# Patient Record
Sex: FEMALE | Race: BLACK OR AFRICAN AMERICAN | NOT HISPANIC OR LATINO | URBAN - METROPOLITAN AREA
[De-identification: names, ages, dates, MRNs, and addresses within clinical notes are randomized per-mention and may not be internally consistent; named-entity substitution may affect disease eponyms.]

---

## 2019-04-28 ENCOUNTER — INPATIENT (INPATIENT)
Facility: HOSPITAL | Age: 69
LOS: 8 days | Discharge: HOME HEALTH | End: 2019-05-07
Attending: INTERNAL MEDICINE | Admitting: INTERNAL MEDICINE
Payer: MEDICARE

## 2019-04-28 VITALS
OXYGEN SATURATION: 100 % | DIASTOLIC BLOOD PRESSURE: 80 MMHG | TEMPERATURE: 98 F | HEIGHT: 65 IN | SYSTOLIC BLOOD PRESSURE: 154 MMHG | WEIGHT: 123.9 LBS | RESPIRATION RATE: 17 BRPM | HEART RATE: 91 BPM

## 2019-04-28 DIAGNOSIS — I10 ESSENTIAL (PRIMARY) HYPERTENSION: ICD-10-CM

## 2019-04-28 DIAGNOSIS — C55 MALIGNANT NEOPLASM OF UTERUS, PART UNSPECIFIED: ICD-10-CM

## 2019-04-28 DIAGNOSIS — N18.3 CHRONIC KIDNEY DISEASE, STAGE 3 (MODERATE): ICD-10-CM

## 2019-04-28 DIAGNOSIS — I82.422 ACUTE EMBOLISM AND THROMBOSIS OF LEFT ILIAC VEIN: ICD-10-CM

## 2019-04-28 DIAGNOSIS — Z29.9 ENCOUNTER FOR PROPHYLACTIC MEASURES, UNSPECIFIED: ICD-10-CM

## 2019-04-28 LAB
ALBUMIN SERPL ELPH-MCNC: 3 G/DL — LOW (ref 3.3–5)
ALP SERPL-CCNC: 120 U/L — SIGNIFICANT CHANGE UP (ref 40–120)
ALT FLD-CCNC: 23 U/L — SIGNIFICANT CHANGE UP (ref 12–78)
ANION GAP SERPL CALC-SCNC: 9 MMOL/L — SIGNIFICANT CHANGE UP (ref 5–17)
APPEARANCE UR: CLEAR — SIGNIFICANT CHANGE UP
APTT BLD: 27 SEC — LOW (ref 28.5–37)
AST SERPL-CCNC: 40 U/L — HIGH (ref 15–37)
BACTERIA # UR AUTO: ABNORMAL
BASOPHILS # BLD AUTO: 0.02 K/UL — SIGNIFICANT CHANGE UP (ref 0–0.2)
BASOPHILS NFR BLD AUTO: 0.2 % — SIGNIFICANT CHANGE UP (ref 0–2)
BILIRUB SERPL-MCNC: 0.2 MG/DL — SIGNIFICANT CHANGE UP (ref 0.2–1.2)
BILIRUB UR-MCNC: NEGATIVE — SIGNIFICANT CHANGE UP
BUN SERPL-MCNC: 42 MG/DL — HIGH (ref 7–23)
CALCIUM SERPL-MCNC: 9.5 MG/DL — SIGNIFICANT CHANGE UP (ref 8.5–10.1)
CHLORIDE SERPL-SCNC: 99 MMOL/L — SIGNIFICANT CHANGE UP (ref 96–108)
CO2 SERPL-SCNC: 25 MMOL/L — SIGNIFICANT CHANGE UP (ref 22–31)
COLOR SPEC: YELLOW — SIGNIFICANT CHANGE UP
CREAT SERPL-MCNC: 2.04 MG/DL — HIGH (ref 0.5–1.3)
DIFF PNL FLD: NEGATIVE — SIGNIFICANT CHANGE UP
EOSINOPHIL # BLD AUTO: 0.01 K/UL — SIGNIFICANT CHANGE UP (ref 0–0.5)
EOSINOPHIL NFR BLD AUTO: 0.1 % — SIGNIFICANT CHANGE UP (ref 0–6)
EPI CELLS # UR: SIGNIFICANT CHANGE UP
GLUCOSE SERPL-MCNC: 150 MG/DL — HIGH (ref 70–99)
GLUCOSE UR QL: NEGATIVE MG/DL — SIGNIFICANT CHANGE UP
HCT VFR BLD CALC: 29.1 % — LOW (ref 34.5–45)
HGB BLD-MCNC: 9.1 G/DL — LOW (ref 11.5–15.5)
HYALINE CASTS # UR AUTO: ABNORMAL /LPF
IMM GRANULOCYTES NFR BLD AUTO: 1 % — SIGNIFICANT CHANGE UP (ref 0–1.5)
INR BLD: 1.14 RATIO — SIGNIFICANT CHANGE UP (ref 0.88–1.16)
KETONES UR-MCNC: NEGATIVE — SIGNIFICANT CHANGE UP
LEUKOCYTE ESTERASE UR-ACNC: NEGATIVE — SIGNIFICANT CHANGE UP
LIDOCAIN IGE QN: 354 U/L — SIGNIFICANT CHANGE UP (ref 73–393)
LYMPHOCYTES # BLD AUTO: 1.11 K/UL — SIGNIFICANT CHANGE UP (ref 1–3.3)
LYMPHOCYTES # BLD AUTO: 9 % — LOW (ref 13–44)
MCHC RBC-ENTMCNC: 23.6 PG — LOW (ref 27–34)
MCHC RBC-ENTMCNC: 31.3 GM/DL — LOW (ref 32–36)
MCV RBC AUTO: 75.6 FL — LOW (ref 80–100)
MONOCYTES # BLD AUTO: 0.94 K/UL — HIGH (ref 0–0.9)
MONOCYTES NFR BLD AUTO: 7.6 % — SIGNIFICANT CHANGE UP (ref 2–14)
NEUTROPHILS # BLD AUTO: 10.13 K/UL — HIGH (ref 1.8–7.4)
NEUTROPHILS NFR BLD AUTO: 82.1 % — HIGH (ref 43–77)
NITRITE UR-MCNC: NEGATIVE — SIGNIFICANT CHANGE UP
NRBC # BLD: 0 /100 WBCS — SIGNIFICANT CHANGE UP (ref 0–0)
PH UR: 5 — SIGNIFICANT CHANGE UP (ref 5–8)
PLATELET # BLD AUTO: 262 K/UL — SIGNIFICANT CHANGE UP (ref 150–400)
POTASSIUM SERPL-MCNC: 5.4 MMOL/L — HIGH (ref 3.5–5.3)
POTASSIUM SERPL-SCNC: 5.4 MMOL/L — HIGH (ref 3.5–5.3)
PROT SERPL-MCNC: 8.1 GM/DL — SIGNIFICANT CHANGE UP (ref 6–8.3)
PROT UR-MCNC: 30 MG/DL
PROTHROM AB SERPL-ACNC: 12.8 SEC — SIGNIFICANT CHANGE UP (ref 10–12.9)
RBC # BLD: 3.85 M/UL — SIGNIFICANT CHANGE UP (ref 3.8–5.2)
RBC # FLD: 13.5 % — SIGNIFICANT CHANGE UP (ref 10.3–14.5)
RBC CASTS # UR COMP ASSIST: SIGNIFICANT CHANGE UP /HPF (ref 0–4)
SODIUM SERPL-SCNC: 133 MMOL/L — LOW (ref 135–145)
SP GR SPEC: 1.01 — SIGNIFICANT CHANGE UP (ref 1.01–1.02)
UROBILINOGEN FLD QL: NEGATIVE MG/DL — SIGNIFICANT CHANGE UP
WBC # BLD: 12.33 K/UL — HIGH (ref 3.8–10.5)
WBC # FLD AUTO: 12.33 K/UL — HIGH (ref 3.8–10.5)
WBC UR QL: SIGNIFICANT CHANGE UP

## 2019-04-28 PROCEDURE — 71045 X-RAY EXAM CHEST 1 VIEW: CPT | Mod: 26

## 2019-04-28 PROCEDURE — 99223 1ST HOSP IP/OBS HIGH 75: CPT | Mod: AI

## 2019-04-28 PROCEDURE — 99285 EMERGENCY DEPT VISIT HI MDM: CPT

## 2019-04-28 PROCEDURE — 93010 ELECTROCARDIOGRAM REPORT: CPT

## 2019-04-28 PROCEDURE — 93970 EXTREMITY STUDY: CPT | Mod: 26

## 2019-04-28 PROCEDURE — 74176 CT ABD & PELVIS W/O CONTRAST: CPT | Mod: 26

## 2019-04-28 RX ORDER — LOSARTAN POTASSIUM 100 MG/1
100 TABLET, FILM COATED ORAL DAILY
Qty: 0 | Refills: 0 | Status: DISCONTINUED | OUTPATIENT
Start: 2019-04-28 | End: 2019-05-07

## 2019-04-28 RX ORDER — HEPARIN SODIUM 5000 [USP'U]/ML
4500 INJECTION INTRAVENOUS; SUBCUTANEOUS ONCE
Qty: 0 | Refills: 0 | Status: COMPLETED | OUTPATIENT
Start: 2019-04-28 | End: 2019-04-28

## 2019-04-28 RX ORDER — HEPARIN SODIUM 5000 [USP'U]/ML
2000 INJECTION INTRAVENOUS; SUBCUTANEOUS EVERY 6 HOURS
Qty: 0 | Refills: 0 | Status: DISCONTINUED | OUTPATIENT
Start: 2019-04-28 | End: 2019-05-05

## 2019-04-28 RX ORDER — SODIUM CHLORIDE 9 MG/ML
3 INJECTION INTRAMUSCULAR; INTRAVENOUS; SUBCUTANEOUS ONCE
Qty: 0 | Refills: 0 | Status: COMPLETED | OUTPATIENT
Start: 2019-04-28 | End: 2019-04-28

## 2019-04-28 RX ORDER — HEPARIN SODIUM 5000 [USP'U]/ML
4500 INJECTION INTRAVENOUS; SUBCUTANEOUS EVERY 6 HOURS
Qty: 0 | Refills: 0 | Status: DISCONTINUED | OUTPATIENT
Start: 2019-04-28 | End: 2019-05-05

## 2019-04-28 RX ORDER — SODIUM CHLORIDE 9 MG/ML
1000 INJECTION INTRAMUSCULAR; INTRAVENOUS; SUBCUTANEOUS ONCE
Qty: 0 | Refills: 0 | Status: COMPLETED | OUTPATIENT
Start: 2019-04-28 | End: 2019-04-28

## 2019-04-28 RX ORDER — HEPARIN SODIUM 5000 [USP'U]/ML
INJECTION INTRAVENOUS; SUBCUTANEOUS
Qty: 25000 | Refills: 0 | Status: DISCONTINUED | OUTPATIENT
Start: 2019-04-28 | End: 2019-05-05

## 2019-04-28 RX ADMIN — HEPARIN SODIUM 4500 UNIT(S): 5000 INJECTION INTRAVENOUS; SUBCUTANEOUS at 20:40

## 2019-04-28 RX ADMIN — HEPARIN SODIUM 1100 UNIT(S)/HR: 5000 INJECTION INTRAVENOUS; SUBCUTANEOUS at 20:43

## 2019-04-28 RX ADMIN — SODIUM CHLORIDE 1000 MILLILITER(S): 9 INJECTION INTRAMUSCULAR; INTRAVENOUS; SUBCUTANEOUS at 20:41

## 2019-04-28 RX ADMIN — SODIUM CHLORIDE 3 MILLILITER(S): 9 INJECTION INTRAMUSCULAR; INTRAVENOUS; SUBCUTANEOUS at 17:42

## 2019-04-28 NOTE — ED PROVIDER NOTE - PHYSICAL EXAMINATION
Gen: Alert, Well appearing. NAD    Head: NC, AT, PERRL, EOMI, normal lids/conjunctiva   ENT: Bilateral TM WNL, patent oropharynx without erythema/exudate, uvula midline  Neck: supple, no tenderness/meningismus  Pulm: Bilateral clear BS, normal resp effort, no wheeze/stridor/retractions  CV: RRR, no M/R/G, +dist pulses   Abd: soft, ++ multipl distended hard lower abd masses, ? fiboids, NT, +BS, no guarding/rebound tenderness  Mskel: +++ edema from groin down to left leg. no erythema, cyanosis, pulses intact.  Skin: no rash   Neuro: AAOx3, no sensory/motor deficits, CN 2-12 intact

## 2019-04-28 NOTE — H&P ADULT - NSHPLABSRESULTS_GEN_ALL_CORE
LABS:                        9.1    12.33 )-----------( 262      ( 2019 18:03 )             29.1     -    133<L>  |  99  |  42<H>  ----------------------------<  150<H>  5.4<H>   |  25  |  2.04<H>    Ca    9.5      2019 18:03    TPro  8.1  /  Alb  3.0<L>  /  TBili  0.2  /  DBili  x   /  AST  40<H>  /  ALT  23  /  AlkPhos  120  -    PT/INR - ( 2019 18:03 )   PT: 12.8 sec;   INR: 1.14 ratio         PTT - ( 2019 18:03 )  PTT:27.0 sec  Urinalysis Basic - ( 2019 20:04 )    Color: Yellow / Appearance: Clear / S.015 / pH: x  Gluc: x / Ketone: Negative  / Bili: Negative / Urobili: Negative mg/dL   Blood: x / Protein: 30 mg/dL / Nitrite: Negative   Leuk Esterase: Negative / RBC: 0-2 /HPF / WBC 3-5   Sq Epi: x / Non Sq Epi: Occasional / Bacteria: Few      CAPILLARY BLOOD GLUCOSE          RADIOLOGY & ADDITIONAL TESTS:    Imaging Personally Reviewed:  [ X] YES  [ ] NO

## 2019-04-28 NOTE — H&P ADULT - NSHPPHYSICALEXAM_GEN_ALL_CORE
Vital Signs Last 24 Hrs  T(C): 36.9 (28 Apr 2019 21:43), Max: 36.9 (28 Apr 2019 21:43)  T(F): 98.5 (28 Apr 2019 21:43), Max: 98.5 (28 Apr 2019 21:43)  HR: 90 (28 Apr 2019 21:43) (90 - 91)  BP: 129/84 (28 Apr 2019 21:43) (129/84 - 154/80)  BP(mean): --  RR: 18 (28 Apr 2019 21:43) (17 - 18)  SpO2: 100% (28 Apr 2019 21:43) (100% - 100%)    PHYSICAL EXAM:    GENERAL: NAD, well-groomed, well-developed  HEAD:  Atraumatic, Normocephalic  EYES: EOMI, PERRLA, conjunctiva and sclera clear  ENMT: No tonsillar erythema, exudates, or enlargement; Moist mucous membranes, No lesions  NECK: Supple, No JVD, Normal thyroid  NERVOUS SYSTEM:  Alert & Oriented X3, Good concentration; Motor Strength 5/5 B/L upper and lower extremities; DTRs 2+ intact and symmetric  CHEST/LUNG: Clear to percussion bilaterally; No rales, rhonchi, wheezing, or rubs  HEART: Regular rate and rhythm; 4/5 murmur, No rubs, or gallops, +S1,S2  ABDOMEN: Soft, +bs, Large firm pelvic and lower abdominal mass  EXTREMITIES:  2+ Peripheral Pulses, No clubbing, cyanosis, or edema on R, LLE 3+edema +cord, +warmth, +mild tenderness  LYMPH: No cervical adenopathy  RECTAL: deferred  BREAST: deferred  : deferred  SKIN: No rashes or lesions    IMPROVE VTE Individual Risk Assessment        RISK                                                          Points  [  x] Previous VTE                                                3  [  ] Thrombophilia                                             2  [  ] Lower limb paralysis                                   2        (unable to hold up >15 seconds)    [  ] Current Cancer                                             2         (within 6 months)  [ x ] Immobilization > 24 hrs                              1  [  ] ICU/CCU stay > 24 hours                            1  [ x ] Age > 60                                                    1  IMPROVE VTE Score ____5_____

## 2019-04-28 NOTE — ED ADULT TRIAGE NOTE - CHIEF COMPLAINT QUOTE
left leg swelling from hip to foot since friday, pain to left leg yesterday. pt able to move toes and foot, foot is warm and pulses present

## 2019-04-28 NOTE — H&P ADULT - HISTORY OF PRESENT ILLNESS
Pt. is a 70 y/o female w/pmhx of HTN,CKD and fibroids comes in w/lle swelling and pain.  Pt states she has been having abnormal vaginal bleeding, had seen gyn and had in office bx that wasnt revealing and [facility]  Pt had noticed swelling of LLE and told anesthesia who wanted LLE US done.  Pt doesnt know of results of US.  pt states she was nervous and anesthesia then wanted to get ct head for which pt was to be admitted, but she didnt want to and left.  she states she noticed more swelling of LLE next day and today she was also getting pain and more swelling which prompted her to come to ed.  pt doesnt re;port any recent travels or trauma.  she denies any fever, chills, sob, cp, palpitations, n/v/d/c no sick contacts.

## 2019-04-28 NOTE — ED ADULT NURSE NOTE - OBJECTIVE STATEMENT
69 y.o. female with a hx of htn complains of left leg swelling that started on friday. patient denies chest pain, shortness of breath, hormone therapy, and long therapy.

## 2019-04-28 NOTE — ED PROVIDER NOTE - OBJECTIVE STATEMENT
68yo female with pmh htn, fibroids presents with left leg edema x 2 days. denies cp, sob, palpitations, abd pain. Of note, pt with had post menopausal bleeding x few months, had biopsy with OBGYN and told it was "mucus".  Pt was supposed to get D&C 3 days ago, but anesthesiologist wanted sono. Sono was neg. Was also pending admission for further workup but pt did not want to stay and did not get D&C.     ROS: No fever/chills. No photophobia/eye pain/changes in vision, No ear pain/sore throat/dysphagia, No chest pain/palpitations. No SOB/cough/stridor. No abdominal pain, N/V/D, no black/bloody bm. No dysuria/frequency/discharge, No headache. No Dizziness.  No rash.  No numbness/tingling/weakness.

## 2019-04-28 NOTE — H&P ADULT - ASSESSMENT
68 y/o female w/pmhx of HTN,CKD and fibroids comes in w/acute extensive lle DVT and likely metastatic uterine ca

## 2019-04-28 NOTE — ED PROVIDER NOTE - CLINICAL SUMMARY MEDICAL DECISION MAKING FREE TEXT BOX
pt with extensive dvt of left iliac. CT performed as lkely source, probably endometrial cancer ans cxr appers to have metastatic lesions. dw dr sarkar for admission and heparin.

## 2019-04-29 LAB
APTT BLD: 89 SEC — HIGH (ref 28.5–37)
APTT BLD: 98.5 SEC — HIGH (ref 27.5–36.3)
CULTURE RESULTS: NO GROWTH — SIGNIFICANT CHANGE UP
HCT VFR BLD CALC: 24.8 % — LOW (ref 34.5–45)
HCT VFR BLD CALC: 28.5 % — LOW (ref 34.5–45)
HCV AB S/CO SERPL IA: 0.11 S/CO — SIGNIFICANT CHANGE UP (ref 0–0.99)
HCV AB SERPL-IMP: SIGNIFICANT CHANGE UP
HGB BLD-MCNC: 7.8 G/DL — LOW (ref 11.5–15.5)
HGB BLD-MCNC: 8.8 G/DL — LOW (ref 11.5–15.5)
MCHC RBC-ENTMCNC: 23.4 PG — LOW (ref 27–34)
MCHC RBC-ENTMCNC: 23.5 PG — LOW (ref 27–34)
MCHC RBC-ENTMCNC: 30.9 GM/DL — LOW (ref 32–36)
MCHC RBC-ENTMCNC: 31.5 GM/DL — LOW (ref 32–36)
MCV RBC AUTO: 74.7 FL — LOW (ref 80–100)
MCV RBC AUTO: 75.8 FL — LOW (ref 80–100)
NRBC # BLD: 0 /100 WBCS — SIGNIFICANT CHANGE UP (ref 0–0)
NRBC # BLD: 0 /100 WBCS — SIGNIFICANT CHANGE UP (ref 0–0)
PLATELET # BLD AUTO: 208 K/UL — SIGNIFICANT CHANGE UP (ref 150–400)
PLATELET # BLD AUTO: 282 K/UL — SIGNIFICANT CHANGE UP (ref 150–400)
RBC # BLD: 3.32 M/UL — LOW (ref 3.8–5.2)
RBC # BLD: 3.76 M/UL — LOW (ref 3.8–5.2)
RBC # FLD: 13.5 % — SIGNIFICANT CHANGE UP (ref 10.3–14.5)
RBC # FLD: 13.6 % — SIGNIFICANT CHANGE UP (ref 10.3–14.5)
SPECIMEN SOURCE: SIGNIFICANT CHANGE UP
WBC # BLD: 11.44 K/UL — HIGH (ref 3.8–10.5)
WBC # BLD: 14.73 K/UL — HIGH (ref 3.8–10.5)
WBC # FLD AUTO: 11.44 K/UL — HIGH (ref 3.8–10.5)
WBC # FLD AUTO: 14.73 K/UL — HIGH (ref 3.8–10.5)

## 2019-04-29 PROCEDURE — 99233 SBSQ HOSP IP/OBS HIGH 50: CPT

## 2019-04-29 RX ORDER — INFLUENZA VIRUS VACCINE 15; 15; 15; 15 UG/.5ML; UG/.5ML; UG/.5ML; UG/.5ML
0.5 SUSPENSION INTRAMUSCULAR ONCE
Qty: 0 | Refills: 0 | Status: COMPLETED | OUTPATIENT
Start: 2019-04-29 | End: 2019-04-29

## 2019-04-29 RX ADMIN — HEPARIN SODIUM 1100 UNIT(S)/HR: 5000 INJECTION INTRAVENOUS; SUBCUTANEOUS at 03:10

## 2019-04-29 RX ADMIN — LOSARTAN POTASSIUM 100 MILLIGRAM(S): 100 TABLET, FILM COATED ORAL at 06:04

## 2019-04-29 RX ADMIN — HEPARIN SODIUM 1100 UNIT(S)/HR: 5000 INJECTION INTRAVENOUS; SUBCUTANEOUS at 11:22

## 2019-04-29 NOTE — CONSULT NOTE ADULT - PROBLEM SELECTOR RECOMMENDATION 9
- Patient needs Gyn-Onc Evaluation, suggest Gyn Evaluation, Patient may have Sarcoma, or endometrial carcinoma   - Ca 125  - Anticoagulation with heparin for now  - Check Iron Studies, watch Blood counts

## 2019-04-29 NOTE — PROGRESS NOTE ADULT - SUBJECTIVE AND OBJECTIVE BOX
HPI:  Pt. is a 68 y/o female w/pmhx of HTN,CKD and fibroids comes in w/lle swelling and pain.  Pt states she has been having abnormal vaginal bleeding, had seen gyn and had in office bx that wasnt revealing and was scheduled for D/C at [facility] on friday.  Pt had noticed swelling of LLE and told anesthesia who wanted LLE US done.  Pt doesnt know of results of US.  pt states she was nervous and anesthesia then wanted to get ct head for which pt was to be admitted, but she didnt want to and left.  she states she noticed more swelling of LLE next day and today she was also getting pain and more swelling which prompted her to come to ed.  pt doesnt re;port any recent travels or trauma.  she denies any fever, chills, sob, cp, palpitations, n/v/d/c no sick contacts. (2019 23:13)    Patient is a 69y old  Female who presents with a chief complaint of LLE  Swelling (2019 10:19)      INTERVAL HPI/OVERNIGHT EVENTS: nO acute events overnigfht on heparin drip     MEDICATIONS  (STANDING):  heparin  Infusion.  Unit(s)/Hr (11 mL/Hr) IV Continuous <Continuous>  influenza   Vaccine 0.5 milliLiter(s) IntraMuscular once  losartan 100 milliGRAM(s) Oral daily    MEDICATIONS  (PRN):  heparin  Injectable 4500 Unit(s) IV Push every 6 hours PRN For aPTT less than 40  heparin  Injectable 2000 Unit(s) IV Push every 6 hours PRN For aPTT between 40 - 57      Allergies    No Known Allergies    Intolerances        REVIEW OF SYSTEMS:  CONSTITUTIONAL: No fever, weight loss, or fatigue  EYES: No eye pain, visual disturbances, or discharge  ENMT:  No difficulty hearing, tinnitus, vertigo; No sinus or throat pain  NECK: No pain or stiffness  BREASTS: No pain, masses, or nipple discharge  RESPIRATORY: No cough, wheezing, chills or hemoptysis; No shortness of breath  CARDIOVASCULAR: No chest pain, palpitations, dizziness, or leg swelling  GASTROINTESTINAL: No abdominal or epigastric pain. No nausea, vomiting, or hematemesis; No diarrhea or constipation. No melena or hematochezia.  GENITOURINARY: No dysuria, frequency, hematuria, or incontinence  NEUROLOGICAL: No headaches, memory loss, loss of strength, numbness, or tremors  SKIN: No itching, burning, rashes, or lesions   LYMPH NODES: No enlarged glands  ENDOCRINE: No heat or cold intolerance; No hair loss  MUSCULOSKELETAL: left leg swelling PSYCHIATRIC: No depression, anxiety, mood swings, or difficulty sleeping  HEME/LYMPH: No easy bruising, or bleeding gums  ALLERGY AND IMMUNOLOGIC: No hives or eczema    Vital Signs Last 24 Hrs  T(C): 37.2 (2019 12:00), Max: 37.7 (2019 00:45)  T(F): 99 (2019 12:00), Max: 99.9 (2019 05:37)  HR: 102 (2019 12:00) (81 - 102)  BP: 126/76 (2019 12:00) (125/72 - 154/80)  BP(mean): --  RR: 16 (2019 12:00) (16 - 18)  SpO2: 97% (2019 12:00) (97% - 100%)    PHYSICAL EXAM:  GENERAL: NAD, well-groomed, well-developed  HEAD:  Atraumatic, Normocephalic  EYES: EOMI, PERRLA, conjunctiva and sclera clear  ENMT: No tonsillar erythema, exudates, or enlargement; Moist mucous membranes, Good dentition, No lesions  NECK: Supple, No JVD, Normal thyroid  NERVOUS SYSTEM:  Alert & Oriented X3, Good concentration; Motor Strength 5/5 B/L upper and lower extremities; DTRs 2+ intact and symmetric Off affect   CHEST/LUNG: Clear to percussion bilaterally; No rales, rhonchi, wheezing, or rubs  HEART: Regular rate and rhythm; No murmurs, rubs, or gallops  ABDOMEN: Soft, Nontender, Nondistended; Bowel sounds present  EXTREMITIES:  left lower leg swelling LYMPH: No lymphadenopathy noted  SKIN: No rashes or lesions    LABS:                        8.8    14.73 )-----------( 282      ( 2019 08:44 )             28.5     04-28    133<L>  |  99  |  42<H>  ----------------------------<  150<H>  5.4<H>   |  25  |  2.04<H>    Ca    9.5      2019 18:03    TPro  8.1  /  Alb  3.0<L>  /  TBili  0.2  /  DBili  x   /  AST  40<H>  /  ALT  23  /  AlkPhos  120  -    PT/INR - ( 2019 18:03 )   PT: 12.8 sec;   INR: 1.14 ratio         PTT - ( 2019 10:22 )  PTT:98.5 sec  Urinalysis Basic - ( 2019 20:04 )    Color: Yellow / Appearance: Clear / S.015 / pH: x  Gluc: x / Ketone: Negative  / Bili: Negative / Urobili: Negative mg/dL   Blood: x / Protein: 30 mg/dL / Nitrite: Negative   Leuk Esterase: Negative / RBC: 0-2 /HPF / WBC 3-5   Sq Epi: x / Non Sq Epi: Occasional / Bacteria: Few        RADIOLOGY & ADDITIONAL TESTS:    Imaging Personally Reviewed:  [X ] YES  [ ] NO    Consultant(s) Notes Reviewed:  [X ] YES  [ ] NO    Care Discussed with Consultants/Other Providers [X ] YES  [ ] NO

## 2019-04-29 NOTE — PROGRESS NOTE ADULT - ASSESSMENT
68 y/o female w/pmhx of HTN,CKD and fibroids comes in w/acute extensive lle DVT and likely metastatic uterine ca    Tumor markers in am   MR pelvis no contrast secondary to creatinine  IVF       NEED to contact PMD and Oncologist

## 2019-04-30 DIAGNOSIS — K57.90 DIVERTICULOSIS OF INTESTINE, PART UNSPECIFIED, WITHOUT PERFORATION OR ABSCESS WITHOUT BLEEDING: ICD-10-CM

## 2019-04-30 DIAGNOSIS — R10.32 LEFT LOWER QUADRANT PAIN: ICD-10-CM

## 2019-04-30 LAB
ALBUMIN SERPL ELPH-MCNC: 2.2 G/DL — LOW (ref 3.3–5)
ALP SERPL-CCNC: 133 U/L — HIGH (ref 40–120)
ALT FLD-CCNC: 28 U/L — SIGNIFICANT CHANGE UP (ref 12–78)
ANION GAP SERPL CALC-SCNC: 8 MMOL/L — SIGNIFICANT CHANGE UP (ref 5–17)
APTT BLD: 105.5 SEC — HIGH (ref 27.5–36.3)
APTT BLD: 67.6 SEC — HIGH (ref 28.5–37)
APTT BLD: 72.4 SEC — HIGH (ref 28.5–37)
AST SERPL-CCNC: 51 U/L — HIGH (ref 15–37)
BILIRUB SERPL-MCNC: 0.4 MG/DL — SIGNIFICANT CHANGE UP (ref 0.2–1.2)
BUN SERPL-MCNC: 35 MG/DL — HIGH (ref 7–23)
CALCIUM SERPL-MCNC: 9.3 MG/DL — SIGNIFICANT CHANGE UP (ref 8.5–10.1)
CANCER AG125 SERPL-ACNC: 60 U/ML — HIGH
CHLORIDE SERPL-SCNC: 108 MMOL/L — SIGNIFICANT CHANGE UP (ref 96–108)
CO2 SERPL-SCNC: 25 MMOL/L — SIGNIFICANT CHANGE UP (ref 22–31)
CREAT SERPL-MCNC: 1.85 MG/DL — HIGH (ref 0.5–1.3)
FERRITIN SERPL-MCNC: 313 NG/ML — HIGH (ref 15–150)
FOLATE SERPL-MCNC: >20 NG/ML — SIGNIFICANT CHANGE UP
GLUCOSE SERPL-MCNC: 119 MG/DL — HIGH (ref 70–99)
HCT VFR BLD CALC: 24.6 % — LOW (ref 34.5–45)
HGB BLD-MCNC: 7.6 G/DL — LOW (ref 11.5–15.5)
IRON SATN MFR SERPL: 15 UG/DL — LOW (ref 30–160)
IRON SATN MFR SERPL: 8 % — LOW (ref 14–50)
LACTATE SERPL-SCNC: 1.3 MMOL/L — SIGNIFICANT CHANGE UP (ref 0.7–2)
MCHC RBC-ENTMCNC: 23.2 PG — LOW (ref 27–34)
MCHC RBC-ENTMCNC: 30.9 GM/DL — LOW (ref 32–36)
MCV RBC AUTO: 75.2 FL — LOW (ref 80–100)
NRBC # BLD: 0 /100 WBCS — SIGNIFICANT CHANGE UP (ref 0–0)
PLATELET # BLD AUTO: 269 K/UL — SIGNIFICANT CHANGE UP (ref 150–400)
POTASSIUM SERPL-MCNC: 4.8 MMOL/L — SIGNIFICANT CHANGE UP (ref 3.5–5.3)
POTASSIUM SERPL-SCNC: 4.8 MMOL/L — SIGNIFICANT CHANGE UP (ref 3.5–5.3)
PROT SERPL-MCNC: 6.5 GM/DL — SIGNIFICANT CHANGE UP (ref 6–8.3)
RBC # BLD: 3.27 M/UL — LOW (ref 3.8–5.2)
RBC # BLD: 3.27 M/UL — LOW (ref 3.8–5.2)
RBC # FLD: 13.8 % — SIGNIFICANT CHANGE UP (ref 10.3–14.5)
RETICS #: 47.4 K/UL — SIGNIFICANT CHANGE UP (ref 25–125)
RETICS/RBC NFR: 1.5 % — SIGNIFICANT CHANGE UP (ref 0.5–2.5)
SODIUM SERPL-SCNC: 141 MMOL/L — SIGNIFICANT CHANGE UP (ref 135–145)
TIBC SERPL-MCNC: 188 UG/DL — LOW (ref 220–430)
UIBC SERPL-MCNC: 173 UG/DL — SIGNIFICANT CHANGE UP (ref 110–370)
VIT B12 SERPL-MCNC: 1110 PG/ML — SIGNIFICANT CHANGE UP (ref 232–1245)
WBC # BLD: 13.88 K/UL — HIGH (ref 3.8–10.5)
WBC # FLD AUTO: 13.88 K/UL — HIGH (ref 3.8–10.5)

## 2019-04-30 PROCEDURE — 76856 US EXAM PELVIC COMPLETE: CPT | Mod: 26

## 2019-04-30 PROCEDURE — 72195 MRI PELVIS W/O DYE: CPT | Mod: 26

## 2019-04-30 PROCEDURE — 99233 SBSQ HOSP IP/OBS HIGH 50: CPT

## 2019-04-30 RX ORDER — ACETAMINOPHEN 500 MG
650 TABLET ORAL EVERY 6 HOURS
Qty: 0 | Refills: 0 | Status: DISCONTINUED | OUTPATIENT
Start: 2019-04-30 | End: 2019-05-07

## 2019-04-30 RX ADMIN — HEPARIN SODIUM 1000 UNIT(S)/HR: 5000 INJECTION INTRAVENOUS; SUBCUTANEOUS at 06:19

## 2019-04-30 RX ADMIN — HEPARIN SODIUM 1000 UNIT(S)/HR: 5000 INJECTION INTRAVENOUS; SUBCUTANEOUS at 15:53

## 2019-04-30 RX ADMIN — Medication 650 MILLIGRAM(S): at 02:25

## 2019-04-30 RX ADMIN — HEPARIN SODIUM 1000 UNIT(S)/HR: 5000 INJECTION INTRAVENOUS; SUBCUTANEOUS at 22:36

## 2019-04-30 RX ADMIN — Medication 650 MILLIGRAM(S): at 01:55

## 2019-04-30 NOTE — PROGRESS NOTE ADULT - ASSESSMENT
PERIMENOPAUSAL FEMALE LLQ PAIN WITH  LEFT COMPLEX MASS QUESTIONABLE BLOOD FLOW.  PERIOD TWO WEEKS AGO  IRREGULAR,   WILL ADMIT PATIENT FOR  OBSERVATION , HYDRATION AND ANTIBIOTICS PE  + BS SOFTLY DISTENDED WITH A HARD TENDER MASS EQUIVALENT  WITH A  24 WEEK SIZE UTERUS THE MASS IS TENDER TO PALPATION AND IS MOST CONSISTENT WITH A DEGENERATING FIBROID HOWEVER MALIGANCY CAN NOT BE RULE OUT UNTIL A SPECIMEN IS OBTAINED   VAGINA SCANT BLOOD WITH NO LESION ATROPHIC  NO VAGINAL MASS  CERVIX IS ELEVATED TO AN APES NOT REACHABLE ON EXAM THE MASS OCCUPIES MOST OF THE PELVIS

## 2019-04-30 NOTE — PROGRESS NOTE ADULT - PROBLEM SELECTOR PLAN 1
ADMIT FOR HYDRATION AND ANTIBIOTICS  PERCOCET AND MOTRIN FOR PAIN   CLEAR LIQUIDS AND NPO AFTER MIDNIGHT   REEVALUATE IN AM ELEVATION WARM COMPRESS ANTICOAGULATION

## 2019-04-30 NOTE — PROGRESS NOTE ADULT - SUBJECTIVE AND OBJECTIVE BOX
HPI:  Pt. is a 70 y/o female w/pmhx of HTN,CKD and fibroids comes in w/lle swelling and pain.  Pt states she has been having abnormal vaginal bleeding, had seen gyn and had in office bx that wasnt revealing and was scheduled for D/C at [facility] on friday.  Pt had noticed swelling of LLE and told anesthesia who wanted LLE US done.  Pt doesnt know of results of US.  pt states she was nervous and anesthesia then wanted to get ct head for which pt was to be admitted, but she didnt want to and left.  she states she noticed more swelling of LLE next day and today she was also getting pain and more swelling which prompted her to come to ed.  pt doesnt re;port any recent travels or trauma.  she denies any fever, chills, sob, cp, palpitations, n/v/d/c no sick contacts. (2019 23:13)      Patient is a 69y old  Female who presents with a chief complaint of LLE  Swelling (2019 15:52)      INTERVAL HPI/OVERNIGHT EVENTS: no acute events overnight reports some bleeding     MEDICATIONS  (STANDING):  heparin  Infusion.  Unit(s)/Hr (11 mL/Hr) IV Continuous <Continuous>  influenza   Vaccine 0.5 milliLiter(s) IntraMuscular once  losartan 100 milliGRAM(s) Oral daily    MEDICATIONS  (PRN):  acetaminophen   Tablet .. 650 milliGRAM(s) Oral every 6 hours PRN Temp greater or equal to 38C (100.4F)  heparin  Injectable 4500 Unit(s) IV Push every 6 hours PRN For aPTT less than 40  heparin  Injectable 2000 Unit(s) IV Push every 6 hours PRN For aPTT between 40 - 57      Allergies    No Known Allergies    Intolerances        REVIEW OF SYSTEMS:  CONSTITUTIONAL: No fever, weight loss, or fatigue  EYES: No eye pain, visual disturbances, or discharge  ENMT:  No difficulty hearing, tinnitus, vertigo; No sinus or throat pain  NECK: No pain or stiffness  BREASTS: No pain, masses, or nipple discharge  RESPIRATORY: No cough, wheezing, chills or hemoptysis; No shortness of breath  CARDIOVASCULAR: No chest pain, palpitations, dizziness, or leg swelling  GASTROINTESTINAL: No abdominal or epigastric pain. No nausea, vomiting, or hematemesis; No diarrhea or constipation. No melena or hematochezia.  GENITOURINARY: No dysuria, frequency, hematuria, or incontinence  NEUROLOGICAL: No headaches, memory loss, loss of strength, numbness, or tremors  SKIN: No itching, burning, rashes, or lesions   LYMPH NODES: No enlarged glands  ENDOCRINE: No heat or cold intolerance; No hair loss  MUSCULOSKELETAL: left leg swelling improving   PSYCHIATRIC: No depression, anxiety, mood swings, or difficulty sleeping  HEME/LYMPH: No easy bruising, or bleeding gums  ALLERGY AND IMMUNOLOGIC: No hives or eczema    Vital Signs Last 24 Hrs  T(C): 37.1 (2019 11:45), Max: 38.3 (2019 00:12)  T(F): 98.7 (2019 11:45), Max: 101 (2019 00:12)  HR: 88 (2019 11:45) (77 - 88)  BP: 124/74 (2019 11:45) (102/63 - 124/74)  BP(mean): --  RR: 17 (2019 11:45) (16 - 17)  SpO2: 98% (2019 11:45) (97% - 100%)    PHYSICAL EXAM:  GENERAL: NAD, well-groomed, well-developed  HEAD:  Atraumatic, Normocephalic  EYES: EOMI, PERRLA, conjunctiva and sclera clear  ENMT: No tonsillar erythema, exudates, or enlargement; Moist mucous membranes, Good dentition, No lesions  NECK: Supple, No JVD, Normal thyroid  NERVOUS SYSTEM:  Alert & Oriented X3, Good concentration; Motor Strength 5/5 B/L upper and lower extremities; DTRs 2+ intact and symmetric  CHEST/LUNG: Clear to percussion bilaterally; No rales, rhonchi, wheezing, or rubs  HEART: Regular rate and rhythm; No murmurs, rubs, or gallops  ABDOMEN: Soft, Nontender, Nondistended; Bowel sounds present  EXTREMITIES:  left leg swelling LYMPH: No lymphadenopathy noted  SKIN: No rashes or lesions    LABS:                        7.6    13.88 )-----------( 269      ( 2019 06:19 )             24.6     04-30    141  |  108  |  35<H>  ----------------------------<  119<H>  4.8   |  25  |  1.85<H>    Ca    9.3      2019 06:19    TPro  6.5  /  Alb  2.2<L>  /  TBili  0.4  /  DBili  x   /  AST  51<H>  /  ALT  28  /  AlkPhos  133<H>  04-    PTT - ( 2019 15:31 )  PTT:72.4 sec  Urinalysis Basic - ( 2019 20:04 )    Color: Yellow / Appearance: Clear / S.015 / pH: x  Gluc: x / Ketone: Negative  / Bili: Negative / Urobili: Negative mg/dL   Blood: x / Protein: 30 mg/dL / Nitrite: Negative   Leuk Esterase: Negative / RBC: 0-2 /HPF / WBC 3-5   Sq Epi: x / Non Sq Epi: Occasional / Bacteria: Few      CAPILLARY BLOOD GLUCOSE          RADIOLOGY & ADDITIONAL TESTS:  < from: MR Pelvis No Cont (19 @ 16:37) >  IMPRESSION:    The study islimited due to motion and lack of intravenous contrast.    Markedly enlarged heterogeneous uterus with multiple masses which may   represent fibroids versus leiomyosarcoma.     Additional findings as above.      < end of copied text >  < from: US Pelvis Complete (19 @ 10:40) >  IMPRESSION:    Markedly enlarged, heterogeneous uterus concerning for uterine neoplasm.   Endometrium is distorted and not well visualized.    There is debris versus nodularity along the right urinary bladder wall.   Consider further evaluation with contrast-enhanced CT or cystoscopy to   evaluate for metastatic disease.      < end of copied text >    Imaging Personally Reviewed:  [ X] YES  [ ] NO    Consultant(s) Notes Reviewed:  [X ] YES  [ ] NO    Care Discussed with Consultants/Other Providers [ X] YES  [ ] NO

## 2019-04-30 NOTE — PROGRESS NOTE ADULT - SUBJECTIVE AND OBJECTIVE BOX
54 YEAR OLD BLACK FEMALE  PRESENTS WITH SUDDEN ONSET OF LLQ PAIN SEVERE ENOUGH TO CALL EMS  WHICH AWAKE HER FROM SLEEP  ACCOMPANIED BY NAUSEA AND VOMITING   +PMH SIMILAR EPISODE OF LLQ CLAUDE SEVERAL YEARS AGO WHICH RESOLVED OVER THE COURSE OF THREE DAYS WITH OUT INTERVENTION   +PSH CATARACT SURGERY RIGHT EYE AND 2 TOP/D&C  +POBH  24 YEARS AGO UNCOMPLICATED   NKDA NO TOBACCO DRUGS SOCIAL ETOH   INFREQUENT INTERCOURSE HAS BEEN WITH PAIN   LMP IRREGULAR APPROXIMATELY 15 DAYS AGO     +BS SOFTLY DISTENDED WITH BOWEL SOUNDS  VULVAR WITH OUT LESIONS SCANT BLOOD  NO MASS AND OR TENDERNESS ON BIMANUAL EXAM  OS FREELY MOBILE WITH TO ADNEXAL MASS OF TENDERNESS 54 YEAR OLD WEST  FEMALE PRESENTS WITH LLE SWELLING KIMI THE ER. THE PATIENT WAS ADMITTED AND ONDE ADMITTED UNDER WENT SEVERAL IMAGING PROCEDURES WHICH REVEAL A LARGE ABDOMEN MASS WHICH IS EVIDENT ON PE  THE PATIENT ADMITS TO HAVE BEEN EXPERIENCING POSTMENOPAUSAL BLEEDING SINCE 02/19

## 2019-04-30 NOTE — PROGRESS NOTE ADULT - ASSESSMENT
70 y/o female w/pmhx of HTN,CKD and fibroids comes in w/acute extensive lle DVT and likely metastatic uterine ca       will have GYN consult in AM     messages left for PMD Dr. Nilson Parisi [phone]    Possible outside OBGYN [phone]

## 2019-04-30 NOTE — PROGRESS NOTE ADULT - PROBLEM SELECTOR PLAN 2
BLAND DIET  CLEAR LIQUIDS NPO AFTER MIDNIGHT  STOOL SOFTENER MORE THAN LIKELY RELATED TO A COMBINATION OF DEGENERATING FIBROID POSSIBLE POLYP AND ANTICOAGULATION  ONE TREATMENT FOR LLE IS COMPLETE WOULD CHOICE TO SAMPLE ENDOMETRIUM AND MORE THAN LIKELY PREFORM PRATEEK/BSO

## 2019-05-01 DIAGNOSIS — N93.8 OTHER SPECIFIED ABNORMAL UTERINE AND VAGINAL BLEEDING: ICD-10-CM

## 2019-05-01 DIAGNOSIS — I82.492 ACUTE EMBOLISM AND THROMBOSIS OF OTHER SPECIFIED DEEP VEIN OF LEFT LOWER EXTREMITY: ICD-10-CM

## 2019-05-01 LAB
ALBUMIN SERPL ELPH-MCNC: 2.3 G/DL — LOW (ref 3.3–5)
ALP SERPL-CCNC: 171 U/L — HIGH (ref 40–120)
ALT FLD-CCNC: 36 U/L — SIGNIFICANT CHANGE UP (ref 12–78)
ANION GAP SERPL CALC-SCNC: 9 MMOL/L — SIGNIFICANT CHANGE UP (ref 5–17)
APTT BLD: 78 SEC — HIGH (ref 28.5–37)
AST SERPL-CCNC: 54 U/L — HIGH (ref 15–37)
BILIRUB SERPL-MCNC: 0.4 MG/DL — SIGNIFICANT CHANGE UP (ref 0.2–1.2)
BUN SERPL-MCNC: 33 MG/DL — HIGH (ref 7–23)
CALCIUM SERPL-MCNC: 9.5 MG/DL — SIGNIFICANT CHANGE UP (ref 8.5–10.1)
CHLORIDE SERPL-SCNC: 104 MMOL/L — SIGNIFICANT CHANGE UP (ref 96–108)
CO2 SERPL-SCNC: 24 MMOL/L — SIGNIFICANT CHANGE UP (ref 22–31)
CREAT SERPL-MCNC: 1.95 MG/DL — HIGH (ref 0.5–1.3)
GLUCOSE SERPL-MCNC: 123 MG/DL — HIGH (ref 70–99)
HCT VFR BLD CALC: 25.4 % — LOW (ref 34.5–45)
HGB BLD-MCNC: 7.9 G/DL — LOW (ref 11.5–15.5)
MAGNESIUM SERPL-MCNC: 2.4 MG/DL — SIGNIFICANT CHANGE UP (ref 1.6–2.6)
MCHC RBC-ENTMCNC: 23.4 PG — LOW (ref 27–34)
MCHC RBC-ENTMCNC: 31.1 GM/DL — LOW (ref 32–36)
MCV RBC AUTO: 75.4 FL — LOW (ref 80–100)
NRBC # BLD: 0 /100 WBCS — SIGNIFICANT CHANGE UP (ref 0–0)
PHOSPHATE SERPL-MCNC: 3.4 MG/DL — SIGNIFICANT CHANGE UP (ref 2.5–4.5)
PLATELET # BLD AUTO: 326 K/UL — SIGNIFICANT CHANGE UP (ref 150–400)
POTASSIUM SERPL-MCNC: 4.7 MMOL/L — SIGNIFICANT CHANGE UP (ref 3.5–5.3)
POTASSIUM SERPL-SCNC: 4.7 MMOL/L — SIGNIFICANT CHANGE UP (ref 3.5–5.3)
PROT SERPL-MCNC: 7.1 GM/DL — SIGNIFICANT CHANGE UP (ref 6–8.3)
RBC # BLD: 3.37 M/UL — LOW (ref 3.8–5.2)
RBC # FLD: 13.9 % — SIGNIFICANT CHANGE UP (ref 10.3–14.5)
SODIUM SERPL-SCNC: 137 MMOL/L — SIGNIFICANT CHANGE UP (ref 135–145)
WBC # BLD: 12.93 K/UL — HIGH (ref 3.8–10.5)
WBC # FLD AUTO: 12.93 K/UL — HIGH (ref 3.8–10.5)

## 2019-05-01 PROCEDURE — 99233 SBSQ HOSP IP/OBS HIGH 50: CPT

## 2019-05-01 RX ADMIN — Medication 650 MILLIGRAM(S): at 16:18

## 2019-05-01 RX ADMIN — HEPARIN SODIUM 1000 UNIT(S)/HR: 5000 INJECTION INTRAVENOUS; SUBCUTANEOUS at 07:00

## 2019-05-01 RX ADMIN — Medication 650 MILLIGRAM(S): at 19:00

## 2019-05-01 NOTE — PROGRESS NOTE ADULT - SUBJECTIVE AND OBJECTIVE BOX
Lying in bed, feels well    Vital Signs Last 24 Hrs  T(C): 37.4 (01 May 2019 05:00), Max: 37.7 (30 Apr 2019 23:12)  T(F): 99.3 (01 May 2019 05:00), Max: 99.8 (30 Apr 2019 23:12)  HR: 81 (01 May 2019 05:00) (81 - 89)  BP: 104/65 (01 May 2019 05:00) (104/65 - 124/74)  BP(mean): --  RR: 17 (01 May 2019 05:00) (17 - 17)  SpO2: 100% (01 May 2019 05:00) (98% - 100%)    PHYSICAL EXAM:    general - AAO x 3  HEENT - No Icterus  CVS - RRR  RS - AE B/L  Abd - soft, NT  Ext - Pulses +        LABS:                        7.9    12.93 )-----------( 326      ( 01 May 2019 06:42 )             25.4     05-01    137  |  104  |  33<H>  ----------------------------<  123<H>  4.7   |  24  |  1.95<H>    Ca    9.5      01 May 2019 06:42  Phos  3.4     05-01  Mg     2.4     05-01    TPro  7.1  /  Alb  2.3<L>  /  TBili  0.4  /  DBili  x   /  AST  54<H>  /  ALT  36  /  AlkPhos  171<H>  05-01    PTT - ( 01 May 2019 06:42 )  PTT:78.0 sec      Culture - Blood (collected 30 Apr 2019 09:17)  Source: .Blood  Preliminary Report (01 May 2019 10:00):    No growth to date.    Culture - Blood (collected 30 Apr 2019 09:17)  Source: .Blood  Preliminary Report (01 May 2019 10:00):    No growth to date.    Culture - Urine (collected 29 Apr 2019 01:19)  Source: .Urine  Final Report (29 Apr 2019 21:22):    No growth        RADIOLOGY & ADDITIONAL STUDIES:

## 2019-05-01 NOTE — PROGRESS NOTE ADULT - SUBJECTIVE AND OBJECTIVE BOX
Patient is a 69y old  Female who presents with a chief complaint of LLE  Swelling (01 May 2019 10:20)      INTERVAL HPI/OVERNIGHT EVENTS: no acute events fever     MEDICATIONS  (STANDING):  heparin  Infusion.  Unit(s)/Hr (11 mL/Hr) IV Continuous <Continuous>  influenza   Vaccine 0.5 milliLiter(s) IntraMuscular once  losartan 100 milliGRAM(s) Oral daily    MEDICATIONS  (PRN):  acetaminophen   Tablet .. 650 milliGRAM(s) Oral every 6 hours PRN Temp greater or equal to 38C (100.4F)  heparin  Injectable 4500 Unit(s) IV Push every 6 hours PRN For aPTT less than 40  heparin  Injectable 2000 Unit(s) IV Push every 6 hours PRN For aPTT between 40 - 57      Allergies    No Known Allergies    Intolerances        REVIEW OF SYSTEMS:  CONSTITUTIONAL: fever   EYES: No eye pain, visual disturbances, or discharge  ENMT:  No difficulty hearing, tinnitus, vertigo; No sinus or throat pain  NECK: No pain or stiffness  BREASTS: No pain, masses, or nipple discharge  RESPIRATORY: No cough, wheezing, chills or hemoptysis; No shortness of breath  CARDIOVASCULAR: No chest pain, palpitations, dizziness, or leg swelling  GASTROINTESTINAL: No abdominal or epigastric pain. No nausea, vomiting, or hematemesis; No diarrhea or constipation. No melena or hematochezia.  GENITOURINARY: No dysuria, frequency, hematuria, or incontinence  NEUROLOGICAL: No headaches, memory loss, loss of strength, numbness, or tremors  SKIN: No itching, burning, rashes, or lesions   LYMPH NODES: No enlarged glands  ENDOCRINE: No heat or cold intolerance; No hair loss  MUSCULOSKELETAL: No joint pain or swelling; No muscle, back, or extremity pain  PSYCHIATRIC: No depression, anxiety, mood swings, or difficulty sleeping  HEME/LYMPH: No easy bruising, or bleeding gums  ALLERGY AND IMMUNOLOGIC: No hives or eczema    Vital Signs Last 24 Hrs  T(C): 37.2 (01 May 2019 18:59), Max: 38.3 (01 May 2019 16:17)  T(F): 99 (01 May 2019 18:59), Max: 101 (01 May 2019 16:17)  HR: 89 (01 May 2019 17:03) (81 - 89)  BP: 113/66 (01 May 2019 17:03) (104/65 - 120/70)  BP(mean): --  RR: 17 (01 May 2019 17:03) (17 - 17)  SpO2: 99% (01 May 2019 17:03) (98% - 100%)    PHYSICAL EXAM:  GENERAL: NAD, well-groomed, well-developed  HEAD:  Atraumatic, Normocephalic  EYES: EOMI, PERRLA, conjunctiva and sclera clear  ENMT: No tonsillar erythema, exudates, or enlargement; Moist mucous membranes, Good dentition, No lesions  NECK: Supple, No JVD, Normal thyroid  NERVOUS SYSTEM:  Alert & Oriented X3, Good concentration; Motor Strength 5/5 B/L upper and lower extremities; DTRs 2+ intact and symmetric  CHEST/LUNG: Clear to percussion bilaterally; No rales, rhonchi, wheezing, or rubs  HEART: Regular rate and rhythm; No murmurs, rubs, or gallops  ABDOMEN: Soft, Nontender, Nondistended; Bowel sounds present  EXTREMITIES: left leg swelling LYMPH: No lymphadenopathy noted  SKIN: No rashes or lesions    LABS:                        7.9    12.93 )-----------( 326      ( 01 May 2019 06:42 )             25.4     05-01    137  |  104  |  33<H>  ----------------------------<  123<H>  4.7   |  24  |  1.95<H>    Ca    9.5      01 May 2019 06:42  Phos  3.4     05-01  Mg     2.4     05-01    TPro  7.1  /  Alb  2.3<L>  /  TBili  0.4  /  DBili  x   /  AST  54<H>  /  ALT  36  /  AlkPhos  171<H>  05-01    PTT - ( 01 May 2019 06:42 )  PTT:78.0 sec    CAPILLARY BLOOD GLUCOSE  < from: MR Pelvis No Cont (04.30.19 @ 16:37) >    The study islimited due to motion and lack of intravenous contrast.    Markedly enlarged heterogeneous uterus with multiple masses which may   represent fibroids versus leiomyosarcoma.     Additional findings as above.    < end of copied text >      Imaging Personally Reviewed:  [ ] YES  [ ] NO    Consultant(s) Notes Reviewed:  [ ] YES  [ ] NO    Care Discussed with Consultants/Other Providers [ ] YES  [ ] NO

## 2019-05-01 NOTE — PROGRESS NOTE ADULT - PROBLEM SELECTOR PLAN 1
- continue Anticoagulation  - Expansion of DVT on MRI - consider Vascular Evaluation for possible Filter, DVT expanding possibly secondary to compression from uterine mass  - Suggest Transfer/ Patient be seen at [facility] where they Gyn/Oncology for multimodality Treatment, which is not available here

## 2019-05-01 NOTE — PROGRESS NOTE ADULT - ASSESSMENT
70 y/o female w/pmhx of HTN,CKD and fibroids comes in w/acute extensive lle DVT and likely metastatic uterine    GYN consult in AM     messages left for PMD Dr. Nilson Parisi -[phone]    Possible outside OBGYN [phone]     patient is not currently interestred in transfer   creatinine clearance precludes NOAC   will plan to bridge t coumadin set up out patient follow up   vascular consult for possible IVC filter

## 2019-05-02 LAB
ANION GAP SERPL CALC-SCNC: 9 MMOL/L — SIGNIFICANT CHANGE UP (ref 5–17)
APTT BLD: 85.7 SEC — HIGH (ref 27.5–36.3)
BUN SERPL-MCNC: 32 MG/DL — HIGH (ref 7–23)
CALCIUM SERPL-MCNC: 9.3 MG/DL — SIGNIFICANT CHANGE UP (ref 8.5–10.1)
CHLORIDE SERPL-SCNC: 104 MMOL/L — SIGNIFICANT CHANGE UP (ref 96–108)
CO2 SERPL-SCNC: 23 MMOL/L — SIGNIFICANT CHANGE UP (ref 22–31)
CREAT SERPL-MCNC: 1.83 MG/DL — HIGH (ref 0.5–1.3)
GLUCOSE SERPL-MCNC: 101 MG/DL — HIGH (ref 70–99)
HCT VFR BLD CALC: 24.1 % — LOW (ref 34.5–45)
HGB BLD-MCNC: 7.5 G/DL — LOW (ref 11.5–15.5)
INR BLD: 1.14 RATIO — SIGNIFICANT CHANGE UP (ref 0.88–1.16)
MAGNESIUM SERPL-MCNC: 2.3 MG/DL — SIGNIFICANT CHANGE UP (ref 1.6–2.6)
MCHC RBC-ENTMCNC: 23.4 PG — LOW (ref 27–34)
MCHC RBC-ENTMCNC: 31.1 GM/DL — LOW (ref 32–36)
MCV RBC AUTO: 75.3 FL — LOW (ref 80–100)
NRBC # BLD: 0 /100 WBCS — SIGNIFICANT CHANGE UP (ref 0–0)
PHOSPHATE SERPL-MCNC: 3.5 MG/DL — SIGNIFICANT CHANGE UP (ref 2.5–4.5)
PLATELET # BLD AUTO: 315 K/UL — SIGNIFICANT CHANGE UP (ref 150–400)
POTASSIUM SERPL-MCNC: 4.9 MMOL/L — SIGNIFICANT CHANGE UP (ref 3.5–5.3)
POTASSIUM SERPL-SCNC: 4.9 MMOL/L — SIGNIFICANT CHANGE UP (ref 3.5–5.3)
PROTHROM AB SERPL-ACNC: 12.8 SEC — SIGNIFICANT CHANGE UP (ref 10–12.9)
RBC # BLD: 3.2 M/UL — LOW (ref 3.8–5.2)
RBC # FLD: 13.7 % — SIGNIFICANT CHANGE UP (ref 10.3–14.5)
SODIUM SERPL-SCNC: 136 MMOL/L — SIGNIFICANT CHANGE UP (ref 135–145)
WBC # BLD: 10.23 K/UL — SIGNIFICANT CHANGE UP (ref 3.8–10.5)
WBC # FLD AUTO: 10.23 K/UL — SIGNIFICANT CHANGE UP (ref 3.8–10.5)

## 2019-05-02 PROCEDURE — 99233 SBSQ HOSP IP/OBS HIGH 50: CPT

## 2019-05-02 RX ORDER — FERROUS SULFATE 325(65) MG
325 TABLET ORAL DAILY
Qty: 0 | Refills: 0 | Status: DISCONTINUED | OUTPATIENT
Start: 2019-05-02 | End: 2019-05-07

## 2019-05-02 RX ORDER — DOCUSATE SODIUM 100 MG
100 CAPSULE ORAL
Qty: 0 | Refills: 0 | Status: DISCONTINUED | OUTPATIENT
Start: 2019-05-02 | End: 2019-05-07

## 2019-05-02 RX ORDER — WARFARIN SODIUM 2.5 MG/1
10 TABLET ORAL ONCE
Qty: 0 | Refills: 0 | Status: COMPLETED | OUTPATIENT
Start: 2019-05-02 | End: 2019-05-02

## 2019-05-02 RX ORDER — POLYETHYLENE GLYCOL 3350 17 G/17G
17 POWDER, FOR SOLUTION ORAL DAILY
Qty: 0 | Refills: 0 | Status: DISCONTINUED | OUTPATIENT
Start: 2019-05-02 | End: 2019-05-07

## 2019-05-02 RX ADMIN — Medication 650 MILLIGRAM(S): at 17:31

## 2019-05-02 RX ADMIN — Medication 325 MILLIGRAM(S): at 12:36

## 2019-05-02 RX ADMIN — LOSARTAN POTASSIUM 100 MILLIGRAM(S): 100 TABLET, FILM COATED ORAL at 05:16

## 2019-05-02 RX ADMIN — HEPARIN SODIUM 1000 UNIT(S)/HR: 5000 INJECTION INTRAVENOUS; SUBCUTANEOUS at 06:12

## 2019-05-02 RX ADMIN — WARFARIN SODIUM 10 MILLIGRAM(S): 2.5 TABLET ORAL at 21:31

## 2019-05-02 NOTE — CHART NOTE - NSCHARTNOTEFT_GEN_A_CORE
[facility]    Outpatient appointment for Dr. Julien 5/7 @ 2:30 PM @ [address] [place] [address] [phone]

## 2019-05-02 NOTE — PHYSICAL THERAPY INITIAL EVALUATION ADULT - BALANCE TRAINING, PT EVAL
pt will increase sitting and standing static and dynamic balance [id number] for safety with ambulation, ADLs and transfers

## 2019-05-02 NOTE — PHYSICAL THERAPY INITIAL EVALUATION ADULT - PERTINENT HX OF CURRENT PROBLEM, REHAB EVAL
68 y/o female w/pmhx of HTN,CKD and fibroids comes in w/acute extensive lle DVT and likely metastatic uterine. Expansion of DVT on MRI - consider Vascular Evaluation for possible Filter, DVT expanding possibly secondary to compression from uterine mass

## 2019-05-02 NOTE — PHYSICAL THERAPY INITIAL EVALUATION ADULT - ADDITIONAL COMMENTS
Pt is R hand dominant, wears glasses, drives, does not have/use assistive devices. Pt lives in a private home with a 2 entrances, front has 4-5 steeper steps and so pt uses back entrance with 3 small half steps to enter with railing. Once inside there are no further steps to negotiate. Pt with (-) history of falls, community ambulator, active lifestyle.

## 2019-05-02 NOTE — PROGRESS NOTE ADULT - ASSESSMENT
70 y/o female w/pmhx of HTN,CKD and fibroids comes in w/acute extensive lle DVT and likely metastatic uterine    GYN consult complete     messages left for PMD Dr. Nilson Parisi -[phone]    Possible outside OBGYN [phone]     patient is not currently interested in transfer   creatinine clearance precludes NOAC   will plan to bridge  with  coumadin set up out patient follow up   vascular consult for possible IVC filter

## 2019-05-02 NOTE — PHYSICAL THERAPY INITIAL EVALUATION ADULT - DISCHARGE DISPOSITION, PT EVAL
rehabilitation facility/short term rehab, pt refusing, if pt is to go home will need a rolling walker and home PT

## 2019-05-02 NOTE — PROGRESS NOTE ADULT - SUBJECTIVE AND OBJECTIVE BOX
HPI:  Pt. is a 68 y/o female w/pmhx of HTN,CKD and fibroids comes in w/lle swelling and pain.  Pt states she has been having abnormal vaginal bleeding, had seen gyn and had in office bx that wasnt revealing and was scheduled for D/C at [facility] on friday.  Pt had noticed swelling of LLE and told anesthesia who wanted LLE US done.  Pt doesnt know of results of US.  pt states she was nervous and anesthesia then wanted to get ct head for which pt was to be admitted, but she didnt want to and left.  she states she noticed more swelling of LLE next day and today she was also getting pain and more swelling which prompted her to come to ed.  pt doesnt re;port any recent travels or trauma.  she denies any fever, chills, sob, cp, palpitations, n/v/d/c no sick contacts. (28 Apr 2019 23:13)    Patient is a 69y old  Female who presents with a chief complaint of LLE  Swelling (02 May 2019 10:17)      INTERVAL HPI/OVERNIGHT EVENTS:    MEDICATIONS  (STANDING):  docusate sodium 100 milliGRAM(s) Oral two times a day  ferrous    sulfate 325 milliGRAM(s) Oral daily  heparin  Infusion.  Unit(s)/Hr (11 mL/Hr) IV Continuous <Continuous>  influenza   Vaccine 0.5 milliLiter(s) IntraMuscular once  losartan 100 milliGRAM(s) Oral daily  warfarin 10 milliGRAM(s) Oral once    MEDICATIONS  (PRN):  acetaminophen   Tablet .. 650 milliGRAM(s) Oral every 6 hours PRN Temp greater or equal to 38C (100.4F)  heparin  Injectable 4500 Unit(s) IV Push every 6 hours PRN For aPTT less than 40  heparin  Injectable 2000 Unit(s) IV Push every 6 hours PRN For aPTT between 40 - 57  polyethylene glycol 3350 17 Gram(s) Oral daily PRN Constipation      Allergies    No Known Allergies    Intolerances        REVIEW OF SYSTEMS:  CONSTITUTIONAL: No fever, weight loss, or fatigue  EYES: No eye pain, visual disturbances, or discharge  ENMT:  No difficulty hearing, tinnitus, vertigo; No sinus or throat pain  NECK: No pain or stiffness  BREASTS: No pain, masses, or nipple discharge  RESPIRATORY: No cough, wheezing, chills or hemoptysis; No shortness of breath  CARDIOVASCULAR: No chest pain, palpitations, dizziness, or leg swelling  GASTROINTESTINAL: No abdominal or epigastric pain. No nausea, vomiting, or hematemesis; No diarrhea or constipation. No melena or hematochezia.  GENITOURINARY: No dysuria, frequency, hematuria, or incontinence  NEUROLOGICAL: No headaches, memory loss, loss of strength, numbness, or tremors  SKIN: No itching, burning, rashes, or lesions   LYMPH NODES: No enlarged glands  ENDOCRINE: No heat or cold intolerance; No hair loss  MUSCULOSKELETAL: No joint pain or swelling; No muscle, back, or extremity pain  PSYCHIATRIC: No depression, anxiety, mood swings, or difficulty sleeping  HEME/LYMPH: No easy bruising, or bleeding gums  ALLERGY AND IMMUNOLOGIC: No hives or eczema    Vital Signs Last 24 Hrs  T(C): 38.1 (02 May 2019 17:34), Max: 38.1 (02 May 2019 17:34)  T(F): 100.6 (02 May 2019 17:34), Max: 100.6 (02 May 2019 17:34)  HR: 81 (02 May 2019 17:35) (80 - 89)  BP: 122/71 (02 May 2019 17:35) (117/67 - 127/74)  BP(mean): --  RR: 18 (02 May 2019 17:35) (17 - 18)  SpO2: 98% (02 May 2019 17:35) (95% - 100%)    PHYSICAL EXAM:  GENERAL: NAD, well-groomed, well-developed  HEAD:  Atraumatic, Normocephalic  EYES: EOMI, PERRLA, conjunctiva and sclera clear  ENMT: No tonsillar erythema, exudates, or enlargement; Moist mucous membranes, Good dentition, No lesions  NECK: Supple, No JVD, Normal thyroid  NERVOUS SYSTEM:  Alert & Oriented X3, Good concentration; Motor Strength 5/5 B/L upper and lower extremities; DTRs 2+ intact and symmetric  CHEST/LUNG: Clear to percussion bilaterally; No rales, rhonchi, wheezing, or rubs  HEART: Regular rate and rhythm; No murmurs, rubs, or gallops  ABDOMEN: Soft, Nontender, Nondistended; Bowel sounds present  EXTREMITIES:  left leg swelling decreasing LYMPH: No lymphadenopathy noted  SKIN: No rashes or lesions    LABS:                        7.5    10.23 )-----------( 315      ( 02 May 2019 06:12 )             24.1     05-02    136  |  104  |  32<H>  ----------------------------<  101<H>  4.9   |  23  |  1.83<H>    Ca    9.3      02 May 2019 06:12  Phos  3.5     05-02  Mg     2.3     05-02    TPro  7.1  /  Alb  2.3<L>  /  TBili  0.4  /  DBili  x   /  AST  54<H>  /  ALT  36  /  AlkPhos  171<H>  05-01    PT/INR - ( 02 May 2019 06:12 )   PT: 12.8 sec;   INR: 1.14 ratio         PTT - ( 02 May 2019 06:12 )  PTT:85.7 sec    CAPILLARY BLOOD GLUCOSE          RADIOLOGY & ADDITIONAL TESTS:    Imaging Personally Reviewed:  [ X] YES  [ ] NO    Consultant(s) Notes Reviewed:  [ X] YES  [ ] NO    Care Discussed with Consultants/Other Providers [X ] YES  [ ] NO

## 2019-05-02 NOTE — PROGRESS NOTE ADULT - SUBJECTIVE AND OBJECTIVE BOX
Lying in bed    Vital Signs Last 24 Hrs  T(C): 37.4 (02 May 2019 05:00), Max: 38.3 (01 May 2019 16:17)  T(F): 99.3 (02 May 2019 05:00), Max: 101 (01 May 2019 16:17)  HR: 80 (02 May 2019 05:00) (80 - 89)  BP: 119/71 (02 May 2019 05:00) (113/66 - 120/70)  BP(mean): --  RR: 17 (02 May 2019 05:00) (17 - 17)  SpO2: 96% (02 May 2019 05:00) (95% - 100%)    PHYSICAL EXAM:    general - AAO x 3  HEENT - No Icterus  CVS - RRR  RS - AE B/L  Abd - soft, NT  Ext - Pulses +        LABS:                        7.5    10.23 )-----------( 315      ( 02 May 2019 06:12 )             24.1     05-02    136  |  104  |  32<H>  ----------------------------<  101<H>  4.9   |  23  |  1.83<H>    Ca    9.3      02 May 2019 06:12  Phos  3.5     05-02  Mg     2.3     05-02    TPro  7.1  /  Alb  2.3<L>  /  TBili  0.4  /  DBili  x   /  AST  54<H>  /  ALT  36  /  AlkPhos  171<H>  05-01    PT/INR - ( 02 May 2019 06:12 )   PT: 12.8 sec;   INR: 1.14 ratio         PTT - ( 02 May 2019 06:12 )  PTT:85.7 sec      Culture - Blood (collected 30 Apr 2019 09:17)  Source: .Blood  Preliminary Report (01 May 2019 10:00):    No growth to date.    Culture - Blood (collected 30 Apr 2019 09:17)  Source: .Blood  Preliminary Report (01 May 2019 10:00):    No growth to date.    Culture - Urine (collected 29 Apr 2019 01:19)  Source: .Urine  Final Report (29 Apr 2019 21:22):    No growth        RADIOLOGY & ADDITIONAL STUDIES:

## 2019-05-02 NOTE — PROGRESS NOTE ADULT - PROBLEM SELECTOR PLAN 1
continue Anticoagulation  - Expansion of DVT on MRI - consider Vascular Evaluation for possible Filter, DVT expanding possibly secondary to compression from uterine mass  - Suggest Transfer/ Patient be seen at [facility] where they Gyn/Oncology for multimodality Treatment, which is not available here.   - Suggested to patient that [facility]

## 2019-05-03 LAB
ANION GAP SERPL CALC-SCNC: 8 MMOL/L — SIGNIFICANT CHANGE UP (ref 5–17)
APTT BLD: 94.5 SEC — HIGH (ref 27.5–36.3)
BUN SERPL-MCNC: 30 MG/DL — HIGH (ref 7–23)
CALCIUM SERPL-MCNC: 9.6 MG/DL — SIGNIFICANT CHANGE UP (ref 8.5–10.1)
CHLORIDE SERPL-SCNC: 103 MMOL/L — SIGNIFICANT CHANGE UP (ref 96–108)
CO2 SERPL-SCNC: 24 MMOL/L — SIGNIFICANT CHANGE UP (ref 22–31)
CREAT SERPL-MCNC: 1.65 MG/DL — HIGH (ref 0.5–1.3)
GLUCOSE SERPL-MCNC: 96 MG/DL — SIGNIFICANT CHANGE UP (ref 70–99)
HCT VFR BLD CALC: 24.8 % — LOW (ref 34.5–45)
HGB BLD-MCNC: 7.6 G/DL — LOW (ref 11.5–15.5)
INR BLD: 1.12 RATIO — SIGNIFICANT CHANGE UP (ref 0.88–1.16)
MCHC RBC-ENTMCNC: 23.5 PG — LOW (ref 27–34)
MCHC RBC-ENTMCNC: 30.6 GM/DL — LOW (ref 32–36)
MCV RBC AUTO: 76.8 FL — LOW (ref 80–100)
NRBC # BLD: 0 /100 WBCS — SIGNIFICANT CHANGE UP (ref 0–0)
PLATELET # BLD AUTO: 339 K/UL — SIGNIFICANT CHANGE UP (ref 150–400)
POTASSIUM SERPL-MCNC: 4.8 MMOL/L — SIGNIFICANT CHANGE UP (ref 3.5–5.3)
POTASSIUM SERPL-SCNC: 4.8 MMOL/L — SIGNIFICANT CHANGE UP (ref 3.5–5.3)
PROTHROM AB SERPL-ACNC: 12.6 SEC — SIGNIFICANT CHANGE UP (ref 10–12.9)
RBC # BLD: 3.23 M/UL — LOW (ref 3.8–5.2)
RBC # FLD: 14 % — SIGNIFICANT CHANGE UP (ref 10.3–14.5)
SODIUM SERPL-SCNC: 135 MMOL/L — SIGNIFICANT CHANGE UP (ref 135–145)
WBC # BLD: 10.1 K/UL — SIGNIFICANT CHANGE UP (ref 3.8–10.5)
WBC # FLD AUTO: 10.1 K/UL — SIGNIFICANT CHANGE UP (ref 3.8–10.5)

## 2019-05-03 PROCEDURE — 99233 SBSQ HOSP IP/OBS HIGH 50: CPT

## 2019-05-03 RX ADMIN — LOSARTAN POTASSIUM 100 MILLIGRAM(S): 100 TABLET, FILM COATED ORAL at 11:56

## 2019-05-03 RX ADMIN — HEPARIN SODIUM 1000 UNIT(S)/HR: 5000 INJECTION INTRAVENOUS; SUBCUTANEOUS at 09:35

## 2019-05-03 RX ADMIN — Medication 325 MILLIGRAM(S): at 11:52

## 2019-05-03 RX ADMIN — Medication 100 MILLIGRAM(S): at 05:46

## 2019-05-03 NOTE — CONSULT NOTE ADULT - PROBLEM SELECTOR RECOMMENDATION 9
Would hold off on IVC filter and Recommend continuing Heparin drip to decrease swelling.  Would not bridge to oral AC, patient may need have procedure in near future

## 2019-05-03 NOTE — PROGRESS NOTE ADULT - ASSESSMENT
68 y/o female w/pmhx of HTN,CKD and fibroids comes in w/acute extensive lle DVT and likely metastatic uterine    GYN consult complete     messages left for PMD Dr. Nilson Parisi -[phone]    Possible outside OBGYN [phone]     patient is AGREEABLE TO TRANSFER ATTEMPTING TRANSFER TO [facility] VS NS

## 2019-05-03 NOTE — CONSULT NOTE ADULT - ASSESSMENT
68 y/o female with LLE DVT, dysfunctional uterine bleeding likely consistent with metastatic Uterine Neoplasm

## 2019-05-03 NOTE — CONSULT NOTE ADULT - PROBLEM SELECTOR RECOMMENDATION 2
MRI reviewed, highly suspicions for metastatic Uterine Neoplasm, would recommend tissue diagnosis with Tertiary facility with GYN ONC

## 2019-05-03 NOTE — PROGRESS NOTE ADULT - SUBJECTIVE AND OBJECTIVE BOX
INTERVAL History:  Left Leg Swelling.  Allergies    No Known Allergies    Intolerances        MEDICATIONS  (STANDING):  docusate sodium 100 milliGRAM(s) Oral two times a day  ferrous    sulfate 325 milliGRAM(s) Oral daily  heparin  Infusion.  Unit(s)/Hr (11 mL/Hr) IV Continuous <Continuous>  influenza   Vaccine 0.5 milliLiter(s) IntraMuscular once  losartan 100 milliGRAM(s) Oral daily    MEDICATIONS  (PRN):  acetaminophen   Tablet .. 650 milliGRAM(s) Oral every 6 hours PRN Temp greater or equal to 38C (100.4F)  heparin  Injectable 4500 Unit(s) IV Push every 6 hours PRN For aPTT less than 40  heparin  Injectable 2000 Unit(s) IV Push every 6 hours PRN For aPTT between 40 - 57  polyethylene glycol 3350 17 Gram(s) Oral daily PRN Constipation      Vital Signs Last 24 Hrs  T(C): 37.8 (03 May 2019 05:15), Max: 38.1 (02 May 2019 17:34)  T(F): 100.1 (03 May 2019 05:15), Max: 100.6 (02 May 2019 17:34)  HR: 80 (03 May 2019 05:15) (80 - 89)  BP: 110/64 (03 May 2019 05:15) (110/64 - 127/74)  BP(mean): --  RR: 18 (03 May 2019 05:15) (17 - 18)  SpO2: 98% (03 May 2019 05:15) (97% - 100%)    PHYSICAL EXAM:      EYES: EOMI, PERRLA, conjunctiva and sclera clear  NECK: Supple, No JVD, Normal thyroid  CHEST/LUNG: Clear to percussion bilaterally; No rales, rhonchi,   HEART: Regular rate and rhythm;   ABDOMEN: Soft, Nontender.    Left leg Swelling.        LABS:                        7.6    10.10 )-----------( 339      ( 03 May 2019 07:02 )             24.8     05-03    135  |  103  |  30<H>  ----------------------------<  96  4.8   |  24  |  1.65<H>    Ca    9.6      03 May 2019 07:02  Phos  3.5     05-02  Mg     2.3     05-02      PT/INR - ( 03 May 2019 07:02 )   PT: 12.6 sec;   INR: 1.12 ratio         PTT - ( 03 May 2019 07:02 )  PTT:94.5 sec        RADIOLOGY & ADDITIONAL STUDIES:    PATHOLOGY:

## 2019-05-03 NOTE — PROGRESS NOTE ADULT - SUBJECTIVE AND OBJECTIVE BOX
Surgery NP note  Patient seen and examined bedside resting comfortably.  No complaints offered. Abdominal pain is well controlled.  Denies nausea and vomiting. Tolerating diet.  Normal flatus/BM.     T(F): 100.1 (05-03-19 @ 05:15), Max: 100.6 (05-02-19 @ 17:34)  HR: 80 (05-03-19 @ 05:15) (80 - 85)  BP: 110/64 (05-03-19 @ 05:15) (110/64 - 127/74)  RR: 18 (05-03-19 @ 05:15) (18 - 18)  SpO2: 98% (05-03-19 @ 05:15) (97% - 98%)  Wt(kg): --  CAPILLARY BLOOD GLUCOSE          PHYSICAL EXAM:  General: NAD, WDWN.   Neuro:  Alert & responsive  HEENT: NCAT, EOMI, conjunctiva clear  CV: +S1+S2 regular rate and rhythm  Lung: clear to ausculation bilaterally, respirations nonlabored, good inspiratory effort  Abdomen: soft, Non tender, No Distension. Normal active BS  Extremities: no pedal edema or calf tenderness noted     LABS:                        7.6    10.10 )-----------( 339      ( 03 May 2019 07:02 )             24.8     05-03    135  |  103  |  30<H>  ----------------------------<  96  4.8   |  24  |  1.65<H>    Ca    9.6      03 May 2019 07:02  Phos  3.5     05-02  Mg     2.3     05-02        PT/INR - ( 03 May 2019 07:02 )   PT: 12.6 sec;   INR: 1.12 ratio         PTT - ( 03 May 2019 07:02 )  PTT:94.5 sec  I&O's Detail    02 May 2019 07:01  -  03 May 2019 07:00  --------------------------------------------------------  IN:    heparin  Infusion.: 120 mL    Oral Fluid: 150 mL  Total IN: 270 mL    OUT:  Total OUT: 0 mL    Total NET: 270 mL            Impression: 69y Female admitted with  PMH Chronic kidney disease  Essential hypertension      Plan:    -continue VTE prophylaxis with SQ heparin and SCDs  -Increase activity with PT, OOB, Ambulate  -Patient instructed on and encouraged incentive spirometry use  -f/u AM labs  -will discuss with surgical attending for Reccomendations Surgery NP note  Patient seen and examined bedside resting comfortably.  No complaints offered.  Tolerating diet.  Normal flatus/BM.     T(F): 100.1 (05-03-19 @ 05:15), Max: 100.6 (05-02-19 @ 17:34)  HR: 80 (05-03-19 @ 05:15) (80 - 85)  BP: 110/64 (05-03-19 @ 05:15) (110/64 - 127/74)  RR: 18 (05-03-19 @ 05:15) (18 - 18)  SpO2: 98% (05-03-19 @ 05:15) (97% - 98%)  Wt(kg): --  CAPILLARY BLOOD GLUCOSE    PHYSICAL EXAM:  General: NAD, WDWN.   Neuro:  Alert & responsive  HEENT: NCAT, EOMI, conjunctiva clear  CV: +S1+S2 regular rate and rhythm  Lung: clear to ausculation bilaterally, respirations nonlabored, good inspiratory effort  Abdomen: soft, Non tender, No Distension. Normal active BS  Extremities: +LLE swelling    LABS:                        7.6    10.10 )-----------( 339      ( 03 May 2019 07:02 )             24.8     05-03    135  |  103  |  30<H>  ----------------------------<  96  4.8   |  24  |  1.65<H>    Ca    9.6      03 May 2019 07:02  Phos  3.5     05-02  Mg     2.3     05-02        PT/INR - ( 03 May 2019 07:02 )   PT: 12.6 sec;   INR: 1.12 ratio         PTT - ( 03 May 2019 07:02 )  PTT:94.5 sec  I&O's Detail    02 May 2019 07:01  -  03 May 2019 07:00  --------------------------------------------------------  IN:    heparin  Infusion.: 120 mL    Oral Fluid: 150 mL  Total IN: 270 mL    OUT:  Total OUT: 0 mL    Total NET: 270 mL

## 2019-05-03 NOTE — PROGRESS NOTE ADULT - SUBJECTIVE AND OBJECTIVE BOX
Patient is a 69y old  Female who presents with a chief complaint of LLE  Swelling (03 May 2019 11:14)      INTERVAL HPI/OVERNIGHT EVENTS: no acute events fovernight seen by vascular nio IVC filter recomended     MEDICATIONS  (STANDING):  docusate sodium 100 milliGRAM(s) Oral two times a day  ferrous    sulfate 325 milliGRAM(s) Oral daily  heparin  Infusion.  Unit(s)/Hr (11 mL/Hr) IV Continuous <Continuous>  influenza   Vaccine 0.5 milliLiter(s) IntraMuscular once  losartan 100 milliGRAM(s) Oral daily    MEDICATIONS  (PRN):  acetaminophen   Tablet .. 650 milliGRAM(s) Oral every 6 hours PRN Temp greater or equal to 38C (100.4F)  heparin  Injectable 4500 Unit(s) IV Push every 6 hours PRN For aPTT less than 40  heparin  Injectable 2000 Unit(s) IV Push every 6 hours PRN For aPTT between 40 - 57  polyethylene glycol 3350 17 Gram(s) Oral daily PRN Constipation      Allergies    No Known Allergies    Intolerances        REVIEW OF SYSTEMS:  CONSTITUTIONAL: No fever, weight loss, or fatigue  EYES: No eye pain, visual disturbances, or discharge  ENMT:  No difficulty hearing, tinnitus, vertigo; No sinus or throat pain  NECK: No pain or stiffness  BREASTS: No pain, masses, or nipple discharge  RESPIRATORY: No cough, wheezing, chills or hemoptysis; No shortness of breath  CARDIOVASCULAR: No chest pain, palpitations, dizziness, or leg swelling  GASTROINTESTINAL: No abdominal or epigastric pain. No nausea, vomiting, or hematemesis; No diarrhea or constipation. No melena or hematochezia.  GENITOURINARY: No dysuria, frequency, hematuria, or incontinence  NEUROLOGICAL: No headaches, memory loss, loss of strength, numbness, or tremors  SKIN: No itching, burning, rashes, or lesions   LYMPH NODES: No enlarged glands  ENDOCRINE: No heat or cold intolerance; No hair loss  MUSCULOSKELETAL: No joint pain or swelling; No muscle, back, or extremity pain  PSYCHIATRIC: No depression, anxiety, mood swings, or difficulty sleeping  HEME/LYMPH: No easy bruising, or bleeding gums  ALLERGY AND IMMUNOLOGIC: No hives or eczema    Vital Signs Last 24 Hrs  T(C): 37.9 (03 May 2019 17:06), Max: 37.9 (03 May 2019 17:06)  T(F): 100.3 (03 May 2019 17:06), Max: 100.3 (03 May 2019 17:06)  HR: 81 (03 May 2019 17:06) (74 - 83)  BP: 134/78 (03 May 2019 17:06) (110/64 - 140/81)  BP(mean): --  RR: 17 (03 May 2019 17:06) (17 - 18)  SpO2: 98% (03 May 2019 17:06) (98% - 100%)    PHYSICAL EXAM:  GENERAL: NAD, well-groomed, well-developed  HEAD:  Atraumatic, Normocephalic  EYES: EOMI, PERRLA, conjunctiva and sclera clear  ENMT: No tonsillar erythema, exudates, or enlargement; Moist mucous membranes, Good dentition, No lesions  NECK: Supple, No JVD, Normal thyroid  NERVOUS SYSTEM:  Alert & Oriented X3, Good concentration; Motor Strength 5/5 B/L upper and lower extremities; DTRs 2+ intact and symmetric  CHEST/LUNG: Clear to percussion bilaterally; No rales, rhonchi, wheezing, or rubs  HEART: Regular rate and rhythm; No murmurs, rubs, or gallops  ABDOMEN: Soft, Nontender, Nondistended; Bowel sounds present  EXTREMITIES: LEFT LEG SWELLING LYMPH: No lymphadenopathy noted  SKIN: No rashes or lesions    LABS:                        7.6    10.10 )-----------( 339      ( 03 May 2019 07:02 )             24.8     05-03    135  |  103  |  30<H>  ----------------------------<  96  4.8   |  24  |  1.65<H>    Ca    9.6      03 May 2019 07:02  Phos  3.5     05-02  Mg     2.3     05-02      PT/INR - ( 03 May 2019 07:02 )   PT: 12.6 sec;   INR: 1.12 ratio         PTT - ( 03 May 2019 07:02 )  PTT:94.5 sec    CAPILLARY BLOOD GLUCOSE          RADIOLOGY & ADDITIONAL TESTS:    Imaging Personally Reviewed:  [x ] YES  [ ] NO    Consultant(s) Notes Reviewed:  [x ] YES  [ ] NO    Care Discussed with Consultants/Other Providers [x ] YES  [ ] NO

## 2019-05-03 NOTE — PROGRESS NOTE ADULT - PROBLEM SELECTOR PLAN 1
likely metastatic uterine neoplasm  would recommend transfer to tertiary facility to be followed by GYN ONC and eventual PRATEEK

## 2019-05-04 LAB
ANION GAP SERPL CALC-SCNC: 8 MMOL/L — SIGNIFICANT CHANGE UP (ref 5–17)
APTT BLD: 103.2 SEC — HIGH (ref 28.5–37)
APTT BLD: 82.8 SEC — HIGH (ref 28.5–37)
BUN SERPL-MCNC: 24 MG/DL — HIGH (ref 7–23)
CALCIUM SERPL-MCNC: 9.4 MG/DL — SIGNIFICANT CHANGE UP (ref 8.5–10.1)
CHLORIDE SERPL-SCNC: 103 MMOL/L — SIGNIFICANT CHANGE UP (ref 96–108)
CO2 SERPL-SCNC: 25 MMOL/L — SIGNIFICANT CHANGE UP (ref 22–31)
CREAT SERPL-MCNC: 1.51 MG/DL — HIGH (ref 0.5–1.3)
GLUCOSE SERPL-MCNC: 95 MG/DL — SIGNIFICANT CHANGE UP (ref 70–99)
HCT VFR BLD CALC: 24.7 % — LOW (ref 34.5–45)
HGB BLD-MCNC: 7.6 G/DL — LOW (ref 11.5–15.5)
INR BLD: 1.16 RATIO — SIGNIFICANT CHANGE UP (ref 0.88–1.16)
MCHC RBC-ENTMCNC: 23.5 PG — LOW (ref 27–34)
MCHC RBC-ENTMCNC: 30.8 GM/DL — LOW (ref 32–36)
MCV RBC AUTO: 76.5 FL — LOW (ref 80–100)
NRBC # BLD: 0 /100 WBCS — SIGNIFICANT CHANGE UP (ref 0–0)
PLATELET # BLD AUTO: 359 K/UL — SIGNIFICANT CHANGE UP (ref 150–400)
POTASSIUM SERPL-MCNC: 4.9 MMOL/L — SIGNIFICANT CHANGE UP (ref 3.5–5.3)
POTASSIUM SERPL-SCNC: 4.9 MMOL/L — SIGNIFICANT CHANGE UP (ref 3.5–5.3)
PROTHROM AB SERPL-ACNC: 13 SEC — HIGH (ref 10–12.9)
RBC # BLD: 3.23 M/UL — LOW (ref 3.8–5.2)
RBC # FLD: 14.2 % — SIGNIFICANT CHANGE UP (ref 10.3–14.5)
SODIUM SERPL-SCNC: 136 MMOL/L — SIGNIFICANT CHANGE UP (ref 135–145)
WBC # BLD: 10.33 K/UL — SIGNIFICANT CHANGE UP (ref 3.8–10.5)
WBC # FLD AUTO: 10.33 K/UL — SIGNIFICANT CHANGE UP (ref 3.8–10.5)

## 2019-05-04 PROCEDURE — 99233 SBSQ HOSP IP/OBS HIGH 50: CPT

## 2019-05-04 RX ADMIN — HEPARIN SODIUM 900 UNIT(S)/HR: 5000 INJECTION INTRAVENOUS; SUBCUTANEOUS at 15:43

## 2019-05-04 RX ADMIN — LOSARTAN POTASSIUM 100 MILLIGRAM(S): 100 TABLET, FILM COATED ORAL at 05:33

## 2019-05-04 RX ADMIN — HEPARIN SODIUM 900 UNIT(S)/HR: 5000 INJECTION INTRAVENOUS; SUBCUTANEOUS at 08:00

## 2019-05-04 RX ADMIN — Medication 325 MILLIGRAM(S): at 11:09

## 2019-05-04 NOTE — PROGRESS NOTE ADULT - SUBJECTIVE AND OBJECTIVE BOX
HPI:  Pt. is a 68 y/o female w/pmhx of HTN,CKD and fibroids comes in w/lle swelling and pain.  Pt states she has been having abnormal vaginal bleeding, had seen gyn and had in office bx that wasnt revealing and was scheduled for D/C at [facility] on friday.  Pt had noticed swelling of LLE and told anesthesia who wanted LLE US done.  Pt doesnt know of results of US.  pt states she was nervous and anesthesia then wanted to get ct head for which pt was to be admitted, but she didnt want to and left.  she states she noticed more swelling of LLE next day and today she was also getting pain and more swelling which prompted her to come to ed.  pt doesnt re;port any recent travels or trauma.  she denies any fever, chills, sob, cp, palpitations, n/v/d/c no sick contacts. (28 Apr 2019 23:13)      Patient is a 69y old  Female who presents with a chief complaint of LLE  Swelling (04 May 2019 10:46)      INTERVAL HPI/OVERNIGHT EVENTS:    MEDICATIONS  (STANDING):  docusate sodium 100 milliGRAM(s) Oral two times a day  ferrous    sulfate 325 milliGRAM(s) Oral daily  heparin  Infusion.  Unit(s)/Hr (11 mL/Hr) IV Continuous <Continuous>  influenza   Vaccine 0.5 milliLiter(s) IntraMuscular once  losartan 100 milliGRAM(s) Oral daily    MEDICATIONS  (PRN):  acetaminophen   Tablet .. 650 milliGRAM(s) Oral every 6 hours PRN Temp greater or equal to 38C (100.4F)  heparin  Injectable 4500 Unit(s) IV Push every 6 hours PRN For aPTT less than 40  heparin  Injectable 2000 Unit(s) IV Push every 6 hours PRN For aPTT between 40 - 57  polyethylene glycol 3350 17 Gram(s) Oral daily PRN Constipation      Allergies    No Known Allergies    Intolerances        REVIEW OF SYSTEMS:  CONSTITUTIONAL: No fever, weight loss, or fatigue  EYES: No eye pain, visual disturbances, or discharge  ENMT:  No difficulty hearing, tinnitus, vertigo; No sinus or throat pain  NECK: No pain or stiffness  BREASTS: No pain, masses, or nipple discharge  RESPIRATORY: No cough, wheezing, chills or hemoptysis; No shortness of breath  CARDIOVASCULAR: No chest pain, palpitations, dizziness, or leg swelling  GASTROINTESTINAL: No abdominal or epigastric pain. No nausea, vomiting, or hematemesis; No diarrhea or constipation. No melena or hematochezia.  GENITOURINARY: No dysuria, frequency, hematuria, or incontinence  NEUROLOGICAL: No headaches, memory loss, loss of strength, numbness, or tremors  SKIN: No itching, burning, rashes, or lesions   LYMPH NODES: No enlarged glands  ENDOCRINE: No heat or cold intolerance; No hair loss  MUSCULOSKELETAL: No joint pain or swelling; No muscle, back, or extremity pain  PSYCHIATRIC: No depression, anxiety, mood swings, or difficulty sleeping  HEME/LYMPH: No easy bruising, or bleeding gums  ALLERGY AND IMMUNOLOGIC: No hives or eczema    Vital Signs Last 24 Hrs  T(C): 36.7 (04 May 2019 12:10), Max: 37.3 (03 May 2019 21:00)  T(F): 98 (04 May 2019 12:10), Max: 99.2 (03 May 2019 21:00)  HR: 74 (04 May 2019 12:10) (74 - 82)  BP: 114/65 (04 May 2019 12:10) (114/65 - 123/79)  BP(mean): --  RR: 18 (04 May 2019 12:10) (18 - 18)  SpO2: 98% (04 May 2019 12:10) (97% - 98%)    PHYSICAL EXAM:  GENERAL: NAD, well-groomed, well-developed  HEAD:  Atraumatic, Normocephalic  EYES: EOMI, PERRLA, conjunctiva and sclera clear  ENMT: No tonsillar erythema, exudates, or enlargement; Moist mucous membranes, Good dentition, No lesions  NECK: Supple, No JVD, Normal thyroid  NERVOUS SYSTEM:  Alert & Oriented X3, Good concentration; Motor Strength 5/5 B/L upper and lower extremities; DTRs 2+ intact and symmetric  CHEST/LUNG: Clear to percussion bilaterally; No rales, rhonchi, wheezing, or rubs  HEART: Regular rate and rhythm; No murmurs, rubs, or gallops  ABDOMEN: Soft, Nontender, Nondistended; Bowel sounds present  EXTREMITIES:  2+ Peripheral Pulses, No clubbing, cyanosis, or edema  LYMPH: No lymphadenopathy noted  SKIN: No rashes or lesions    LABS:                        7.6    10.33 )-----------( 359      ( 04 May 2019 07:36 )             24.7     05-04    136  |  103  |  24<H>  ----------------------------<  95  4.9   |  25  |  1.51<H>    Ca    9.4      04 May 2019 07:36      PT/INR - ( 04 May 2019 07:36 )   PT: 13.0 sec;   INR: 1.16 ratio         PTT - ( 04 May 2019 15:23 )  PTT:82.8 sec    CAPILLARY BLOOD GLUCOSE          RADIOLOGY & ADDITIONAL TESTS:  < from: CT Abdomen and Pelvis No Cont (04.28.19 @ 20:14) >  WER CHEST: Bibasilar nodules and a large mass in the right lower lobe   measuring 6.3 x 4.5 cm. Trace pericardial fluid.    Evaluation the abdominal organs is limited without contrast.  LIVER: Within normal limits.  BILE DUCTS: Normal caliber.  GALLBLADDER: Within normal limits.  SPLEEN: Within normal limits.  PANCREAS: Within normal limits.  ADRENALS: Within normal limits.  KIDNEYS/URETERS: Subcentimeter renal hypodensities, too small to   characterize.    BLADDER: Within normal limits.  REPRODUCTIVE ORGANS: Uterus is markedly enlarged and heterogeneous   measuring 17.7 x 14.8 x 14.5 cm. There are multiple calcified fibroids,   some of which are subserosal. Ovaries are not well visualized.    BOWEL: No bowel obstruction. Appendix is within normal limits.  PERITONEUM: No ascites.  VESSELS:  Stranding surrounding the left externaliliac and common   femoral veins compatible with known DVT.  RETROPERITONEUM: No lymphadenopathy.    ABDOMINAL WALL: Within normal limits.  BONES: Within normal limits.    IMPRESSION: Markedly enlarged, heterogeneous uterus, as described above.   Multiple pulmonary nodules and masses measuring up to 6.3 x 4.5 cm in the   right lower lobe. These findings are most consistent with uterine   malignancy and metastatic disease. Further evaluation with MRI of the   pelvis with and without contrast is recommended.    Asymmetric enlargement of the left external iliac and common femoral   veins compatible with known DVT.      < end of copied text >  < from: MR Pelvis No Cont (04.30.19 @ 16:37) >  e study is limited by respiratory motion and lack of contrast.    The uterus is markedly enlarged and heterogeneous measuring 16.2 x 11.8 x   15.3 cm with innumerable masses and calcifications. Endometrium is not   well visualized. Ovaries are not well visualized.    Urinary bladder is underdistended. Small freefluid in the pelvis.    No evidence of bowel obstruction. Probable small right renal cyst.    There is expansion of the left external iliac and common femoral vein   with surrounding stranding compatible with known DVT.    IMPRESSION:    The study islimited due to motion and lack of intravenous contrast.    Markedly enlarged heterogeneous uterus with multiple masses which may   represent fibroids versus leiomyosarcoma.     Additional findings as above    < end of copied text >    Imaging Personally Reviewed:  [ ] YES  [ ] NO    Consultant(s) Notes Reviewed:  [ ] YES  [ ] NO    Care Discussed with Consultants/Other Providers [ ] YES  [ ] NO

## 2019-05-04 NOTE — PROGRESS NOTE ADULT - SUBJECTIVE AND OBJECTIVE BOX
INTERVAL History:  Left Leg Swelling.  Abdominal ,Mass.    Allergies    No Known Allergies    Intolerances        MEDICATIONS  (STANDING):  docusate sodium 100 milliGRAM(s) Oral two times a day  ferrous    sulfate 325 milliGRAM(s) Oral daily  heparin  Infusion.  Unit(s)/Hr (11 mL/Hr) IV Continuous <Continuous>  influenza   Vaccine 0.5 milliLiter(s) IntraMuscular once  losartan 100 milliGRAM(s) Oral daily    MEDICATIONS  (PRN):  acetaminophen   Tablet .. 650 milliGRAM(s) Oral every 6 hours PRN Temp greater or equal to 38C (100.4F)  heparin  Injectable 4500 Unit(s) IV Push every 6 hours PRN For aPTT less than 40  heparin  Injectable 2000 Unit(s) IV Push every 6 hours PRN For aPTT between 40 - 57  polyethylene glycol 3350 17 Gram(s) Oral daily PRN Constipation      Vital Signs Last 24 Hrs  T(C): 37.3 (04 May 2019 05:25), Max: 37.9 (03 May 2019 17:06)  T(F): 99.2 (04 May 2019 05:25), Max: 100.3 (03 May 2019 17:06)  HR: 78 (04 May 2019 05:25) (74 - 82)  BP: 123/79 (04 May 2019 05:25) (119/68 - 140/81)  BP(mean): --  RR: 18 (04 May 2019 05:25) (17 - 18)  SpO2: 97% (04 May 2019 05:25) (97% - 100%)    PHYSICAL EXAM:      EYES: EOMI, PERRLA, conjunctiva and sclera clear  NECK: Supple, No JVD, Normal thyroid  CHEST/LUNG: Clear to percussion bilaterally; No rales, rhonchi,   HEART: Regular rate and rhythm;   ABDOMEN:   Large Mass:- 15 x 15 cm  in lower Abdomen.        LABS:                        7.6    10.33 )-----------( 359      ( 04 May 2019 07:36 )             24.7     05-04    136  |  103  |  24<H>  ----------------------------<  95  4.9   |  25  |  1.51<H>    Ca    9.4      04 May 2019 07:36      PT/INR - ( 04 May 2019 07:36 )   PT: 13.0 sec;   INR: 1.16 ratio         PTT - ( 04 May 2019 07:36 )  PTT:103.2 sec        RADIOLOGY & ADDITIONAL STUDIES:    PATHOLOGY:

## 2019-05-04 NOTE — PROGRESS NOTE ADULT - ASSESSMENT
68 y/o female w/pmhx of HTN,CKD and fibroids comes in w/acute extensive lle DVT and likely metastatic uterine    GYN consult complete     messages left for PMD Dr. Nilson Parisi -[phone]    Possible outside OBGYN [phone]     patient is AGREEABLE TO TRANSFER ATTEMPTING TRANSFER TO [facility] GYN service denied by hospitalist service

## 2019-05-05 ENCOUNTER — TRANSCRIPTION ENCOUNTER (OUTPATIENT)
Age: 69
End: 2019-05-05

## 2019-05-05 LAB
ANION GAP SERPL CALC-SCNC: 8 MMOL/L — SIGNIFICANT CHANGE UP (ref 5–17)
APTT BLD: 80.5 SEC — HIGH (ref 28.5–37)
BUN SERPL-MCNC: 23 MG/DL — SIGNIFICANT CHANGE UP (ref 7–23)
CALCIUM SERPL-MCNC: 9.3 MG/DL — SIGNIFICANT CHANGE UP (ref 8.5–10.1)
CHLORIDE SERPL-SCNC: 104 MMOL/L — SIGNIFICANT CHANGE UP (ref 96–108)
CO2 SERPL-SCNC: 24 MMOL/L — SIGNIFICANT CHANGE UP (ref 22–31)
CREAT SERPL-MCNC: 1.52 MG/DL — HIGH (ref 0.5–1.3)
CULTURE RESULTS: SIGNIFICANT CHANGE UP
CULTURE RESULTS: SIGNIFICANT CHANGE UP
GLUCOSE SERPL-MCNC: 112 MG/DL — HIGH (ref 70–99)
HCT VFR BLD CALC: 24.1 % — LOW (ref 34.5–45)
HGB BLD-MCNC: 7.5 G/DL — LOW (ref 11.5–15.5)
INR BLD: 1.1 RATIO — SIGNIFICANT CHANGE UP (ref 0.88–1.16)
MAGNESIUM SERPL-MCNC: 1.9 MG/DL — SIGNIFICANT CHANGE UP (ref 1.6–2.6)
MCHC RBC-ENTMCNC: 23.9 PG — LOW (ref 27–34)
MCHC RBC-ENTMCNC: 31.1 GM/DL — LOW (ref 32–36)
MCV RBC AUTO: 76.8 FL — LOW (ref 80–100)
NRBC # BLD: 0 /100 WBCS — SIGNIFICANT CHANGE UP (ref 0–0)
PHOSPHATE SERPL-MCNC: 3.7 MG/DL — SIGNIFICANT CHANGE UP (ref 2.5–4.5)
PLATELET # BLD AUTO: 326 K/UL — SIGNIFICANT CHANGE UP (ref 150–400)
POTASSIUM SERPL-MCNC: 4.7 MMOL/L — SIGNIFICANT CHANGE UP (ref 3.5–5.3)
POTASSIUM SERPL-SCNC: 4.7 MMOL/L — SIGNIFICANT CHANGE UP (ref 3.5–5.3)
PROTHROM AB SERPL-ACNC: 12.3 SEC — SIGNIFICANT CHANGE UP (ref 10–12.9)
RBC # BLD: 3.14 M/UL — LOW (ref 3.8–5.2)
RBC # FLD: 14.6 % — HIGH (ref 10.3–14.5)
SODIUM SERPL-SCNC: 136 MMOL/L — SIGNIFICANT CHANGE UP (ref 135–145)
SPECIMEN SOURCE: SIGNIFICANT CHANGE UP
SPECIMEN SOURCE: SIGNIFICANT CHANGE UP
WBC # BLD: 11.26 K/UL — HIGH (ref 3.8–10.5)
WBC # FLD AUTO: 11.26 K/UL — HIGH (ref 3.8–10.5)

## 2019-05-05 PROCEDURE — 99233 SBSQ HOSP IP/OBS HIGH 50: CPT

## 2019-05-05 RX ORDER — ENOXAPARIN SODIUM 100 MG/ML
60 INJECTION SUBCUTANEOUS
Qty: 30 | Refills: 0 | OUTPATIENT
Start: 2019-05-05 | End: 2019-06-03

## 2019-05-05 RX ORDER — FERROUS SULFATE 325(65) MG
1 TABLET ORAL
Qty: 90 | Refills: 0
Start: 2019-05-05 | End: 2019-06-03

## 2019-05-05 RX ORDER — FERROUS SULFATE 325(65) MG
1 TABLET ORAL
Qty: 0 | Refills: 0 | DISCHARGE
Start: 2019-05-05 | End: 2019-06-03

## 2019-05-05 RX ORDER — LOSARTAN POTASSIUM 100 MG/1
1 TABLET, FILM COATED ORAL
Qty: 0 | Refills: 0 | DISCHARGE
Start: 2019-05-05

## 2019-05-05 RX ORDER — ENOXAPARIN SODIUM 100 MG/ML
50 INJECTION SUBCUTANEOUS EVERY 12 HOURS
Qty: 0 | Refills: 0 | Status: DISCONTINUED | OUTPATIENT
Start: 2019-05-05 | End: 2019-05-05

## 2019-05-05 RX ORDER — ACETAMINOPHEN 500 MG
2 TABLET ORAL
Qty: 0 | Refills: 0 | DISCHARGE
Start: 2019-05-05

## 2019-05-05 RX ORDER — ENOXAPARIN SODIUM 100 MG/ML
60 INJECTION SUBCUTANEOUS DAILY
Qty: 0 | Refills: 0 | Status: DISCONTINUED | OUTPATIENT
Start: 2019-05-05 | End: 2019-05-07

## 2019-05-05 RX ADMIN — Medication 325 MILLIGRAM(S): at 11:15

## 2019-05-05 RX ADMIN — LOSARTAN POTASSIUM 100 MILLIGRAM(S): 100 TABLET, FILM COATED ORAL at 05:41

## 2019-05-05 RX ADMIN — HEPARIN SODIUM 900 UNIT(S)/HR: 5000 INJECTION INTRAVENOUS; SUBCUTANEOUS at 01:22

## 2019-05-05 RX ADMIN — ENOXAPARIN SODIUM 60 MILLIGRAM(S): 100 INJECTION SUBCUTANEOUS at 11:15

## 2019-05-05 NOTE — DISCHARGE NOTE PROVIDER - CARE PROVIDER_API CALL
Kaylin Montes De Oca)  Gynecologic Oncology; Obstetrics and Gynecology  [facility], [address] [place] [address]  Phone: [phone]  Fax: [phone]  Follow Up Time: 1-3 days

## 2019-05-05 NOTE — PROGRESS NOTE ADULT - SUBJECTIVE AND OBJECTIVE BOX
INTERVAL History:  Left Leg swelling.  Allergies    No Known Allergies    Intolerances        MEDICATIONS  (STANDING):  docusate sodium 100 milliGRAM(s) Oral two times a day  enoxaparin Injectable 60 milliGRAM(s) SubCutaneous daily  ferrous    sulfate 325 milliGRAM(s) Oral daily  influenza   Vaccine 0.5 milliLiter(s) IntraMuscular once  losartan 100 milliGRAM(s) Oral daily    MEDICATIONS  (PRN):  acetaminophen   Tablet .. 650 milliGRAM(s) Oral every 6 hours PRN Temp greater or equal to 38C (100.4F)  polyethylene glycol 3350 17 Gram(s) Oral daily PRN Constipation      Vital Signs Last 24 Hrs  T(C): 37.2 (05 May 2019 05:54), Max: 37.8 (04 May 2019 17:20)  T(F): 98.9 (05 May 2019 05:54), Max: 100.1 (04 May 2019 17:20)  HR: 78 (05 May 2019 05:54) (74 - 82)  BP: 121/71 (05 May 2019 05:54) (102/73 - 126/69)  BP(mean): --  RR: 18 (05 May 2019 05:54) (17 - 18)  SpO2: 97% (05 May 2019 05:54) (96% - 98%)    PHYSICAL EXAM:      EYES: EOMI, PERRLA, conjunctiva and sclera clear  NECK: Supple, No JVD, Normal thyroid  CHEST/LUNG: Clear to percussion bilaterally; No rales, rhonchi,   HEART: Regular rate and rhythm;   ABDOMEN:10x10 cm  Lower Abdominal Mass.  Left leg Swelling.          LABS:                        7.5    11.26 )-----------( 326      ( 05 May 2019 06:38 )             24.1     05-05    136  |  104  |  23  ----------------------------<  112<H>  4.7   |  24  |  1.52<H>    Ca    9.3      05 May 2019 06:38  Phos  3.7     05-05  Mg     1.9     05-05      PT/INR - ( 05 May 2019 06:38 )   PT: 12.3 sec;   INR: 1.10 ratio         PTT - ( 04 May 2019 23:48 )  PTT:80.5 sec        RADIOLOGY & ADDITIONAL STUDIES:    PATHOLOGY:

## 2019-05-05 NOTE — DIETITIAN INITIAL EVALUATION ADULT. - PERTINENT MEDS FT
MEDICATIONS  (STANDING):  docusate sodium 100 milliGRAM(s) Oral two times a day  enoxaparin Injectable 60 milliGRAM(s) SubCutaneous daily  ferrous    sulfate 325 milliGRAM(s) Oral daily  influenza   Vaccine 0.5 milliLiter(s) IntraMuscular once  losartan 100 milliGRAM(s) Oral daily    MEDICATIONS  (PRN):  acetaminophen   Tablet .. 650 milliGRAM(s) Oral every 6 hours PRN Temp greater or equal to 38C (100.4F)  polyethylene glycol 3350 17 Gram(s) Oral daily PRN Constipation

## 2019-05-05 NOTE — DISCHARGE NOTE PROVIDER - HOSPITAL COURSE
HPI:    Pt. is a 70 y/o female w/pmhx of HTN,CKD and fibroids comes in w/lle swelling and pain.  Pt states she has been having abnormal vaginal bleeding, had seen gyn and had in office bx that wasnt revealing and was scheduled for D/C at [facility] on friday.  Pt had noticed swelling of LLE and told anesthesia who wanted LLE US done.  Pt doesnt know of results of US.  pt states she was nervous and anesthesia then wanted to get ct head for which pt was to be admitted, but she didnt want to and left.  she states she noticed more swelling of LLE next day and today she was also getting pain and more swelling which prompted her to come to ed.  pt doesnt re;port any recent travels or trauma.  she denies any fever, chills, sob, cp, palpitations, n/v/d/c no sick contacts. (28 Apr 2019 23:13)    8 y/o female w/pmhx of HTN,CKD and fibroids comes in w/acute extensive lle DVT and likely metastatic uterine        GYN consult complete         messages left for PMD Dr. Nilson Parisi -[phone]        Possible outside OBGYN [phone]         patient is AGREEABLE TO TRANSFER ATTEMPTING TRANSFER TO [facility] GYN service denied by hospitalist service and gyn plan     plan is to follow up as out patient With Dr Montes De Oca  affiliated with [facility] as out patient Patient has appointment on Wednesday  May 8th at 11:30 am to plan for possible biopsy or  surgery. Patient will be discharged on lovenox              Problem/Plan - 1:    ·  Problem: Acute deep vein thrombosis (DVT) of iliac vein of left lower extremity.  Plan: Hem onc consult appreciated     heparin    likely w/malignancy    GYN consult          Problem/Plan - 2:    ·  Problem: Essential hypertension.  Plan: cont losartan    hold diuretic for now.          Problem/Plan - 3:    ·  Problem: Stage 3 chronic kidney disease.  Plan: unknown baseline    hold diuretic and monitor better today.          Problem/Plan - 4:    ·  Problem: Malignant neoplasm of uterus, unspecified site.  Plan: pelvic US noted     MRI noted     Onc consult          Problem/Plan - 5:    ·  Problem: Preventive measure.  Plan: on ac. HPI:    Pt. is a 70 y/o female w/pmhx of HTN,CKD and fibroids comes in w/lle swelling and pain.  Pt states she has been having abnormal vaginal bleeding, had seen gyn and had in office bx that wasnt revealing and was scheduled for D/C at [facility] on friday.  Pt had noticed swelling of LLE and told anesthesia who wanted LLE US done.  Pt doesnt know of results of US.  pt states she was nervous and anesthesia then wanted to get ct head for which pt was to be admitted, but she didnt want to and left.  she states she noticed more swelling of LLE next day and today she was also getting pain and more swelling which prompted her to come to ed.  pt doesnt re;port any recent travels or trauma.  she denies any fever, chills, sob, cp, palpitations, n/v/d/c no sick contacts. (28 Apr 2019 23:13)    8 y/o female w/pmhx of HTN,CKD and fibroids comes in w/acute extensive lle DVT and likely metastatic uterine        GYN consult complete         messages left for PMD Dr. Nilson Parisi -[phone]        Possible outside OBGYN [phone]         patient is AGREEABLE TO TRANSFER ATTEMPTING TRANSFER TO [facility] GYN service denied by hospitalist service and gyn plan     plan is to follow up as out patient With Dr Montes De Oca  affiliated with [facility] as out patient Patient has appointment on Wednesday  May 8th at 11:30 am to plan for possible biopsy or  surgery. Patient will be discharged on lovenox         Acute blood loss anemia 2/2 uterine bleeding, s/p 1un prbc today              Problem/Plan - 1:    ·  Problem: Acute deep vein thrombosis (DVT) of iliac vein of left lower extremity.  Plan: Hem onc consult appreciated     heparin    likely w/malignancy    GYN consult          Problem/Plan - 2:    ·  Problem: Essential hypertension.  Plan: cont losartan    hold diuretic for now.          Problem/Plan - 3:    ·  Problem: Stage 3 chronic kidney disease.  Plan: unknown baseline    hold diuretic and monitor better today.          Problem/Plan - 4:    ·  Problem: Malignant neoplasm of uterus, unspecified site.  Plan: pelvic US noted     MRI noted     Onc consult          Problem/Plan - 5:    ·  Problem: Preventive measure.  Plan: on ac. HPI:    Pt. is a 68 y/o female w/pmhx of HTN,CKD and fibroids comes in w/lle swelling and pain.  Pt states she has been having abnormal vaginal bleeding, had seen gyn and had in office bx that wasnt revealing and was scheduled for D/C at [facility] on friday.  Pt had noticed swelling of LLE and told anesthesia who wanted LLE US done.  Pt doesnt know of results of US.  pt states she was nervous and anesthesia then wanted to get ct head for which pt was to be admitted, but she didnt want to and left.  she states she noticed more swelling of LLE next day and today she was also getting pain and more swelling which prompted her to come to ed.  pt doesnt re;port any recent travels or trauma.  she denies any fever, chills, sob, cp, palpitations, n/v/d/c no sick contacts. (28 Apr 2019 23:13)    8 y/o female w/pmhx of HTN,CKD and fibroids comes in w/acute extensive lle DVT and likely metastatic uterine        GYN consult complete         messages left for PMD Dr. Nilson Parisi -[phone]        Possible outside OBGYN [phone]         patient is AGREEABLE TO TRANSFER ATTEMPTING TRANSFER TO [facility] GYN service denied by hospitalist service and gyn plan     plan is to follow up as out patient With Dr Montes De Oca  affiliated with [facility] as out patient Patient has appointment on Wednesday  May 8th at 11:30 am to plan for possible biopsy or  surgery. Patient will be discharged on eliquis, unable to self inject lovenox         Acute blood loss anemia 2/2 uterine bleeding, s/p 1un prbc today hgb is 9.2             Problem/Plan - 1:    ·  Problem: Acute deep vein thrombosis (DVT) of iliac vein of left lower extremity.  Plan: Hem onc consult appreciated     heparin    likely w/malignancy    GYN consult          Problem/Plan - 2:    ·  Problem: Essential hypertension.  Plan: cont losartan    hold diuretic for now.          Problem/Plan - 3:    ·  Problem: Stage 3 chronic kidney disease.  Plan: unknown baseline    hold diuretic and monitor better today.          Problem/Plan - 4:    ·  Problem: Malignant neoplasm of uterus, unspecified site.  Plan: pelvic US noted     MRI noted     Onc consult          Problem/Plan - 5:    ·  Problem: Preventive measure.  Plan: on ac.

## 2019-05-05 NOTE — DISCHARGE NOTE PROVIDER - NSDCCPCAREPLAN_GEN_ALL_CORE_FT
PRINCIPAL DISCHARGE DIAGNOSIS  Diagnosis: DVT (deep venous thrombosis)  Assessment and Plan of Treatment: lovenox 60 mg daily      SECONDARY DISCHARGE DIAGNOSES  Diagnosis: DUB (dysfunctional uterine bleeding)  Assessment and Plan of Treatment: PLease follow up with Dr Montes De Oca on 5/8/2019 at 11:30

## 2019-05-05 NOTE — PROGRESS NOTE ADULT - ASSESSMENT
68 y/o female w/pmhx of HTN,CKD and fibroids comes in w/acute extensive lle DVT and likely metastatic uterine    GYN consult complete     messages left for PMD Dr. Nilson Parisi -[phone]    Possible outside OBGYN [phone]     AGREEABLE TO TRANSFER [facility]EMPTEDTRANSF[facility] TO [facility] GYN service denied by hospitalist service GYN was able to provide a follow up appointment with gyn onc with appointment on Wedsday @ 11:30 please see completed discharge summary   Patient now on Lovenox daily given CKD script sent to pharmacy

## 2019-05-05 NOTE — DIETITIAN INITIAL EVALUATION ADULT. - PERTINENT LABORATORY DATA
05-05 Na136 mmol/L Glu 112 mg/dL<H> K+ 4.7 mmol/L Cr  1.52 mg/dL<H> BUN 23 mg/dL 05-05 Phos 3.7 mg/dL 05-01 Alb 2.3 g/dL<L>05-01 ALT 36 U/L AST 54 U/L<H> Alkaline Phosphatase 171 U/L<H>

## 2019-05-05 NOTE — PROGRESS NOTE ADULT - PROBLEM SELECTOR PLAN 1
Hem onc consult appreciated   likely w/malignancy  placed on lovenox to allow flexibility if procedure needed

## 2019-05-05 NOTE — PROGRESS NOTE ADULT - SUBJECTIVE AND OBJECTIVE BOX
HPI:  Pt. is a 70 y/o female w/pmhx of HTN,CKD and fibroids comes in w/lle swelling and pain.  Pt states she has been having abnormal vaginal bleeding, had seen gyn and had in office bx that wasnt revealing and was scheduled for D/C at [facility] on friday.  Pt had noticed swelling of LLE and told anesthesia who wanted LLE US done.  Pt doesnt know of results of US.  pt states she was nervous and anesthesia then wanted to get ct head for which pt was to be admitted, but she didnt want to and left.  she states she noticed more swelling of LLE next day and today she was also getting pain and more swelling which prompted her to come to ed.  pt doesnt re;port any recent travels or trauma.  she denies any fever, chills, sob, cp, palpitations, n/v/d/c no sick contacts. (28 Apr 2019 23:13)    Patient is a 69y old  Female who presents with a chief complaint of LLE  Swelling (05 May 2019 21:31)      INTERVAL HPI/OVERNIGHT EVENTS: no acute events overnight     MEDICATIONS  (STANDING):  docusate sodium 100 milliGRAM(s) Oral two times a day  enoxaparin Injectable 60 milliGRAM(s) SubCutaneous daily  ferrous    sulfate 325 milliGRAM(s) Oral daily  influenza   Vaccine 0.5 milliLiter(s) IntraMuscular once  losartan 100 milliGRAM(s) Oral daily    MEDICATIONS  (PRN):  acetaminophen   Tablet .. 650 milliGRAM(s) Oral every 6 hours PRN Temp greater or equal to 38C (100.4F)  polyethylene glycol 3350 17 Gram(s) Oral daily PRN Constipation      Allergies    No Known Allergies    Intolerances        REVIEW OF SYSTEMS:  CONSTITUTIONAL: No fever, weight loss, or fatigue  EYES: No eye pain, visual disturbances, or discharge  ENMT:  No difficulty hearing, tinnitus, vertigo; No sinus or throat pain  NECK: No pain or stiffness  BREASTS: No pain, masses, or nipple discharge  RESPIRATORY: No cough, wheezing, chills or hemoptysis; No shortness of breath  CARDIOVASCULAR: No chest pain, palpitations, dizziness, or leg swelling  GASTROINTESTINAL: No abdominal or epigastric pain. No nausea, vomiting, or hematemesis; No diarrhea or constipation. No melena or hematochezia.  GENITOURINARY: No dysuria, frequency, hematuria, or incontinence  NEUROLOGICAL: No headaches, memory loss, loss of strength, numbness, or tremors  SKIN: No itching, burning, rashes, or lesions   LYMPH NODES: No enlarged glands  ENDOCRINE: No heat or cold intolerance; No hair loss  MUSCULOSKELETAL: left leg swelling but feels better PSYCHIATRIC: No depression, anxiety, mood swings, or difficulty sleeping  HEME/LYMPH: No easy bruising, or bleeding gums  ALLERGY AND IMMUNOLOGIC: No hives or eczema    Vital Signs Last 24 Hrs  T(C): 36.6 (05 May 2019 17:15), Max: 37.3 (04 May 2019 23:54)  T(F): 97.8 (05 May 2019 17:15), Max: 99.2 (04 May 2019 23:54)  HR: 79 (05 May 2019 17:15) (78 - 87)  BP: 125/71 (05 May 2019 17:15) (102/70 - 126/69)  BP(mean): --  RR: 17 (05 May 2019 17:15) (16 - 18)  SpO2: 98% (05 May 2019 17:15) (96% - 99%)    PHYSICAL EXAM:  GENERAL: NAD, well-groomed, well-developed  HEAD:  Atraumatic, Normocephalic  EYES: EOMI, PERRLA, conjunctiva and sclera clear  ENMT: No tonsillar erythema, exudates, or enlargement; Moist mucous membranes, Good dentition, No lesions  NECK: Supple, No JVD, Normal thyroid  NERVOUS SYSTEM:  Alert & Oriented X3, Good concentration; Motor Strength 5/5 B/L upper and lower extremities; DTRs 2+ intact and symmetric  CHEST/LUNG: Clear to percussion bilaterally; No rales, rhonchi, wheezing, or rubs  HEART: Regular rate and rhythm; No murmurs, rubs, or gallops  ABDOMEN: Soft, Nontender, Nondistended; Bowel sounds present uterus palatable into abdomen    EXTREMITIES:  2+ Peripheral Pulses, No clubbing, cyanosis, or edema  LYMPH: No lymphadenopathy noted  SKIN: No rashes or lesions    LABS:                        7.5    11.26 )-----------( 326      ( 05 May 2019 06:38 )             24.1     05-05    136  |  104  |  23  ----------------------------<  112<H>  4.7   |  24  |  1.52<H>    Ca    9.3      05 May 2019 06:38  Phos  3.7     05-05  Mg     1.9     05-05      PT/INR - ( 05 May 2019 06:38 )   PT: 12.3 sec;   INR: 1.10 ratio         PTT - ( 04 May 2019 23:48 )  PTT:80.5 sec    CAPILLARY BLOOD GLUCOSE          RADIOLOGY & ADDITIONAL TESTS:    Imaging Personally Reviewed:  [X ] YES  [ ] NO    Consultant(s) Notes Reviewed:  [ X] YES  [ ] NO    Care Discussed with Consultants/Other Providers [ X] YES  [ ] NO

## 2019-05-05 NOTE — DIETITIAN INITIAL EVALUATION ADULT. - OTHER INFO
Pt seen today due to Length Of Stay. She lives home alone, does the food shopping/cooking for herself. She reads the food labels for sodium content of food and is able to verbalize importance of following low salt diet due to hx CKD & HTN. Consuming 75 - 100% of meals. Denies food allergies. Pt without c/o at this time. Pt appears WDWN.

## 2019-05-06 ENCOUNTER — TRANSCRIPTION ENCOUNTER (OUTPATIENT)
Age: 69
End: 2019-05-06

## 2019-05-06 DIAGNOSIS — I82.409 ACUTE EMBOLISM AND THROMBOSIS OF UNSPECIFIED DEEP VEINS OF UNSPECIFIED LOWER EXTREMITY: ICD-10-CM

## 2019-05-06 LAB
ABO RH CONFIRMATION: SIGNIFICANT CHANGE UP
ANION GAP SERPL CALC-SCNC: 8 MMOL/L — SIGNIFICANT CHANGE UP (ref 5–17)
BLD GP AB SCN SERPL QL: SIGNIFICANT CHANGE UP
BUN SERPL-MCNC: 23 MG/DL — SIGNIFICANT CHANGE UP (ref 7–23)
CALCIUM SERPL-MCNC: 9.5 MG/DL — SIGNIFICANT CHANGE UP (ref 8.5–10.1)
CHLORIDE SERPL-SCNC: 106 MMOL/L — SIGNIFICANT CHANGE UP (ref 96–108)
CO2 SERPL-SCNC: 24 MMOL/L — SIGNIFICANT CHANGE UP (ref 22–31)
CREAT SERPL-MCNC: 1.32 MG/DL — HIGH (ref 0.5–1.3)
GLUCOSE SERPL-MCNC: 99 MG/DL — SIGNIFICANT CHANGE UP (ref 70–99)
HCT VFR BLD CALC: 25.8 % — LOW (ref 34.5–45)
HCT VFR BLD CALC: 29 % — LOW (ref 34.5–45)
HGB BLD-MCNC: 7.9 G/DL — LOW (ref 11.5–15.5)
HGB BLD-MCNC: 9.2 G/DL — LOW (ref 11.5–15.5)
MAGNESIUM SERPL-MCNC: 2 MG/DL — SIGNIFICANT CHANGE UP (ref 1.6–2.6)
MCHC RBC-ENTMCNC: 23.4 PG — LOW (ref 27–34)
MCHC RBC-ENTMCNC: 24.6 PG — LOW (ref 27–34)
MCHC RBC-ENTMCNC: 30.6 GM/DL — LOW (ref 32–36)
MCHC RBC-ENTMCNC: 31.7 GM/DL — LOW (ref 32–36)
MCV RBC AUTO: 76.6 FL — LOW (ref 80–100)
MCV RBC AUTO: 77.5 FL — LOW (ref 80–100)
NRBC # BLD: 0 /100 WBCS — SIGNIFICANT CHANGE UP (ref 0–0)
NRBC # BLD: 0 /100 WBCS — SIGNIFICANT CHANGE UP (ref 0–0)
PHOSPHATE SERPL-MCNC: 3.6 MG/DL — SIGNIFICANT CHANGE UP (ref 2.5–4.5)
PLATELET # BLD AUTO: 338 K/UL — SIGNIFICANT CHANGE UP (ref 150–400)
PLATELET # BLD AUTO: 352 K/UL — SIGNIFICANT CHANGE UP (ref 150–400)
POTASSIUM SERPL-MCNC: 4.9 MMOL/L — SIGNIFICANT CHANGE UP (ref 3.5–5.3)
POTASSIUM SERPL-SCNC: 4.9 MMOL/L — SIGNIFICANT CHANGE UP (ref 3.5–5.3)
RBC # BLD: 3.37 M/UL — LOW (ref 3.8–5.2)
RBC # BLD: 3.74 M/UL — LOW (ref 3.8–5.2)
RBC # FLD: 14.8 % — HIGH (ref 10.3–14.5)
RBC # FLD: 15 % — HIGH (ref 10.3–14.5)
SODIUM SERPL-SCNC: 138 MMOL/L — SIGNIFICANT CHANGE UP (ref 135–145)
WBC # BLD: 10.8 K/UL — HIGH (ref 3.8–10.5)
WBC # BLD: 13.39 K/UL — HIGH (ref 3.8–10.5)
WBC # FLD AUTO: 10.8 K/UL — HIGH (ref 3.8–10.5)
WBC # FLD AUTO: 13.39 K/UL — HIGH (ref 3.8–10.5)

## 2019-05-06 PROCEDURE — 99239 HOSP IP/OBS DSCHRG MGMT >30: CPT

## 2019-05-06 RX ADMIN — ENOXAPARIN SODIUM 60 MILLIGRAM(S): 100 INJECTION SUBCUTANEOUS at 12:26

## 2019-05-06 RX ADMIN — LOSARTAN POTASSIUM 100 MILLIGRAM(S): 100 TABLET, FILM COATED ORAL at 06:25

## 2019-05-06 RX ADMIN — Medication 325 MILLIGRAM(S): at 12:27

## 2019-05-06 RX ADMIN — Medication 100 MILLIGRAM(S): at 06:25

## 2019-05-06 RX ADMIN — Medication 650 MILLIGRAM(S): at 21:08

## 2019-05-06 RX ADMIN — Medication 650 MILLIGRAM(S): at 20:38

## 2019-05-06 NOTE — DISCHARGE NOTE NURSING/CASE MANAGEMENT/SOCIAL WORK - NSDCPEPTCOWAR_GEN_ALL_CORE
Coumadin/Warfarin - Compliance/Coumadin/Warfarin - Follow up monitoring/Coumadin/Warfarin - Potential for adverse drug reactions and interactions/Coumadin/Warfarin - Dietary Advice

## 2019-05-06 NOTE — PROGRESS NOTE ADULT - SUBJECTIVE AND OBJECTIVE BOX
INTERVAL History:  Left leg swelling.    Allergies    No Known Allergies    Intolerances        MEDICATIONS  (STANDING):  docusate sodium 100 milliGRAM(s) Oral two times a day  enoxaparin Injectable 60 milliGRAM(s) SubCutaneous daily  ferrous    sulfate 325 milliGRAM(s) Oral daily  influenza   Vaccine 0.5 milliLiter(s) IntraMuscular once  losartan 100 milliGRAM(s) Oral daily    MEDICATIONS  (PRN):  acetaminophen   Tablet .. 650 milliGRAM(s) Oral every 6 hours PRN Temp greater or equal to 38C (100.4F)  polyethylene glycol 3350 17 Gram(s) Oral daily PRN Constipation      Vital Signs Last 24 Hrs  T(C): 37.5 (06 May 2019 05:05), Max: 37.5 (06 May 2019 05:05)  T(F): 99.5 (06 May 2019 05:05), Max: 99.5 (06 May 2019 05:05)  HR: 79 (06 May 2019 05:05) (72 - 87)  BP: 137/80 (06 May 2019 05:05) (102/70 - 137/80)  BP(mean): --  RR: 18 (06 May 2019 05:05) (16 - 18)  SpO2: 99% (06 May 2019 05:05) (98% - 100%)    PHYSICAL EXAM:      EYES: EOMI, PERRLA, conjunctiva and sclera clear  NECK: Supple, No JVD, Normal thyroid  CHEST/LUNG: Clear to percussion bilaterally; No rales, rhonchi,   HEART: Regular rate and rhythm;   ABDOMEN:  10 x10cm lower abdominal mass.  Left Leg:- Swollen.          LABS:                        7.9    10.80 )-----------( 352      ( 06 May 2019 06:28 )             25.8     05-06    138  |  106  |  23  ----------------------------<  99  4.9   |  24  |  1.32<H>    Ca    9.5      06 May 2019 06:28  Phos  3.6     05-06  Mg     2.0     05-06      PT/INR - ( 05 May 2019 06:38 )   PT: 12.3 sec;   INR: 1.10 ratio         PTT - ( 04 May 2019 23:48 )  PTT:80.5 sec        RADIOLOGY & ADDITIONAL STUDIES:    PATHOLOGY:

## 2019-05-06 NOTE — DISCHARGE NOTE NURSING/CASE MANAGEMENT/SOCIAL WORK - NSDCDPATPORTLINK_GEN_ALL_CORE
You can access the [facility] Patient Portal, offered by [facility], by registering with the following website: [email or web address]

## 2019-05-06 NOTE — PROGRESS NOTE ADULT - SUBJECTIVE AND OBJECTIVE BOX
INTERVAL HPI/OVERNIGHT EVENTS:    Patient lying comfortably.  No complaint of abdominal pain.  Patient with vaginal bleeding slow to improve.        Vital Signs Last 24 Hrs  T(C): 37.5 (06 May 2019 05:05), Max: 37.5 (06 May 2019 05:05)  T(F): 99.5 (06 May 2019 05:05), Max: 99.5 (06 May 2019 05:05)  HR: 79 (06 May 2019 05:05) (72 - 87)  BP: 137/80 (06 May 2019 05:05) (102/70 - 137/80)  BP(mean): --  RR: 18 (06 May 2019 05:05) (16 - 18)  SpO2: 99% (06 May 2019 05:05) (98% - 100%)    MEDICATIONS  (STANDING):  docusate sodium 100 milliGRAM(s) Oral two times a day  enoxaparin Injectable 60 milliGRAM(s) SubCutaneous daily  ferrous    sulfate 325 milliGRAM(s) Oral daily  influenza   Vaccine 0.5 milliLiter(s) IntraMuscular once  losartan 100 milliGRAM(s) Oral daily    MEDICATIONS  (PRN):  acetaminophen   Tablet .. 650 milliGRAM(s) Oral every 6 hours PRN Temp greater or equal to 38C (100.4F)  polyethylene glycol 3350 17 Gram(s) Oral daily PRN Constipation      PHYSICAL EXAM:    GENERAL: NAD  HEAD:  Atraumatic, Normocephalic  EYES: EOMI, PERRLA, conjunctiva and sclera clear  CHEST/LUNG: Clear to ausculation, bilaterally   HEART: S1S2  ABDOMEN: softly distended,  Hard uterus  with areas of tenderness size of 24 weeks gestational age, rest of the abd +BS, soft, non tender, no guarding  EXTREMITIES:  calf soft, non tender     I&O's Detail    05 May 2019 07:01  -  06 May 2019 07:00  --------------------------------------------------------  IN:    Oral Fluid: 340 mL  Total IN: 340 mL    OUT:  Total OUT: 0 mL    Total NET: 340 mL          LABS:                        7.9    10.80 )-----------( 352      ( 06 May 2019 06:28 )             25.8     05-06    138  |  106  |  23  ----------------------------<  99  4.9   |  24  |  1.32<H>    Ca    9.5      06 May 2019 06:28  Phos  3.6     05-06  Mg     2.0     05-06      PT/INR - ( 05 May 2019 06:38 )   PT: 12.3 sec;   INR: 1.10 ratio         PTT - ( 04 May 2019 23:48 )  PTT:80.5 sec        Impression:    68 y/o female w/pmhx of HTN,CKD and fibroids comes in w/acute extensive lle DVT and DUB  likely metastatic uterine    Plan:  Cont AC   Supportive care.  Transfuse prn   Once tx of DVT conmplete - endometrium sampling and eventual PRATEEK/BSO   Onc follow up   cont medical management   will discuss with Dr. Julien

## 2019-05-07 VITALS
OXYGEN SATURATION: 100 % | DIASTOLIC BLOOD PRESSURE: 69 MMHG | TEMPERATURE: 97 F | HEART RATE: 87 BPM | RESPIRATION RATE: 17 BRPM | SYSTOLIC BLOOD PRESSURE: 112 MMHG

## 2019-05-07 PROBLEM — I10 ESSENTIAL (PRIMARY) HYPERTENSION: Chronic | Status: ACTIVE | Noted: 2019-04-28

## 2019-05-07 PROBLEM — N18.9 CHRONIC KIDNEY DISEASE, UNSPECIFIED: Chronic | Status: ACTIVE | Noted: 2019-04-28

## 2019-05-07 PROCEDURE — 99232 SBSQ HOSP IP/OBS MODERATE 35: CPT

## 2019-05-07 RX ORDER — APIXABAN 2.5 MG/1
1 TABLET, FILM COATED ORAL
Qty: 60 | Refills: 0
Start: 2019-05-07 | End: 2019-06-05

## 2019-05-07 RX ORDER — APIXABAN 2.5 MG/1
5 TABLET, FILM COATED ORAL EVERY 12 HOURS
Qty: 0 | Refills: 0 | Status: DISCONTINUED | OUTPATIENT
Start: 2019-05-07 | End: 2019-05-07

## 2019-05-07 RX ORDER — APIXABAN 2.5 MG/1
1 TABLET, FILM COATED ORAL
Qty: 14 | Refills: 0 | OUTPATIENT
Start: 2019-05-07 | End: 2019-05-13

## 2019-05-07 RX ORDER — APIXABAN 2.5 MG/1
1 TABLET, FILM COATED ORAL
Qty: 60 | Refills: 0 | OUTPATIENT
Start: 2019-05-07 | End: 2019-06-05

## 2019-05-07 RX ADMIN — Medication 325 MILLIGRAM(S): at 12:00

## 2019-05-07 RX ADMIN — APIXABAN 5 MILLIGRAM(S): 2.5 TABLET, FILM COATED ORAL at 12:00

## 2019-05-07 RX ADMIN — LOSARTAN POTASSIUM 100 MILLIGRAM(S): 100 TABLET, FILM COATED ORAL at 06:39

## 2019-05-07 RX ADMIN — Medication 100 MILLIGRAM(S): at 06:39

## 2019-05-07 NOTE — PROGRESS NOTE ADULT - PROBLEM SELECTOR PLAN 3
unknown baseline  hold diuretic and monitor better today
unknown baseline  hold diuretic and monitor better today
unknown baseline  hold diuretic and monitor
unknown baseline  hold diuretic and monitor better today

## 2019-05-07 NOTE — PROGRESS NOTE ADULT - PROVIDER SPECIALTY LIST ADULT
GYN
GYN
Heme/Onc
Hospitalist
Surgery
Hospitalist
Hospitalist

## 2019-05-07 NOTE — PROGRESS NOTE ADULT - NSHPATTENDINGPLANDISCUSS_GEN_ALL_CORE
PAtient Gyn
Patient Gyn
PATIENT SURGICAL PA ER ATTENDING
PAtient Gyn

## 2019-05-07 NOTE — PROGRESS NOTE ADULT - ASSESSMENT
68 y/o female w/pmhx of HTN,CKD and fibroids comes in w/acute extensive lle DVT and likely metastatic uterine    GYN consult complete     messages left for PMD Dr. Nilson Parisi [phone]    Possible outside OBGYN [phone]     [facility]ist service GYN was able to provide a follow up appointment with gyn onc with appointment on Wedsday @ 11:30 please see completed discharge summary      Given 1unprbc  pt will not self inject lovenox, started on eliquis

## 2019-05-07 NOTE — PROGRESS NOTE ADULT - PROBLEM SELECTOR PROBLEM 3
Stage 3 chronic kidney disease

## 2019-05-07 NOTE — PROGRESS NOTE ADULT - SUBJECTIVE AND OBJECTIVE BOX
INTERVAL History:  Left Leg Swelling    Allergies    No Known Allergies    Intolerances        MEDICATIONS  (STANDING):  apixaban 5 milliGRAM(s) Oral every 12 hours  docusate sodium 100 milliGRAM(s) Oral two times a day  ferrous    sulfate 325 milliGRAM(s) Oral daily  losartan 100 milliGRAM(s) Oral daily    MEDICATIONS  (PRN):  acetaminophen   Tablet .. 650 milliGRAM(s) Oral every 6 hours PRN Temp greater or equal to 38C (100.4F)  polyethylene glycol 3350 17 Gram(s) Oral daily PRN Constipation      Vital Signs Last 24 Hrs  T(C): 36.6 (07 May 2019 05:18), Max: 38 (06 May 2019 20:25)  T(F): 97.9 (07 May 2019 05:18), Max: 100.4 (06 May 2019 20:25)  HR: 75 (07 May 2019 05:18) (75 - 90)  BP: 135/75 (07 May 2019 05:18) (103/75 - 141/57)  BP(mean): --  RR: 16 (07 May 2019 05:18) (16 - 17)  SpO2: 99% (07 May 2019 05:18) (97% - 100%)    PHYSICAL EXAM:      EYES: EOMI, PERRLA, conjunctiva and sclera clear  NECK: Supple, No JVD, Normal thyroid  CHEST/LUNG: Clear to percussion bilaterally; No rales, rhonchi,   HEART: Regular rate and rhythm;   ABDOMEN:   Lower Abdominal Mass.    Left leg Swelling.        LABS:                        9.2    13.39 )-----------( 338      ( 06 May 2019 23:21 )             29.0     05-06    138  |  106  |  23  ----------------------------<  99  4.9   |  24  |  1.32<H>    Ca    9.5      06 May 2019 06:28  Phos  3.6     05-06  Mg     2.0     05-06              RADIOLOGY & ADDITIONAL STUDIES:    PATHOLOGY:

## 2019-05-07 NOTE — PROGRESS NOTE ADULT - PROBLEM SELECTOR PLAN 4
pelvic US noted   MRI noted   Onc consult  gyn consult  Will try to call out patient gyn in am
check pelvic US  Onc consult  gyn consult  Will try to call out patient gyn in am
pelvic US noted   MRI noted   Onc consult  gyn consult  Will try to call out patient gyn in am

## 2019-05-07 NOTE — PROGRESS NOTE ADULT - SUBJECTIVE AND OBJECTIVE BOX
Patient is a 69y old  Female who presents with a chief complaint of LLE  Swelling (07 May 2019 10:19)      INTERVAL HPI/OVERNIGHT EVENTS: no events     MEDICATIONS  (STANDING):  apixaban 5 milliGRAM(s) Oral every 12 hours  docusate sodium 100 milliGRAM(s) Oral two times a day  ferrous    sulfate 325 milliGRAM(s) Oral daily  losartan 100 milliGRAM(s) Oral daily    MEDICATIONS  (PRN):  acetaminophen   Tablet .. 650 milliGRAM(s) Oral every 6 hours PRN Temp greater or equal to 38C (100.4F)  polyethylene glycol 3350 17 Gram(s) Oral daily PRN Constipation      Allergies    No Known Allergies    Intolerances        REVIEW OF SYSTEMS:  CONSTITUTIONAL: No fever, weight loss, or fatigue  EYES: No eye pain, visual disturbances, or discharge  ENMT:  No difficulty hearing, tinnitus, vertigo; No sinus or throat pain  NECK: No pain or stiffness  BREASTS: No pain, masses, or nipple discharge  RESPIRATORY: No cough, wheezing, chills or hemoptysis; No shortness of breath  CARDIOVASCULAR: No chest pain, palpitations, dizziness, or leg swelling  GASTROINTESTINAL: No abdominal or epigastric pain. No nausea, vomiting, or hematemesis; No diarrhea or constipation. No melena or hematochezia.  GENITOURINARY: No dysuria, frequency, hematuria, or incontinence  NEUROLOGICAL: No headaches, memory loss, loss of strength, numbness, or tremors  SKIN: No itching, burning, rashes, or lesions   LYMPH NODES: No enlarged glands  ENDOCRINE: No heat or cold intolerance; No hair loss  MUSCULOSKELETAL: No joint pain or swelling; No muscle, back, or extremity pain  PSYCHIATRIC: No depression, anxiety, mood swings, or difficulty sleeping  HEME/LYMPH: No easy bruising, or bleeding gums  ALLERGY AND IMMUNOLOGIC: No hives or eczema    Vital Signs Last 24 Hrs  T(C): 36.6 (07 May 2019 05:18), Max: 38 (06 May 2019 20:25)  T(F): 97.9 (07 May 2019 05:18), Max: 100.4 (06 May 2019 20:25)  HR: 75 (07 May 2019 05:18) (75 - 90)  BP: 135/75 (07 May 2019 05:18) (103/75 - 141/57)  BP(mean): --  RR: 16 (07 May 2019 05:18) (16 - 17)  SpO2: 99% (07 May 2019 05:18) (97% - 100%)    PHYSICAL EXAM:  GENERAL: NAD, well-groomed, well-developed  HEAD:  Atraumatic, Normocephalic  EYES: EOMI, PERRLA, conjunctiva and sclera clear  ENMT: No tonsillar erythema, exudates, or enlargement; Moist mucous membranes, Good dentition, No lesions  NECK: Supple, No JVD, Normal thyroid  NERVOUS SYSTEM:  Alert & Oriented X3, Good concentration; Motor Strength 5/5 B/L upper and lower extremities; DTRs 2+ intact and symmetric  CHEST/LUNG: Clear to percussion bilaterally; No rales, rhonchi, wheezing, or rubs  HEART: Regular rate and rhythm; No murmurs, rubs, or gallops  ABDOMEN: Soft, Nontender, Nondistended; Bowel sounds present  EXTREMITIES:  2+ Peripheral Pulses, No clubbing, cyanosis, or edema  LYMPH: No lymphadenopathy noted  SKIN: No rashes or lesions    LABS:                        9.2    13.39 )-----------( 338      ( 06 May 2019 23:21 )             29.0     05-06    138  |  106  |  23  ----------------------------<  99  4.9   |  24  |  1.32<H>    Ca    9.5      06 May 2019 06:28  Phos  3.6     05-06  Mg     2.0     05-06          CAPILLARY BLOOD GLUCOSE          RADIOLOGY & ADDITIONAL TESTS:    Imaging Personally Reviewed:  [ X] YES  [ ] NO    Consultant(s) Notes Reviewed:  [ X] YES  [ ] NO    Care Discussed with Consultants/Other Providers [X ] YES  [ ] NO

## 2019-05-07 NOTE — PROGRESS NOTE ADULT - PROBLEM SELECTOR PROBLEM 4
Malignant neoplasm of uterus, unspecified site

## 2019-05-08 ENCOUNTER — APPOINTMENT (OUTPATIENT)
Dept: GYNECOLOGIC ONCOLOGY | Facility: CLINIC | Age: 69
End: 2019-05-08
Payer: MEDICARE

## 2019-05-08 VITALS
BODY MASS INDEX: 21.22 KG/M2 | SYSTOLIC BLOOD PRESSURE: 134 MMHG | DIASTOLIC BLOOD PRESSURE: 87 MMHG | HEART RATE: 97 BPM | WEIGHT: 127.38 LBS | HEIGHT: 65 IN

## 2019-05-08 DIAGNOSIS — I82.409 ACUTE EMBOLISM AND THROMBOSIS OF UNSPECIFIED DEEP VEINS OF UNSPECIFIED LOWER EXTREMITY: ICD-10-CM

## 2019-05-08 DIAGNOSIS — I10 ESSENTIAL (PRIMARY) HYPERTENSION: ICD-10-CM

## 2019-05-08 DIAGNOSIS — Z60.2 PROBLEMS RELATED TO LIVING ALONE: ICD-10-CM

## 2019-05-08 DIAGNOSIS — N18.9 CHRONIC KIDNEY DISEASE, UNSPECIFIED: ICD-10-CM

## 2019-05-08 PROBLEM — Z00.00 ENCOUNTER FOR PREVENTIVE HEALTH EXAMINATION: Status: ACTIVE | Noted: 2019-05-08

## 2019-05-08 PROCEDURE — 58100 BIOPSY OF UTERUS LINING: CPT

## 2019-05-08 PROCEDURE — 99205 OFFICE O/P NEW HI 60 MIN: CPT | Mod: 25

## 2019-05-08 RX ORDER — APIXABAN 5 MG/1
TABLET, FILM COATED ORAL
Refills: 0 | Status: ACTIVE | COMMUNITY

## 2019-05-08 RX ORDER — LOSARTAN POTASSIUM 100 MG/1
TABLET, FILM COATED ORAL
Refills: 0 | Status: ACTIVE | COMMUNITY

## 2019-05-08 SDOH — SOCIAL STABILITY - SOCIAL INSECURITY: PROBLEMS RELATED TO LIVING ALONE: Z60.2

## 2019-05-09 ENCOUNTER — FORM ENCOUNTER (OUTPATIENT)
Age: 69
End: 2019-05-09

## 2019-05-10 ENCOUNTER — APPOINTMENT (OUTPATIENT)
Dept: CT IMAGING | Facility: IMAGING CENTER | Age: 69
End: 2019-05-10
Payer: MEDICARE

## 2019-05-10 ENCOUNTER — OUTPATIENT (OUTPATIENT)
Dept: OUTPATIENT SERVICES | Facility: HOSPITAL | Age: 69
LOS: 1 days | End: 2019-05-10
Payer: MEDICARE

## 2019-05-10 DIAGNOSIS — R91.1 SOLITARY PULMONARY NODULE: ICD-10-CM

## 2019-05-10 PROCEDURE — 71250 CT THORAX DX C-: CPT

## 2019-05-10 PROCEDURE — 71250 CT THORAX DX C-: CPT | Mod: 26

## 2019-05-16 DIAGNOSIS — I12.9 HYPERTENSIVE CHRONIC KIDNEY DISEASE WITH STAGE 1 THROUGH STAGE 4 CHRONIC KIDNEY DISEASE, OR UNSPECIFIED CHRONIC KIDNEY DISEASE: ICD-10-CM

## 2019-05-16 DIAGNOSIS — C55 MALIGNANT NEOPLASM OF UTERUS, PART UNSPECIFIED: ICD-10-CM

## 2019-05-16 DIAGNOSIS — I82.422 ACUTE EMBOLISM AND THROMBOSIS OF LEFT ILIAC VEIN: ICD-10-CM

## 2019-05-16 DIAGNOSIS — N93.8 OTHER SPECIFIED ABNORMAL UTERINE AND VAGINAL BLEEDING: ICD-10-CM

## 2019-05-16 DIAGNOSIS — D62 ACUTE POSTHEMORRHAGIC ANEMIA: ICD-10-CM

## 2019-05-16 DIAGNOSIS — C79.9 SECONDARY MALIGNANT NEOPLASM OF UNSPECIFIED SITE: ICD-10-CM

## 2019-05-16 DIAGNOSIS — N18.3 CHRONIC KIDNEY DISEASE, STAGE 3 (MODERATE): ICD-10-CM

## 2019-05-16 DIAGNOSIS — M79.89 OTHER SPECIFIED SOFT TISSUE DISORDERS: ICD-10-CM

## 2019-05-17 LAB — CORE LAB BIOPSY: NORMAL

## 2019-05-20 ENCOUNTER — FORM ENCOUNTER (OUTPATIENT)
Age: 69
End: 2019-05-20

## 2019-05-20 ENCOUNTER — APPOINTMENT (OUTPATIENT)
Dept: INTERVENTIONAL RADIOLOGY/VASCULAR | Facility: CLINIC | Age: 69
End: 2019-05-20
Payer: MEDICARE

## 2019-05-20 VITALS — SYSTOLIC BLOOD PRESSURE: 155 MMHG | HEART RATE: 95 BPM | OXYGEN SATURATION: 99 % | DIASTOLIC BLOOD PRESSURE: 95 MMHG

## 2019-05-20 VITALS — HEIGHT: 65 IN | BODY MASS INDEX: 20.66 KG/M2 | RESPIRATION RATE: 16 BRPM | WEIGHT: 124 LBS

## 2019-05-20 DIAGNOSIS — R91.1 SOLITARY PULMONARY NODULE: ICD-10-CM

## 2019-05-20 PROCEDURE — 99204 OFFICE O/P NEW MOD 45 MIN: CPT

## 2019-05-20 RX ORDER — CHLORTHALIDONE 50 MG/1
TABLET ORAL
Refills: 0 | Status: COMPLETED | COMMUNITY
End: 2019-05-20

## 2019-05-20 NOTE — HISTORY OF PRESENT ILLNESS
[FreeTextEntry1] : 69 years old female with recent hx of LLE DVT. She also had vaginal bleeding which started in February. 4/30/19- TVS- Markedly enlarged heterogeneous uterus 18.5 X 12.4 X 14 CM. Bilateral ovaries WNL. Debris vs nodularity along the right urinary bladder wall.  4/28/19 CT A/P - markedly enlarged heterogeneous uterus 17.7 x 14.8 x 14.5 cm. Ovaries are not well visualized. Multiple pulmonary nodules and masses measuring up to 6.3 x 4.5 cm in the right lower lobe. Asymmetric enlargement of the left external iliac and common femoral veins compatible with known DVT.No lymphadenopathy.4/28/19- extensive left lower extremity deep vein thrombosis above and below the knee. \par \par She was seen by Dr. Montes De Oca and referred for lung mass biopsy. \par \par Patient sates she has been feeling well overall. She states she has appetite but she can not decide what to eat and has lost few lbs in  the past few weeks. \par \par Denies any recent SOB, CP, fever, chills, n/v/d. \par \par Patient is on Eliquis and she stated she was given in the hospital upon discharge. She has not seen hematologist since discharge. \par Patient aware to hold Eliquis after IVC filter placement tomorrow for lung bx on Thursday.

## 2019-05-20 NOTE — PHYSICAL EXAM
[Alert] : alert [Normal Hearing] : hearing was normal [Normal Rate] : heart rate was normal  [Oriented x3] : oriented to person, place, and time [de-identified] : slow gait

## 2019-05-20 NOTE — REVIEW OF SYSTEMS
[Fever] : no fever [Chills] : no chills [Loss Of Hearing] : no hearing loss [Shortness Of Breath] : no shortness of breath [Easy Bleeding] : no tendency for easy bleeding [Easy Bruising] : no tendency for easy bruising

## 2019-05-20 NOTE — ASSESSMENT
[FreeTextEntry1] : 69 year old female with what appears to be metastatic uterine cancer with multiple bilateral daniel masses, LE DVT, on Eliquis needs Lung mass biopsy for metastatic work up. Patient needs to hold anticoagulation for the lung mass biopsy and for that reason is an appropriate candidate for retrievable IVC filter placement which could be removed as soon as patient can be placed back on anticoagulation. \par \par The IVC filter placement, IVC filter removal, CT guided lung mass needle biopsy, risks of the procedures were discussed with the patient and informed consents for IVC filter placement and lung mass biopsy were obtained. \par

## 2019-05-20 NOTE — CONSULT LETTER
[Dear  ___] : Dear  [unfilled], [Consult Letter:] : I had the pleasure of evaluating your patient, [unfilled]. [Please see my note below.] : Please see my note below. [Consult Closing:] : Thank you very much for allowing me to participate in the care of this patient.  If you have any questions, please do not hesitate to contact me. [Sincerely,] : Sincerely, [FreeTextEntry2] : Dr. Kaylin Montes De Oca

## 2019-05-21 ENCOUNTER — OUTPATIENT (OUTPATIENT)
Dept: OUTPATIENT SERVICES | Facility: HOSPITAL | Age: 69
LOS: 1 days | End: 2019-05-21
Payer: MEDICARE

## 2019-05-21 DIAGNOSIS — I82.409 ACUTE EMBOLISM AND THROMBOSIS OF UNSPECIFIED DEEP VEINS OF UNSPECIFIED LOWER EXTREMITY: ICD-10-CM

## 2019-05-21 PROCEDURE — 37191 INS ENDOVAS VENA CAVA FILTR: CPT

## 2019-05-22 ENCOUNTER — FORM ENCOUNTER (OUTPATIENT)
Age: 69
End: 2019-05-22

## 2019-05-23 ENCOUNTER — OUTPATIENT (OUTPATIENT)
Dept: OUTPATIENT SERVICES | Facility: HOSPITAL | Age: 69
LOS: 1 days | End: 2019-05-23
Payer: MEDICARE

## 2019-05-23 ENCOUNTER — RESULT REVIEW (OUTPATIENT)
Age: 69
End: 2019-05-23

## 2019-05-23 DIAGNOSIS — R91.8 OTHER NONSPECIFIC ABNORMAL FINDING OF LUNG FIELD: ICD-10-CM

## 2019-05-23 PROCEDURE — 88341 IMHCHEM/IMCYTCHM EA ADD ANTB: CPT | Mod: 26

## 2019-05-23 PROCEDURE — 88305 TISSUE EXAM BY PATHOLOGIST: CPT | Mod: 26

## 2019-05-23 PROCEDURE — 88342 IMHCHEM/IMCYTCHM 1ST ANTB: CPT | Mod: 26,59

## 2019-05-23 PROCEDURE — 32405: CPT

## 2019-05-23 PROCEDURE — 71045 X-RAY EXAM CHEST 1 VIEW: CPT | Mod: 26

## 2019-05-23 PROCEDURE — 77012 CT SCAN FOR NEEDLE BIOPSY: CPT | Mod: 26

## 2019-05-23 PROCEDURE — 88173 CYTOPATH EVAL FNA REPORT: CPT | Mod: 26

## 2019-05-24 LAB
GRAM STN WND: SIGNIFICANT CHANGE UP
SPECIMEN SOURCE: SIGNIFICANT CHANGE UP

## 2019-05-27 LAB — SPECIMEN SOURCE: SIGNIFICANT CHANGE UP

## 2019-05-28 DIAGNOSIS — N85.8 OTHER SPECIFIED NONINFLAMMATORY DISORDERS OF UTERUS: ICD-10-CM

## 2019-05-28 DIAGNOSIS — I82.409 ACUTE EMBOLISM AND THROMBOSIS OF UNSPECIFIED DEEP VEINS OF UNSPECIFIED LOWER EXTREMITY: ICD-10-CM

## 2019-05-29 DIAGNOSIS — R91.8 OTHER NONSPECIFIC ABNORMAL FINDING OF LUNG FIELD: ICD-10-CM

## 2019-05-29 LAB — CULTURE - SURGICAL SITE: SIGNIFICANT CHANGE UP

## 2019-05-30 ENCOUNTER — FORM ENCOUNTER (OUTPATIENT)
Age: 69
End: 2019-05-30

## 2019-05-31 ENCOUNTER — APPOINTMENT (OUTPATIENT)
Dept: ULTRASOUND IMAGING | Facility: CLINIC | Age: 69
End: 2019-05-31
Payer: MEDICARE

## 2019-05-31 ENCOUNTER — OUTPATIENT (OUTPATIENT)
Dept: OUTPATIENT SERVICES | Facility: HOSPITAL | Age: 69
LOS: 1 days | End: 2019-05-31
Payer: MEDICARE

## 2019-05-31 ENCOUNTER — INPATIENT (INPATIENT)
Facility: HOSPITAL | Age: 69
LOS: 3 days | Discharge: HOME | End: 2019-06-04
Attending: INTERNAL MEDICINE | Admitting: INTERNAL MEDICINE
Payer: MEDICARE

## 2019-05-31 VITALS
TEMPERATURE: 99 F | HEART RATE: 109 BPM | DIASTOLIC BLOOD PRESSURE: 90 MMHG | RESPIRATION RATE: 18 BRPM | SYSTOLIC BLOOD PRESSURE: 148 MMHG | OXYGEN SATURATION: 100 %

## 2019-05-31 DIAGNOSIS — I82.409 ACUTE EMBOLISM AND THROMBOSIS OF UNSPECIFIED DEEP VEINS OF UNSPECIFIED LOWER EXTREMITY: ICD-10-CM

## 2019-05-31 LAB
APTT BLD: 27.7 SEC — SIGNIFICANT CHANGE UP (ref 27.5–36.3)
HCT VFR BLD CALC: 29.1 % — LOW (ref 34.5–45)
HGB BLD-MCNC: 8.7 G/DL — LOW (ref 11.5–15.5)
INR BLD: 1.2 — HIGH (ref 0.88–1.17)
MCHC RBC-ENTMCNC: 22.8 PG — LOW (ref 27–34)
MCHC RBC-ENTMCNC: 29.9 % — LOW (ref 32–36)
MCV RBC AUTO: 76.4 FL — LOW (ref 80–100)
NRBC # FLD: 0 K/UL — SIGNIFICANT CHANGE UP (ref 0–0)
PLATELET # BLD AUTO: 348 K/UL — SIGNIFICANT CHANGE UP (ref 150–400)
PMV BLD: 9.4 FL — SIGNIFICANT CHANGE UP (ref 7–13)
PROTHROM AB SERPL-ACNC: 13.4 SEC — HIGH (ref 9.8–13.1)
RBC # BLD: 3.81 M/UL — SIGNIFICANT CHANGE UP (ref 3.8–5.2)
RBC # FLD: 16.8 % — HIGH (ref 10.3–14.5)
WBC # BLD: 22.18 K/UL — HIGH (ref 3.8–10.5)
WBC # FLD AUTO: 22.18 K/UL — HIGH (ref 3.8–10.5)

## 2019-05-31 PROCEDURE — 93970 EXTREMITY STUDY: CPT | Mod: 26

## 2019-05-31 PROCEDURE — 93970 EXTREMITY STUDY: CPT

## 2019-05-31 NOTE — ED ADULT NURSE NOTE - OBJECTIVE STATEMENT
pt received alert and oriented x3. pt c.o having b/l le swelling, right more swollen than left for a couple days. pt states that her doctor told her to come off her blood thinner s.p IVFilter was placed. pt has a ultrasound that showed right leg blood clot. pt declines chest pain,sob,dizziness,n/v/d. 20g placed in right a/c. labs drawn and sent. Call bell in reach, warm blanket provided, bed in lowest position, side rails up x2,safety maintained. will continue to monitor.

## 2019-05-31 NOTE — ED ADULT NURSE NOTE - NSIMPLEMENTINTERV_GEN_ALL_ED
Implemented All Fall with Harm Risk Interventions:  [place] to call system. Call bell, personal items and telephone within reach. Instruct patient to call for assistance. Room bathroom lighting operational. Non-slip footwear when patient is off stretcher. Physically safe environment: no spills, clutter or unnecessary equipment. Stretcher in lowest position, wheels locked, appropriate side rails in place. Provide visual cue, wrist band, yellow gown, etc. Monitor gait and stability. Monitor for mental status changes and reorient to person, place, and time. Review medications for side effects contributing to fall risk. Reinforce activity limits and safety measures with patient and family. Provide visual clues: red socks.

## 2019-05-31 NOTE — ED ADULT TRIAGE NOTE - CHIEF COMPLAINT QUOTE
Pt with B/L LE swelling.  stopped Eliquis 1 week ago for IVC filter.  RLE swelling after stopping BT.  had doppler today and has DVT in R leg.  denies CP/SOB.  EKG in progress.

## 2019-06-01 DIAGNOSIS — R26.2 DIFFICULTY IN WALKING, NOT ELSEWHERE CLASSIFIED: ICD-10-CM

## 2019-06-01 DIAGNOSIS — D50.9 IRON DEFICIENCY ANEMIA, UNSPECIFIED: ICD-10-CM

## 2019-06-01 DIAGNOSIS — R60.0 LOCALIZED EDEMA: ICD-10-CM

## 2019-06-01 DIAGNOSIS — Z95.828 PRESENCE OF OTHER VASCULAR IMPLANTS AND GRAFTS: Chronic | ICD-10-CM

## 2019-06-01 DIAGNOSIS — D68.59 OTHER PRIMARY THROMBOPHILIA: ICD-10-CM

## 2019-06-01 DIAGNOSIS — C55 MALIGNANT NEOPLASM OF UTERUS, PART UNSPECIFIED: ICD-10-CM

## 2019-06-01 DIAGNOSIS — E87.5 HYPERKALEMIA: ICD-10-CM

## 2019-06-01 DIAGNOSIS — Z98.890 OTHER SPECIFIED POSTPROCEDURAL STATES: Chronic | ICD-10-CM

## 2019-06-01 DIAGNOSIS — I10 ESSENTIAL (PRIMARY) HYPERTENSION: ICD-10-CM

## 2019-06-01 DIAGNOSIS — N93.9 ABNORMAL UTERINE AND VAGINAL BLEEDING, UNSPECIFIED: ICD-10-CM

## 2019-06-01 DIAGNOSIS — I82.409 ACUTE EMBOLISM AND THROMBOSIS OF UNSPECIFIED DEEP VEINS OF UNSPECIFIED LOWER EXTREMITY: ICD-10-CM

## 2019-06-01 DIAGNOSIS — D72.829 ELEVATED WHITE BLOOD CELL COUNT, UNSPECIFIED: ICD-10-CM

## 2019-06-01 LAB
24R-OH-CALCIDIOL SERPL-MCNC: 62.3 NG/ML — SIGNIFICANT CHANGE UP (ref 30–80)
ALBUMIN SERPL ELPH-MCNC: 2.3 G/DL — LOW (ref 3.3–5)
ALBUMIN SERPL ELPH-MCNC: 2.8 G/DL — LOW (ref 3.3–5)
ALP SERPL-CCNC: 177 U/L — HIGH (ref 40–120)
ALP SERPL-CCNC: 198 U/L — HIGH (ref 40–120)
ALT FLD-CCNC: 6 U/L — SIGNIFICANT CHANGE UP (ref 4–33)
ALT FLD-CCNC: 9 U/L — SIGNIFICANT CHANGE UP (ref 4–33)
ANION GAP SERPL CALC-SCNC: 14 MMO/L — SIGNIFICANT CHANGE UP (ref 7–14)
ANION GAP SERPL CALC-SCNC: 15 MMO/L — HIGH (ref 7–14)
APPEARANCE UR: SIGNIFICANT CHANGE UP
AST SERPL-CCNC: 13 U/L — SIGNIFICANT CHANGE UP (ref 4–32)
AST SERPL-CCNC: 14 U/L — SIGNIFICANT CHANGE UP (ref 4–32)
BACTERIA # UR AUTO: SIGNIFICANT CHANGE UP
BASOPHILS # BLD AUTO: 0.03 K/UL — SIGNIFICANT CHANGE UP (ref 0–0.2)
BASOPHILS NFR BLD AUTO: 0.2 % — SIGNIFICANT CHANGE UP (ref 0–2)
BILIRUB SERPL-MCNC: 0.2 MG/DL — SIGNIFICANT CHANGE UP (ref 0.2–1.2)
BILIRUB SERPL-MCNC: 0.2 MG/DL — SIGNIFICANT CHANGE UP (ref 0.2–1.2)
BILIRUB UR-MCNC: NEGATIVE — SIGNIFICANT CHANGE UP
BLOOD UR QL VISUAL: HIGH
BUN SERPL-MCNC: 36 MG/DL — HIGH (ref 7–23)
BUN SERPL-MCNC: 39 MG/DL — HIGH (ref 7–23)
CALCIUM SERPL-MCNC: 9.1 MG/DL — SIGNIFICANT CHANGE UP (ref 8.4–10.5)
CALCIUM SERPL-MCNC: 9.3 MG/DL — SIGNIFICANT CHANGE UP (ref 8.4–10.5)
CHLORIDE SERPL-SCNC: 100 MMOL/L — SIGNIFICANT CHANGE UP (ref 98–107)
CHLORIDE SERPL-SCNC: 99 MMOL/L — SIGNIFICANT CHANGE UP (ref 98–107)
CO2 SERPL-SCNC: 19 MMOL/L — LOW (ref 22–31)
CO2 SERPL-SCNC: 20 MMOL/L — LOW (ref 22–31)
COLOR SPEC: YELLOW — SIGNIFICANT CHANGE UP
CREAT SERPL-MCNC: 1.68 MG/DL — HIGH (ref 0.5–1.3)
CREAT SERPL-MCNC: 1.9 MG/DL — HIGH (ref 0.5–1.3)
EOSINOPHIL # BLD AUTO: 0.01 K/UL — SIGNIFICANT CHANGE UP (ref 0–0.5)
EOSINOPHIL NFR BLD AUTO: 0.1 % — SIGNIFICANT CHANGE UP (ref 0–6)
EPI CELLS # UR: SIGNIFICANT CHANGE UP
GLUCOSE SERPL-MCNC: 120 MG/DL — HIGH (ref 70–99)
GLUCOSE SERPL-MCNC: 159 MG/DL — HIGH (ref 70–99)
GLUCOSE UR-MCNC: SIGNIFICANT CHANGE UP
HBA1C BLD-MCNC: 6 % — HIGH (ref 4–5.6)
HCT VFR BLD CALC: 27.4 % — LOW (ref 34.5–45)
HGB BLD-MCNC: 8.2 G/DL — LOW (ref 11.5–15.5)
IMM GRANULOCYTES NFR BLD AUTO: 1.3 % — SIGNIFICANT CHANGE UP (ref 0–1.5)
KETONES UR-MCNC: NEGATIVE — SIGNIFICANT CHANGE UP
LDH SERPL L TO P-CCNC: 884 U/L — HIGH (ref 135–225)
LEUKOCYTE ESTERASE UR-ACNC: SIGNIFICANT CHANGE UP
LYMPHOCYTES # BLD AUTO: 1.49 K/UL — SIGNIFICANT CHANGE UP (ref 1–3.3)
LYMPHOCYTES # BLD AUTO: 7.5 % — LOW (ref 13–44)
MAGNESIUM SERPL-MCNC: 1.9 MG/DL — SIGNIFICANT CHANGE UP (ref 1.6–2.6)
MCHC RBC-ENTMCNC: 22.5 PG — LOW (ref 27–34)
MCHC RBC-ENTMCNC: 29.9 % — LOW (ref 32–36)
MCV RBC AUTO: 75.3 FL — LOW (ref 80–100)
MONOCYTES # BLD AUTO: 1.2 K/UL — HIGH (ref 0–0.9)
MONOCYTES NFR BLD AUTO: 6 % — SIGNIFICANT CHANGE UP (ref 2–14)
NEUTROPHILS # BLD AUTO: 16.92 K/UL — HIGH (ref 1.8–7.4)
NEUTROPHILS NFR BLD AUTO: 84.9 % — HIGH (ref 43–77)
NITRITE UR-MCNC: NEGATIVE — SIGNIFICANT CHANGE UP
NRBC # FLD: 0 K/UL — SIGNIFICANT CHANGE UP (ref 0–0)
PH UR: 5.5 — SIGNIFICANT CHANGE UP (ref 5–8)
PHOSPHATE SERPL-MCNC: 3 MG/DL — SIGNIFICANT CHANGE UP (ref 2.5–4.5)
PLATELET # BLD AUTO: 366 K/UL — SIGNIFICANT CHANGE UP (ref 150–400)
PMV BLD: 9.9 FL — SIGNIFICANT CHANGE UP (ref 7–13)
POTASSIUM SERPL-MCNC: 5.1 MMOL/L — SIGNIFICANT CHANGE UP (ref 3.5–5.3)
POTASSIUM SERPL-MCNC: 5.6 MMOL/L — HIGH (ref 3.5–5.3)
POTASSIUM SERPL-SCNC: 5.1 MMOL/L — SIGNIFICANT CHANGE UP (ref 3.5–5.3)
POTASSIUM SERPL-SCNC: 5.6 MMOL/L — HIGH (ref 3.5–5.3)
PROT SERPL-MCNC: 6.3 G/DL — SIGNIFICANT CHANGE UP (ref 6–8.3)
PROT SERPL-MCNC: 7 G/DL — SIGNIFICANT CHANGE UP (ref 6–8.3)
PROT UR-MCNC: SIGNIFICANT CHANGE UP
RBC # BLD: 3.64 M/UL — LOW (ref 3.8–5.2)
RBC # FLD: 16.9 % — HIGH (ref 10.3–14.5)
RBC CASTS # UR COMP ASSIST: HIGH (ref 0–?)
SODIUM SERPL-SCNC: 133 MMOL/L — LOW (ref 135–145)
SODIUM SERPL-SCNC: 134 MMOL/L — LOW (ref 135–145)
SP GR SPEC: 1.02 — SIGNIFICANT CHANGE UP (ref 1–1.04)
TSH SERPL-MCNC: 6.81 UIU/ML — HIGH (ref 0.27–4.2)
URATE SERPL-MCNC: 8.6 MG/DL — HIGH (ref 2.5–7)
UROBILINOGEN FLD QL: NORMAL — SIGNIFICANT CHANGE UP
WBC # BLD: 19.9 K/UL — HIGH (ref 3.8–10.5)
WBC # FLD AUTO: 19.9 K/UL — HIGH (ref 3.8–10.5)
WBC UR QL: >50 — HIGH (ref 0–?)

## 2019-06-01 PROCEDURE — 99223 1ST HOSP IP/OBS HIGH 75: CPT | Mod: GC

## 2019-06-01 PROCEDURE — 12345: CPT | Mod: NC

## 2019-06-01 PROCEDURE — 99223 1ST HOSP IP/OBS HIGH 75: CPT

## 2019-06-01 PROCEDURE — 71045 X-RAY EXAM CHEST 1 VIEW: CPT | Mod: 26

## 2019-06-01 PROCEDURE — ZZZZZ: CPT

## 2019-06-01 RX ORDER — CEFTRIAXONE 500 MG/1
1 INJECTION, POWDER, FOR SOLUTION INTRAMUSCULAR; INTRAVENOUS ONCE
Refills: 0 | Status: COMPLETED | OUTPATIENT
Start: 2019-06-01 | End: 2019-06-01

## 2019-06-01 RX ORDER — CEFTRIAXONE 500 MG/1
INJECTION, POWDER, FOR SOLUTION INTRAMUSCULAR; INTRAVENOUS
Refills: 0 | Status: DISCONTINUED | OUTPATIENT
Start: 2019-06-01 | End: 2019-06-04

## 2019-06-01 RX ORDER — LOSARTAN POTASSIUM 100 MG/1
100 TABLET, FILM COATED ORAL DAILY
Refills: 0 | Status: DISCONTINUED | OUTPATIENT
Start: 2019-06-01 | End: 2019-06-01

## 2019-06-01 RX ORDER — INSULIN HUMAN 100 [IU]/ML
5 INJECTION, SOLUTION SUBCUTANEOUS ONCE
Refills: 0 | Status: COMPLETED | OUTPATIENT
Start: 2019-06-01 | End: 2019-06-01

## 2019-06-01 RX ORDER — SODIUM CHLORIDE 9 MG/ML
1000 INJECTION INTRAMUSCULAR; INTRAVENOUS; SUBCUTANEOUS
Refills: 0 | Status: COMPLETED | OUTPATIENT
Start: 2019-06-01 | End: 2019-06-01

## 2019-06-01 RX ORDER — HEPARIN SODIUM 5000 [USP'U]/ML
5000 INJECTION INTRAVENOUS; SUBCUTANEOUS EVERY 12 HOURS
Refills: 0 | Status: DISCONTINUED | OUTPATIENT
Start: 2019-06-01 | End: 2019-06-04

## 2019-06-01 RX ORDER — FERROUS SULFATE 325(65) MG
325 TABLET ORAL
Refills: 0 | Status: DISCONTINUED | OUTPATIENT
Start: 2019-06-01 | End: 2019-06-04

## 2019-06-01 RX ORDER — SODIUM CHLORIDE 9 MG/ML
500 INJECTION, SOLUTION INTRAVENOUS ONCE
Refills: 0 | Status: COMPLETED | OUTPATIENT
Start: 2019-06-01 | End: 2019-06-01

## 2019-06-01 RX ORDER — AMLODIPINE BESYLATE 2.5 MG/1
5 TABLET ORAL DAILY
Refills: 0 | Status: DISCONTINUED | OUTPATIENT
Start: 2019-06-01 | End: 2019-06-04

## 2019-06-01 RX ORDER — CEFTRIAXONE 500 MG/1
1 INJECTION, POWDER, FOR SOLUTION INTRAMUSCULAR; INTRAVENOUS EVERY 24 HOURS
Refills: 0 | Status: DISCONTINUED | OUTPATIENT
Start: 2019-06-02 | End: 2019-06-04

## 2019-06-01 RX ORDER — DEXTROSE 50 % IN WATER 50 %
50 SYRINGE (ML) INTRAVENOUS ONCE
Refills: 0 | Status: COMPLETED | OUTPATIENT
Start: 2019-06-01 | End: 2019-06-01

## 2019-06-01 RX ORDER — CEFTRIAXONE 500 MG/1
1 INJECTION, POWDER, FOR SOLUTION INTRAMUSCULAR; INTRAVENOUS EVERY 24 HOURS
Refills: 0 | Status: DISCONTINUED | OUTPATIENT
Start: 2019-06-01 | End: 2019-06-01

## 2019-06-01 RX ORDER — SODIUM CHLORIDE 9 MG/ML
1000 INJECTION INTRAMUSCULAR; INTRAVENOUS; SUBCUTANEOUS ONCE
Refills: 0 | Status: COMPLETED | OUTPATIENT
Start: 2019-06-01 | End: 2019-06-01

## 2019-06-01 RX ORDER — ALBUTEROL 90 UG/1
10 AEROSOL, METERED ORAL ONCE
Refills: 0 | Status: COMPLETED | OUTPATIENT
Start: 2019-06-01 | End: 2019-06-01

## 2019-06-01 RX ADMIN — INSULIN HUMAN 5 UNIT(S): 100 INJECTION, SOLUTION SUBCUTANEOUS at 02:10

## 2019-06-01 RX ADMIN — HEPARIN SODIUM 5000 UNIT(S): 5000 INJECTION INTRAVENOUS; SUBCUTANEOUS at 06:24

## 2019-06-01 RX ADMIN — Medication 325 MILLIGRAM(S): at 06:24

## 2019-06-01 RX ADMIN — Medication 50 MILLILITER(S): at 02:08

## 2019-06-01 RX ADMIN — CEFTRIAXONE 100 GRAM(S): 500 INJECTION, POWDER, FOR SOLUTION INTRAMUSCULAR; INTRAVENOUS at 14:16

## 2019-06-01 RX ADMIN — SODIUM CHLORIDE 50 MILLILITER(S): 9 INJECTION INTRAMUSCULAR; INTRAVENOUS; SUBCUTANEOUS at 07:23

## 2019-06-01 RX ADMIN — SODIUM CHLORIDE 1000 MILLILITER(S): 9 INJECTION INTRAMUSCULAR; INTRAVENOUS; SUBCUTANEOUS at 02:09

## 2019-06-01 RX ADMIN — AMLODIPINE BESYLATE 5 MILLIGRAM(S): 2.5 TABLET ORAL at 06:24

## 2019-06-01 RX ADMIN — ALBUTEROL 10 MILLIGRAM(S): 90 AEROSOL, METERED ORAL at 03:40

## 2019-06-01 RX ADMIN — HEPARIN SODIUM 5000 UNIT(S): 5000 INJECTION INTRAVENOUS; SUBCUTANEOUS at 17:47

## 2019-06-01 RX ADMIN — Medication 5 MILLIGRAM(S): at 21:58

## 2019-06-01 RX ADMIN — Medication 325 MILLIGRAM(S): at 17:47

## 2019-06-01 RX ADMIN — SODIUM CHLORIDE 1000 MILLILITER(S): 9 INJECTION, SOLUTION INTRAVENOUS at 06:25

## 2019-06-01 NOTE — H&P ADULT - HISTORY OF PRESENT ILLNESS
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  Seen and evaluated at bedside;    Vital signs upon ED presentation as follows: BP = 148/90, HR = 109, TT = 18, T = 37 C (98.7 F), O2 Sat = 100% on RA.  Diagnosed with DVT.  Noted with  hyperkalemia (5.6).  Prescribed regular insulin 5 U IVP, dextrose 50 mL IV and NS 1 liter in the ED. 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  Seen and evaluated at bedside; weak appearing, but in NAD.  Responds to questions after a few seconds, and usually after appearing to swallow or clear the throat.  Patient reports presenting to the ED due to "blood clots," of the right lower extremity.  Per reports, patient was diagnosed with DVT of the LLE in April 2019, and was on Eliquis.  Subsequently underwent IVC filter placement while Eliquis was held; no longer on Eliquis.  Patient reports being diagnosed with DVT on the day prior to coming to the ED.  Currently endorses weakness and fatigue.  Surmises weight loss of approximately 10 pounds over the past approximately one month, and comments that she sleeps too much.  Indicates that she sits for long periods due to weakness/fatigue.  Has not travelled for long distances recently.  No reports of fevers, chills, nausea, vomiting, chest pain, palpitations, constipation or diarrhea.  Indicates some burning with micturition.  Reports pain of the right abdomen and right hip; right hip pain most pronounced when lying in bed for any long length of time.      Vital signs upon ED presentation as follows: BP = 148/90, HR = 109, TT = 18, T = 37 C (98.7 F), O2 Sat = 100% on RA.  Diagnosed with DVT.  Noted with  hyperkalemia (5.6).  Prescribed regular insulin 5 U IVP, dextrose 50 mL IV and NS 1 liter in the ED. 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  Seen and evaluated at bedside; weak appearing, but in NAD.  Responds to questions after a few seconds, and usually after appearing to swallow or clear the throat.  Patient reports presenting to the ED due to "blood clots," of the right lower extremity.  Per reports, patient was diagnosed with DVT of the LLE in April 2019, and was on Eliquis.  However, patient has been suffering with vaginal bleeding (in the setting of need for anticoagulation) and underwent IVC filter placement while Eliquis was held; no longer on Eliquis.  Patient reports being diagnosed with DVT on the day prior to coming to the ED.  Currently endorses weakness and fatigue.  Surmises weight loss of approximately 10 pounds over the past approximately one month, and comments that she sleeps too much.  Indicates that she sits for long periods due to weakness/fatigue.  Has not travelled for long distances recently.  No reports of fevers, chills, nausea, vomiting, chest pain, palpitations, constipation or diarrhea.  Indicates some burning with micturition.  Reports pain of the right abdomen and right hip; right hip pain most pronounced when lying in bed for any long length of time.      Vital signs upon ED presentation as follows: BP = 148/90, HR = 109, TT = 18, T = 37 C (98.7 F), O2 Sat = 100% on RA.  Diagnosed with DVT.  Noted with  hyperkalemia (5.6).  Prescribed regular insulin 5 U IVP, dextrose 50 mL IV and NS 1 liter in the ED.

## 2019-06-01 NOTE — CONSULT NOTE ADULT - PROBLEM SELECTOR RECOMMENDATION 9
-Repeat BMP this AM to ensure resolution of hyperkalemia, improvement in Creatinine with IV hydration. Patient with known CKD  -DVT prophylaxis while in house, patient had IVC filter placed 5/23 and offers no pulmonary complaints at this time  -analgesia prn  -No acute GYN Oncology intervention at this time as patient has Stage IV B disease she is scheduled to see her medical oncologist on Wednesday 6/5 to start chemotherapy.     Will continue to follow    dw Dr. Nichelle Gaines PGY4

## 2019-06-01 NOTE — H&P ADULT - PROBLEM SELECTOR PLAN 4
- due to pain, chiefly of the B/L calf areas  - contributing toward frequent sedentary state  - recently acquired a walker to aid ambulation  - out of bed to chair  - ambulation with walker and physical therapy evaluation when optimal

## 2019-06-01 NOTE — H&P ADULT - NSHPLABSRESULTS_GEN_ALL_CORE
8.7    22.18 )-----------( 348      ( 31 May 2019 23:27 )             29.1       05-31    134<L>  |  99  |  39<H>  ----------------------------<  159<H>  5.6<H>   |  20<L>  |  1.90<H>    Ca    9.1      31 May 2019 23:27    TPro  7.0  /  Alb  2.8<L>  /  TBili  0.2  /  DBili  x   /  AST  14  /  ALT  6   /  AlkPhos  198<H>  05-31    PT/INR - ( 31 May 2019 23:27 )   PT: 13.4 SEC;   INR: 1.20          PTT - ( 31 May 2019 23:27 )  PTT:27.7 SEC    Lactate Trend    CAPILLARY BLOOD GLUCOSE      POCT Blood Glucose.: 220 mg/dL (01 Jun 2019 02:07)

## 2019-06-01 NOTE — CONSULT NOTE ADULT - ASSESSMENT
69 year old with likely Stage IVB Uterine Cancer with pulmonary mets admitted with RLE DVT and found to have hyperkalemia

## 2019-06-01 NOTE — H&P ADULT - PROBLEM SELECTOR PLAN 3
- pronounced, with pitting  - tenderness to palpation of both legs  - likely due to nutritional state (albumin = 2.8), and possibly due to renal loss (UA ordered)   - elevate B/L LE while sitting and in bed

## 2019-06-01 NOTE — H&P ADULT - PROBLEM SELECTOR PLAN 8
- hemoglobin currently 8.7  - iron studies performed on 04/30/2019 w/ iron = 15, Iron saturation = 8, Ferritin = 313, Iron, TIBC = 188  - anemia is likely multifactorial, however; due to iron deficiency, vaginal bleeding, chronic disease  - continuing on ferrous sulfate 325 mg BID  - no stool softener prescribed presently since patient states not difficulty with bowel movement, but would evaluate for adequate BMs periodically  - holding off on adding vitamin C to aid absorption (because of renal failure)

## 2019-06-01 NOTE — H&P ADULT - NSHPSOCIALHISTORY_GEN_ALL_CORE
Single  Lives alone  No history of smoking  No history of alcohol abuse  No history of illegal drug use    Has visiting nurse service  Has ILANA Single  Lives alone  No history of smoking  No history of alcohol abuse  No history of illegal drug use    Has visiting nurse service  Has Home health aide

## 2019-06-01 NOTE — H&P ADULT - PROBLEM SELECTOR PLAN 2
- potassium = 5.6 (non-hemolyzed) on admission  - in the setting of renal failure and CKD  - likely due to dehydration  - s/p regular insulin 5 units and Dextrose 50 mL  - f/u repeat level in the AM - potassium = 5.6 (non-hemolyzed) on admission  - in the setting of renal failure and CKD  - likely due to dehydration, but will hold Losartan for now  - s/p regular insulin 5 units and Dextrose 50 mL  - f/u repeat level in the AM

## 2019-06-01 NOTE — ED PROVIDER NOTE - ATTENDING CONTRIBUTION TO CARE
69 year old with uterine ca and dvt presents with new dvts.  patient unclear about whether she was supposed to restart eliquis which she had stopped for IVC filter.  patient seems weak and unable to ambulate well. will check labs, restart ac, admit for monitor in setting of possible vaginal bleeding and for pt eval

## 2019-06-01 NOTE — H&P ADULT - PROBLEM SELECTOR PLAN 1
- DVT initially of LLE in 04/2019, and now w/ RLE DVT (dx yesterday, per patient)  - was on Eliquis, but held for IVC filter placement on 05/23/2019  - reports being sedentary for long periods - due to weakness/fatigue; no recent travel  - needs anticoagulation, but currently contraindicated due to vaginal bleeding  - hematology consult in the AM  - prescribed heparin 5000 IU SQ Q12H for now (f/u hemoglobin level; currently 8.7)

## 2019-06-01 NOTE — CONSULT NOTE ADULT - SUBJECTIVE AND OBJECTIVE BOX
HPI:  69 year old female, with past history significant for Stage IVB uterine cancer w/ mets to the lungs, c/b chronic vaginal bleeding, CKD, HTN, DVT 4/2019 s/p IVC filter off of Eliquis due to vaginal bleeding, presented to the ED secondary to inability to ambulate due to leg pain.      Currently endorses weakness and fatigue, weight loss of approximately 10 pounds over the past approximately one month, and comments that she sleeps too much.  Indicates that she sits for long periods due to weakness/fatigue.  Has not travelled for long distances recently.  No reports of fevers, chills, nausea, vomiting, chest pain, palpitations, constipation or diarrhea.  Indicates some burning with micturition.  Reports pain of the right abdomen and right hip; right hip pain most pronounced when lying in bed for any long length of time.      Vital signs upon ED presentation as follows: BP = 148/90, HR = 109, TT = 18, T = 37 C (98.7 F), O2 Sat = 100% on RA.  Diagnosed with RLE DVT.  Noted with  hyperkalemia (5.6).  Prescribed regular insulin 5 U IVP, dextrose 50 mL IV and NS 1 liter in the ED.     Patient has an appointment to see Dr. Burns from [facility] 6/5 to start chemotherapy for her uterine cancer.  = 60.      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    Height (cm): 165.1 (06-01 @ 03:42)  Weight (kg): 50.6 (06-01 @ 03:42)  BMI (kg/m2): 18.6 (06-01 @ 03:42)  BSA (m2): 1.54 (06-01 @ 03:42)    T(F): 98.8 (06-01-19 @ 13:39), Max: 99.1 (06-01-19 @ 03:42)  HR: 96 (06-01-19 @ 13:39)  BP: 148/80 (06-01-19 @ 13:39)  RR: 18 (06-01-19 @ 13:39)  SpO2: 100% (06-01-19 @ 13:39)  Wt(kg): --    GENERAL: NAD, well-developed  HEAD:  Atraumatic, Normocephalic  EYES: EOMI, PERRLA, conjunctiva and sclera clear  NECK: Supple, No JVD  CHEST/LUNG: Clear to auscultation bilaterally; No wheeze  HEART: Regular rate and rhythm; No murmurs, rubs, or gallops  ABDOMEN: Soft, Nontender, Nondistended; Bowel sounds present  EXTREMITIES:  2+ Peripheral Pulses, No clubbing, cyanosis, or edema  NEUROLOGY: non-focal  SKIN: No rashes or lesions                          8.2    19.90 )-----------( 366      ( 01 Jun 2019 06:27 )             27.4       06-01    133<L>  |  100  |  36<H>  ----------------------------<  120<H>  5.1   |  19<L>  |  1.68<H>    Ca    9.3      01 Jun 2019 06:27  Phos  3.0     06-01  Mg     1.9     06-01    TPro  6.3  /  Alb  2.3<L>  /  TBili  0.2  /  DBili  x   /  AST  13  /  ALT  9   /  AlkPhos  177<H>  06-01      Phosphorus Level, Serum: 3.0 mg/dL (06-01 @ 06:27)  Magnesium, Serum: 1.9 mg/dL (06-01 @ 06:27)  Uric Acid, Serum: 8.6 mg/dL (06-01 @ 06:27)  Lactate Dehydrogenase, Serum: 884 U/L (06-01 @ 06:27)

## 2019-06-01 NOTE — CONSULT NOTE ADULT - ATTENDING COMMENTS
69 year old female, newly diagnosed Stage IVB uterine cancer w/ mets to the lungs, c/b chronic vaginal bleeding, s/p DVT 4/2019 with IVC filter placed. She is off Eliquis due to vaginal bleeding. Presented to the ED secondary to inability to ambulate due to leg pain and now found to have a new RLE DVT.  Although anticoagulation is preferred for DVT it is not an option at this time given vaginal bleeding.   She has been seen by an outside oncologist, Dr. Burns, and is scheduled to start chemotherapy with Carboplatin and Taxol on 6/5, and it is a priority for her to start her treatment. The chemo may help with the vaginal bleeding if the cancer is sensitive to this regimen. Palliative RT is another option if needed, but her oncologist will address this if it is appropriate.
Reviewed with AN last night. Seen with R2 this am. Will follow with the Med Svc. To f/u with Med Onc.

## 2019-06-01 NOTE — H&P ADULT - NSICDXPASTMEDICALHX_GEN_ALL_CORE_FT
PAST MEDICAL HISTORY:  Chronic kidney disease     Essential hypertension     Iron deficiency anemia PAST MEDICAL HISTORY:  Chronic kidney disease     Essential hypertension     Iron deficiency anemia     Leiomyosarcoma of uterus ~ Stage IV B    Pulmonary nodules ~ bilateral    Vaginal bleeding

## 2019-06-01 NOTE — H&P ADULT - MUSCULOSKELETAL COMMENTS
swelling/edema of B/L ankles, tenderness to moderate palpation of right hip and B/L calf areas R-hip pain

## 2019-06-01 NOTE — CONSULT NOTE ADULT - ASSESSMENT
69 year old female, with past history significant for Stage IVB uterine cancer w/ mets to the lungs, c/b chronic vaginal bleeding, CKD, HTN, DVT 4/2019 s/p IVC filter off of Eliquis due to vaginal bleeding, presented to the ED secondary to inability to ambulate due to leg pain found to have a new RLE DVT.  Oncology consulted for anticoagulation recs and treatment recs for vaginal bleeding from uterine cancer.    #New RLE DVT  -has an IVC filter and off Eliquis   -would not A/C at this time given vaginal bleeding  -if at any point after treatment if the vaginal bleeding stops, she should go on Lovenox for VTE related to malignancy    #Stage IV uterine cancer  -patient has an appointment to see Dr. Burns to start chemotherapy; if no contraindication to discharge, it would be best for her to make the outpatient appointment to start treatment  -vaginal bleeding is not worse and stable per patient; may later need palliative RT to help with bleeding if persistent after chemo    #Leukocytosis  -may be 2/2 to DVT and UTI  -on ceftriaxone  -monitor cbc daily    We will sign off at this time.  Please call us with any questions and reconsult prn.

## 2019-06-01 NOTE — H&P ADULT - PROBLEM SELECTOR PLAN 7
- abdomen firm and protuberant and w/ R-sided tenderness  - reportedly w/ Stage IV-B disease; has lung metastases  - with associated vaginal bleeding  - being followed by GYN (appreciated)  - patient reports being scheduled for starting chemotherapy next week  - oncology consult in the AM (please call)

## 2019-06-01 NOTE — ED PROVIDER NOTE - PHYSICAL EXAMINATION
Gen: Well appearing, NAD  Head: NCAT  HEENT: PERRL, MMM, normal conjunctiva, anicteric, neck supple  Lung: CTAB, no adventitious sounds  CV: RRR, no murmurs  Abd: soft, NTND, no rebound or guarding, no CVAT  MSK: 2+ edema, no visible deformities  Neuro: Moving all extermities grossly  Skin: Warm and dry, no evidence of rash  Psych: normal mood and affect

## 2019-06-01 NOTE — H&P ADULT - ASSESSMENT
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  Vital signs upon ED presentation as follows: BP = 148/90, HR = 109, TT = 18, T = 37 C (98.7 F), O2 Sat = 100% on RA.  Diagnosed with DVT.  Admitted for further management of:

## 2019-06-01 NOTE — H&P ADULT - PROBLEM SELECTOR PLAN 9
- no acute issues presently  - holding Losartan due to slightly worsened renal function and hyperkalemia  - prescribed amlodipine 5 mg QD in lieu of losartan  - f/u w/ repeat measurements and modify therapy, as needed

## 2019-06-01 NOTE — H&P ADULT - NSICDXPASTSURGICALHX_GEN_ALL_CORE_FT
PAST SURGICAL HISTORY:  History of lung biopsy PAST SURGICAL HISTORY:  History of lung biopsy ~ RLL (05/23/2019)    S/P insertion of IVC (inferior vena caval) filter ~ 05/21/2019

## 2019-06-01 NOTE — ED PROVIDER NOTE - CLINICAL SUMMARY MEDICAL DECISION MAKING FREE TEXT BOX
Hx new CA eliquis stopped but worsening burden of DVT. Labs for anemia, poss restart eliquis vs admit for further eval.

## 2019-06-01 NOTE — H&P ADULT - PROBLEM SELECTOR PLAN 6
- hemoglobin currently 8.7  - iron studies performed on 04/30/2019 w/ iron = 15, Iron saturation = 8, Ferritin = 313, Iron - ongoing issue, in the setting of leiomyosarcoma of the uterus  - being followed by GYN (appreciated)  - Hgb currently 8.7; f/u trend with repeat lab-work  - Oncology consult in the AM (please call)

## 2019-06-01 NOTE — H&P ADULT - PROBLEM SELECTOR PLAN 5
- WBC = 22.18 (repeat w/ differentials ordered)  - now reports some burning/itching with micturition  - has low grade temperature this AM (99.1)  - urinalysis ordered; will also order CXR - WBC = 22.18 (repeat w/ differentials ordered)  - now reports some burning/itching with micturition  - has low grade temperature this AM (99.1)  - urinalysis ordered; will also order CXR  - may need to start antibiotic even if studies not performed

## 2019-06-01 NOTE — CONSULT NOTE ADULT - SUBJECTIVE AND OBJECTIVE BOX
Patient is a 69 year old  with likely metastatic uterine cancer admitted with new RLE DVT. Patient began having PMB in February and was seen by GYN and found to have enlarged uterus. CT, TVUS, and MRI showed an enlarged uterus suspicious for leiomyoma vs. leiomyosarcoma. She was seen at [facility] and diagnosed with LLE DVT in April where she was started on eliquis and saw Dr. Montes De Oca in May for evaluation of uterine mass with suspicion for metastatic disease. She was then referred to IR for an IVC filer and lung mass biopsy which was performed on . She subsequently discontinued her eliquis. The biopsy resulted as malignant cells. Ms. Gasca was scheduled to see her Medical Oncologist this Wednesday to begin chemotherapy.     She began having new onset RLE edema with mild pain yesterday and was sent for a RLE duplex which showed a lower extremity DVT and was told to come to the ER at [facility]. Upon presentation the patient was found to be hyperkalemic and treated with regular insulin, D5, and albuterol. EKG was normal.     Currently the patient offers no complaints. Has LE edema, but pain is controlled and she is able to ambulate. Pelvic pain has been controlled and bleeding has been minimal. Denies CP/SOB, fevers, chills. Able to ambulate.       OB/GYN HISTORY:   [place]: 2  Parity:2  Livin    Last Menstrual Period    Date of Last Mammogram:  nl  Date of Last Colonoscopy: ~  nl  Current OCP use: No     PAST MEDICAL & SURGICAL HISTORY:  Chronic kidney disease  Essential hypertension  No significant past surgical history      REVIEW OF SYSTEMS      MEDICATIONS  (STANDING):    MEDICATIONS  (PRN):      Allergies    No Known Allergies    Intolerances        SOCIAL HISTORY:    FAMILY HISTORY:  No pertinent family history in first degree relatives      Vital Signs Last 24 Hrs  T(C): 37.3 (2019 03:42), Max: 37.3 (2019 03:42)  T(F): 99.1 (2019 03:42), Max: 99.1 (2019 03:42)  HR: 99 (2019 03:42) (93 - 109)  BP: 143/68 (2019 03:42) (131/87 - 148/90)  BP(mean): --  RR: 18 (2019 03:42) (18 - 18)  SpO2: 100% (2019 03:42) (96% - 100%)    PHYSICAL EXAM:      Constitutional: alert and oriented x 3    Breasts: no tenderness, no nodules    Respiratory: clear    Cardiovascular: regular rate and rhythm    Gastrointestinal: soft, non tender, + bowel sounds. Enlarged ~ 20 cm uterus    Pelvic: Deferred, minimal spotting on pad            LABS:                        8.7    22.18 )-----------( 348      ( 31 May 2019 23:27 )             29.1         134<L>  |  99  |  39<H>  ----------------------------<  159<H>  5.6<H>   |  20<L>  |  1.90<H>    Ca    9.1      31 May 2019 23:27    TPro  7.0  /  Alb  2.8<L>  /  TBili  0.2  /  DBili  x   /  AST  14  /  ALT  6   /  AlkPhos  198<H>      PT/INR - ( 31 May 2019 23:27 )   PT: 13.4 SEC;   INR: 1.20          PTT - ( 31 May 2019 23:27 )  PTT:27.7 SEC      RADIOLOGY & ADDITIONAL STUDIES: Patient is a 69 year old  with likely metastatic uterine cancer admitted with new RLE DVT. Patient began having PMB in February and was seen by GYN and found to have enlarged uterus. CT, TVUS, and MRI showed an enlarged uterus suspicious for leiomyoma vs. leiomyosarcoma. She was seen at [facility] and diagnosed with LLE DVT in April where she was started on eliquis and saw Dr. Montes De Oca in May for evaluation of uterine mass with suspicion for metastatic disease. She was then referred to IR for an IVC filer and lung mass biopsy which was performed on . She subsequently discontinued her eliquis. The biopsy resulted as malignant cells. Ms. Gasca was scheduled to see her Medical Oncologist this Wednesday to begin chemotherapy.     She began having new onset RLE edema with mild pain yesterday and was sent for a RLE duplex which showed a lower extremity DVT and was told to come to the ER at [facility]. Upon presentation the patient was found to be hyperkalemic with normal EKG and treated with regular insulin, D5, and albuterol. EKG was normal.     Currently the patient offers no complaints. Has LE edema, but pain is controlled and she is able to ambulate. Pelvic pain has been controlled and bleeding has been minimal. Denies CP/SOB, fevers, chills. Able to ambulate.       OB/GYN HISTORY:   [place]: 2  Parity:2  Livin    Last Menstrual Period    Date of Last Mammogram:  nl  Date of Last Colonoscopy: ~  nl  Current OCP use: No     PAST MEDICAL & SURGICAL HISTORY:  Chronic kidney disease  Essential hypertension  No significant past surgical history      REVIEW OF SYSTEMS      MEDICATIONS  (STANDING):    MEDICATIONS  (PRN):      Allergies    No Known Allergies    Intolerances        SOCIAL HISTORY:    FAMILY HISTORY:  No pertinent family history in first degree relatives      Vital Signs Last 24 Hrs  T(C): 37.3 (2019 03:42), Max: 37.3 (2019 03:42)  T(F): 99.1 (2019 03:42), Max: 99.1 (2019 03:42)  HR: 99 (2019 03:42) (93 - 109)  BP: 143/68 (2019 03:42) (131/87 - 148/90)  BP(mean): --  RR: 18 (2019 03:42) (18 - 18)  SpO2: 100% (2019 03:42) (96% - 100%)    PHYSICAL EXAM:      Constitutional: alert and oriented x 3    Breasts: no tenderness, no nodules    Respiratory: clear    Cardiovascular: regular rate and rhythm    Gastrointestinal: soft, non tender, + bowel sounds. Enlarged ~ 20 cm uterus    Pelvic: Deferred, minimal spotting on pad            LABS:                        8.7    22.18 )-----------( 348      ( 31 May 2019 23:27 )             29.1         134<L>  |  99  |  39<H>  ----------------------------<  159<H>  5.6<H>   |  20<L>  |  1.90<H>    Ca    9.1      31 May 2019 23:27    TPro  7.0  /  Alb  2.8<L>  /  TBili  0.2  /  DBili  x   /  AST  14  /  ALT  6   /  AlkPhos  198<H>      PT/INR - ( 31 May 2019 23:27 )   PT: 13.4 SEC;   INR: 1.20          PTT - ( 31 May 2019 23:27 )  PTT:27.7 SEC      RADIOLOGY & ADDITIONAL STUDIES:

## 2019-06-02 LAB
ANION GAP SERPL CALC-SCNC: 14 MMO/L — SIGNIFICANT CHANGE UP (ref 7–14)
BACTERIA UR CULT: SIGNIFICANT CHANGE UP
BASOPHILS # BLD AUTO: 0.03 K/UL — SIGNIFICANT CHANGE UP (ref 0–0.2)
BASOPHILS NFR BLD AUTO: 0.2 % — SIGNIFICANT CHANGE UP (ref 0–2)
BLD GP AB SCN SERPL QL: NEGATIVE — SIGNIFICANT CHANGE UP
BUN SERPL-MCNC: 26 MG/DL — HIGH (ref 7–23)
CALCIUM SERPL-MCNC: 8.9 MG/DL — SIGNIFICANT CHANGE UP (ref 8.4–10.5)
CHLORIDE SERPL-SCNC: 101 MMOL/L — SIGNIFICANT CHANGE UP (ref 98–107)
CO2 SERPL-SCNC: 20 MMOL/L — LOW (ref 22–31)
CREAT SERPL-MCNC: 1.46 MG/DL — HIGH (ref 0.5–1.3)
EOSINOPHIL # BLD AUTO: 0 K/UL — SIGNIFICANT CHANGE UP (ref 0–0.5)
EOSINOPHIL NFR BLD AUTO: 0 % — SIGNIFICANT CHANGE UP (ref 0–6)
GLUCOSE SERPL-MCNC: 129 MG/DL — HIGH (ref 70–99)
HCT VFR BLD CALC: 24.9 % — LOW (ref 34.5–45)
HCT VFR BLD CALC: 28.1 % — LOW (ref 34.5–45)
HGB BLD-MCNC: 7.5 G/DL — LOW (ref 11.5–15.5)
HGB BLD-MCNC: 8.5 G/DL — LOW (ref 11.5–15.5)
IMM GRANULOCYTES NFR BLD AUTO: 1.2 % — SIGNIFICANT CHANGE UP (ref 0–1.5)
LYMPHOCYTES # BLD AUTO: 1.33 K/UL — SIGNIFICANT CHANGE UP (ref 1–3.3)
LYMPHOCYTES # BLD AUTO: 7 % — LOW (ref 13–44)
MAGNESIUM SERPL-MCNC: 1.7 MG/DL — SIGNIFICANT CHANGE UP (ref 1.6–2.6)
MCHC RBC-ENTMCNC: 22.6 PG — LOW (ref 27–34)
MCHC RBC-ENTMCNC: 23 PG — LOW (ref 27–34)
MCHC RBC-ENTMCNC: 30.1 % — LOW (ref 32–36)
MCHC RBC-ENTMCNC: 30.2 % — LOW (ref 32–36)
MCV RBC AUTO: 75 FL — LOW (ref 80–100)
MCV RBC AUTO: 76.2 FL — LOW (ref 80–100)
MONOCYTES # BLD AUTO: 1.33 K/UL — HIGH (ref 0–0.9)
MONOCYTES NFR BLD AUTO: 7 % — SIGNIFICANT CHANGE UP (ref 2–14)
NEUTROPHILS # BLD AUTO: 16.01 K/UL — HIGH (ref 1.8–7.4)
NEUTROPHILS NFR BLD AUTO: 84.6 % — HIGH (ref 43–77)
NRBC # FLD: 0 K/UL — SIGNIFICANT CHANGE UP (ref 0–0)
NRBC # FLD: 0 K/UL — SIGNIFICANT CHANGE UP (ref 0–0)
PHOSPHATE SERPL-MCNC: 2.6 MG/DL — SIGNIFICANT CHANGE UP (ref 2.5–4.5)
PLATELET # BLD AUTO: 313 K/UL — SIGNIFICANT CHANGE UP (ref 150–400)
PLATELET # BLD AUTO: 361 K/UL — SIGNIFICANT CHANGE UP (ref 150–400)
PMV BLD: 9.1 FL — SIGNIFICANT CHANGE UP (ref 7–13)
PMV BLD: 9.6 FL — SIGNIFICANT CHANGE UP (ref 7–13)
POTASSIUM SERPL-MCNC: 4.8 MMOL/L — SIGNIFICANT CHANGE UP (ref 3.5–5.3)
POTASSIUM SERPL-SCNC: 4.8 MMOL/L — SIGNIFICANT CHANGE UP (ref 3.5–5.3)
RBC # BLD: 3.32 M/UL — LOW (ref 3.8–5.2)
RBC # BLD: 3.69 M/UL — LOW (ref 3.8–5.2)
RBC # FLD: 17 % — HIGH (ref 10.3–14.5)
RBC # FLD: 17.1 % — HIGH (ref 10.3–14.5)
RH IG SCN BLD-IMP: POSITIVE — SIGNIFICANT CHANGE UP
SODIUM SERPL-SCNC: 135 MMOL/L — SIGNIFICANT CHANGE UP (ref 135–145)
SPECIMEN SOURCE: SIGNIFICANT CHANGE UP
WBC # BLD: 18.93 K/UL — HIGH (ref 3.8–10.5)
WBC # BLD: 19.23 K/UL — HIGH (ref 3.8–10.5)
WBC # FLD AUTO: 18.93 K/UL — HIGH (ref 3.8–10.5)
WBC # FLD AUTO: 19.23 K/UL — HIGH (ref 3.8–10.5)

## 2019-06-02 PROCEDURE — 99233 SBSQ HOSP IP/OBS HIGH 50: CPT

## 2019-06-02 RX ADMIN — Medication 5 MILLIGRAM(S): at 22:16

## 2019-06-02 RX ADMIN — Medication 325 MILLIGRAM(S): at 05:16

## 2019-06-02 RX ADMIN — HEPARIN SODIUM 5000 UNIT(S): 5000 INJECTION INTRAVENOUS; SUBCUTANEOUS at 05:16

## 2019-06-02 RX ADMIN — Medication 325 MILLIGRAM(S): at 18:33

## 2019-06-02 RX ADMIN — AMLODIPINE BESYLATE 5 MILLIGRAM(S): 2.5 TABLET ORAL at 05:16

## 2019-06-02 RX ADMIN — HEPARIN SODIUM 5000 UNIT(S): 5000 INJECTION INTRAVENOUS; SUBCUTANEOUS at 18:33

## 2019-06-02 RX ADMIN — CEFTRIAXONE 100 GRAM(S): 500 INJECTION, POWDER, FOR SOLUTION INTRAMUSCULAR; INTRAVENOUS at 14:42

## 2019-06-02 NOTE — PROGRESS NOTE ADULT - PROBLEM SELECTOR PLAN 1
- DVT initially of LLE in 04/2019 5/31 duplex Acute deep venous thrombosis: above the knee on the left and and below the knee right.   - was on Eliquis, but held for IVC filter placement on 05/23/2019  - reports being sedentary for long periods - due to weakness/fatigue; no recent travel  - needs anticoagulation, but currently contraindicated due to vaginal bleeding

## 2019-06-03 LAB
ANION GAP SERPL CALC-SCNC: 13 MMO/L — SIGNIFICANT CHANGE UP (ref 7–14)
BASOPHILS # BLD AUTO: 0.03 K/UL — SIGNIFICANT CHANGE UP (ref 0–0.2)
BASOPHILS NFR BLD AUTO: 0.2 % — SIGNIFICANT CHANGE UP (ref 0–2)
BUN SERPL-MCNC: 22 MG/DL — SIGNIFICANT CHANGE UP (ref 7–23)
CALCIUM SERPL-MCNC: 9.5 MG/DL — SIGNIFICANT CHANGE UP (ref 8.4–10.5)
CHLORIDE SERPL-SCNC: 101 MMOL/L — SIGNIFICANT CHANGE UP (ref 98–107)
CO2 SERPL-SCNC: 21 MMOL/L — LOW (ref 22–31)
CREAT SERPL-MCNC: 1.45 MG/DL — HIGH (ref 0.5–1.3)
EOSINOPHIL # BLD AUTO: 0 K/UL — SIGNIFICANT CHANGE UP (ref 0–0.5)
EOSINOPHIL NFR BLD AUTO: 0 % — SIGNIFICANT CHANGE UP (ref 0–6)
GLUCOSE SERPL-MCNC: 117 MG/DL — HIGH (ref 70–99)
HCT VFR BLD CALC: 26.3 % — LOW (ref 34.5–45)
HGB BLD-MCNC: 8 G/DL — LOW (ref 11.5–15.5)
IMM GRANULOCYTES NFR BLD AUTO: 1.1 % — SIGNIFICANT CHANGE UP (ref 0–1.5)
LYMPHOCYTES # BLD AUTO: 1.29 K/UL — SIGNIFICANT CHANGE UP (ref 1–3.3)
LYMPHOCYTES # BLD AUTO: 7 % — LOW (ref 13–44)
MAGNESIUM SERPL-MCNC: 1.7 MG/DL — SIGNIFICANT CHANGE UP (ref 1.6–2.6)
MCHC RBC-ENTMCNC: 22.8 PG — LOW (ref 27–34)
MCHC RBC-ENTMCNC: 30.4 % — LOW (ref 32–36)
MCV RBC AUTO: 74.9 FL — LOW (ref 80–100)
MONOCYTES # BLD AUTO: 1.1 K/UL — HIGH (ref 0–0.9)
MONOCYTES NFR BLD AUTO: 6 % — SIGNIFICANT CHANGE UP (ref 2–14)
NEUTROPHILS # BLD AUTO: 15.71 K/UL — HIGH (ref 1.8–7.4)
NEUTROPHILS NFR BLD AUTO: 85.7 % — HIGH (ref 43–77)
NRBC # FLD: 0 K/UL — SIGNIFICANT CHANGE UP (ref 0–0)
PHOSPHATE SERPL-MCNC: 2.8 MG/DL — SIGNIFICANT CHANGE UP (ref 2.5–4.5)
PLATELET # BLD AUTO: 334 K/UL — SIGNIFICANT CHANGE UP (ref 150–400)
PMV BLD: 9.4 FL — SIGNIFICANT CHANGE UP (ref 7–13)
POTASSIUM SERPL-MCNC: 5.2 MMOL/L — SIGNIFICANT CHANGE UP (ref 3.5–5.3)
POTASSIUM SERPL-SCNC: 5.2 MMOL/L — SIGNIFICANT CHANGE UP (ref 3.5–5.3)
RBC # BLD: 3.51 M/UL — LOW (ref 3.8–5.2)
RBC # FLD: 16.9 % — HIGH (ref 10.3–14.5)
SODIUM SERPL-SCNC: 135 MMOL/L — SIGNIFICANT CHANGE UP (ref 135–145)
T3 SERPL-MCNC: 70.9 NG/DL — LOW (ref 80–200)
T4 AB SER-ACNC: 6.03 UG/DL — SIGNIFICANT CHANGE UP (ref 5.1–13)
WBC # BLD: 18.34 K/UL — HIGH (ref 3.8–10.5)
WBC # FLD AUTO: 18.34 K/UL — HIGH (ref 3.8–10.5)

## 2019-06-03 PROCEDURE — 99233 SBSQ HOSP IP/OBS HIGH 50: CPT

## 2019-06-03 RX ADMIN — Medication 325 MILLIGRAM(S): at 17:30

## 2019-06-03 RX ADMIN — AMLODIPINE BESYLATE 5 MILLIGRAM(S): 2.5 TABLET ORAL at 05:52

## 2019-06-03 RX ADMIN — CEFTRIAXONE 100 GRAM(S): 500 INJECTION, POWDER, FOR SOLUTION INTRAMUSCULAR; INTRAVENOUS at 14:37

## 2019-06-03 RX ADMIN — HEPARIN SODIUM 5000 UNIT(S): 5000 INJECTION INTRAVENOUS; SUBCUTANEOUS at 17:30

## 2019-06-03 RX ADMIN — HEPARIN SODIUM 5000 UNIT(S): 5000 INJECTION INTRAVENOUS; SUBCUTANEOUS at 05:52

## 2019-06-03 RX ADMIN — Medication 5 MILLIGRAM(S): at 21:17

## 2019-06-03 RX ADMIN — Medication 325 MILLIGRAM(S): at 05:52

## 2019-06-04 ENCOUNTER — TRANSCRIPTION ENCOUNTER (OUTPATIENT)
Age: 69
End: 2019-06-04

## 2019-06-04 ENCOUNTER — CHART COPY (OUTPATIENT)
Age: 69
End: 2019-06-04

## 2019-06-04 VITALS
RESPIRATION RATE: 17 BRPM | OXYGEN SATURATION: 100 % | HEART RATE: 93 BPM | TEMPERATURE: 98 F | SYSTOLIC BLOOD PRESSURE: 115 MMHG | DIASTOLIC BLOOD PRESSURE: 68 MMHG

## 2019-06-04 DIAGNOSIS — N17.9 ACUTE KIDNEY FAILURE, UNSPECIFIED: ICD-10-CM

## 2019-06-04 LAB
ANION GAP SERPL CALC-SCNC: 13 MMO/L — SIGNIFICANT CHANGE UP (ref 7–14)
BASOPHILS # BLD AUTO: 0.03 K/UL — SIGNIFICANT CHANGE UP (ref 0–0.2)
BASOPHILS NFR BLD AUTO: 0.2 % — SIGNIFICANT CHANGE UP (ref 0–2)
BLD GP AB SCN SERPL QL: NEGATIVE — SIGNIFICANT CHANGE UP
BUN SERPL-MCNC: 18 MG/DL — SIGNIFICANT CHANGE UP (ref 7–23)
CALCIUM SERPL-MCNC: 8.8 MG/DL — SIGNIFICANT CHANGE UP (ref 8.4–10.5)
CHLORIDE SERPL-SCNC: 99 MMOL/L — SIGNIFICANT CHANGE UP (ref 98–107)
CO2 SERPL-SCNC: 21 MMOL/L — LOW (ref 22–31)
CREAT SERPL-MCNC: 1.3 MG/DL — SIGNIFICANT CHANGE UP (ref 0.5–1.3)
EOSINOPHIL # BLD AUTO: 0.01 K/UL — SIGNIFICANT CHANGE UP (ref 0–0.5)
EOSINOPHIL NFR BLD AUTO: 0.1 % — SIGNIFICANT CHANGE UP (ref 0–6)
GLUCOSE SERPL-MCNC: 125 MG/DL — HIGH (ref 70–99)
HCT VFR BLD CALC: 26 % — LOW (ref 34.5–45)
HCT VFR BLD CALC: 27.3 % — LOW (ref 34.5–45)
HGB BLD-MCNC: 7.8 G/DL — LOW (ref 11.5–15.5)
HGB BLD-MCNC: 8.1 G/DL — LOW (ref 11.5–15.5)
IMM GRANULOCYTES NFR BLD AUTO: 1.7 % — HIGH (ref 0–1.5)
LYMPHOCYTES # BLD AUTO: 1.01 K/UL — SIGNIFICANT CHANGE UP (ref 1–3.3)
LYMPHOCYTES # BLD AUTO: 5.4 % — LOW (ref 13–44)
MAGNESIUM SERPL-MCNC: 1.6 MG/DL — SIGNIFICANT CHANGE UP (ref 1.6–2.6)
MCHC RBC-ENTMCNC: 22.2 PG — LOW (ref 27–34)
MCHC RBC-ENTMCNC: 22.6 PG — LOW (ref 27–34)
MCHC RBC-ENTMCNC: 29.7 % — LOW (ref 32–36)
MCHC RBC-ENTMCNC: 30 % — LOW (ref 32–36)
MCV RBC AUTO: 74.1 FL — LOW (ref 80–100)
MCV RBC AUTO: 76 FL — LOW (ref 80–100)
MONOCYTES # BLD AUTO: 1.11 K/UL — HIGH (ref 0–0.9)
MONOCYTES NFR BLD AUTO: 5.9 % — SIGNIFICANT CHANGE UP (ref 2–14)
NEUTROPHILS # BLD AUTO: 16.38 K/UL — HIGH (ref 1.8–7.4)
NEUTROPHILS NFR BLD AUTO: 86.7 % — HIGH (ref 43–77)
NRBC # FLD: 0 K/UL — SIGNIFICANT CHANGE UP (ref 0–0)
NRBC # FLD: 0.08 K/UL — SIGNIFICANT CHANGE UP (ref 0–0)
PLATELET # BLD AUTO: 340 K/UL — SIGNIFICANT CHANGE UP (ref 150–400)
PLATELET # BLD AUTO: 364 K/UL — SIGNIFICANT CHANGE UP (ref 150–400)
PMV BLD: 9.4 FL — SIGNIFICANT CHANGE UP (ref 7–13)
PMV BLD: 9.5 FL — SIGNIFICANT CHANGE UP (ref 7–13)
POTASSIUM SERPL-MCNC: 4.7 MMOL/L — SIGNIFICANT CHANGE UP (ref 3.5–5.3)
POTASSIUM SERPL-SCNC: 4.7 MMOL/L — SIGNIFICANT CHANGE UP (ref 3.5–5.3)
RBC # BLD: 3.51 M/UL — LOW (ref 3.8–5.2)
RBC # BLD: 3.59 M/UL — LOW (ref 3.8–5.2)
RBC # FLD: 17.1 % — HIGH (ref 10.3–14.5)
RBC # FLD: 17.2 % — HIGH (ref 10.3–14.5)
RH IG SCN BLD-IMP: POSITIVE — SIGNIFICANT CHANGE UP
SODIUM SERPL-SCNC: 133 MMOL/L — LOW (ref 135–145)
WBC # BLD: 18.86 K/UL — HIGH (ref 3.8–10.5)
WBC # BLD: 20.07 K/UL — HIGH (ref 3.8–10.5)
WBC # FLD AUTO: 18.86 K/UL — HIGH (ref 3.8–10.5)
WBC # FLD AUTO: 20.07 K/UL — HIGH (ref 3.8–10.5)

## 2019-06-04 PROCEDURE — 99239 HOSP IP/OBS DSCHRG MGMT >30: CPT

## 2019-06-04 RX ORDER — AMLODIPINE BESYLATE 2.5 MG/1
1 TABLET ORAL
Qty: 30 | Refills: 0
Start: 2019-06-04 | End: 2019-07-03

## 2019-06-04 RX ADMIN — Medication 325 MILLIGRAM(S): at 05:16

## 2019-06-04 RX ADMIN — AMLODIPINE BESYLATE 5 MILLIGRAM(S): 2.5 TABLET ORAL at 05:16

## 2019-06-04 RX ADMIN — CEFTRIAXONE 100 GRAM(S): 500 INJECTION, POWDER, FOR SOLUTION INTRAMUSCULAR; INTRAVENOUS at 12:22

## 2019-06-04 RX ADMIN — HEPARIN SODIUM 5000 UNIT(S): 5000 INJECTION INTRAVENOUS; SUBCUTANEOUS at 05:16

## 2019-06-04 NOTE — PROGRESS NOTE ADULT - PROBLEM SELECTOR PLAN 7
anemia is likely multifactorial (iron deficiency, vaginal bleeding, chronic disease)  - continuing on ferrous sulfate 325 mg BID
- abdomen firm and protuberant and w/ R-sided tenderness  - reportedly w/ Stage IV-B disease; has lung metastases  - with associated vaginal bleeding  - being followed by GYN (appreciated)  - patient reports being scheduled for starting chemotherapy next week  - oncology consult in the AM (please call)
anemia is likely multifactorial (iron deficiency, vaginal bleeding, chronic disease)  - continuing on ferrous sulfate 325 mg BID
anemia is likely multifactorial (iron deficiency, vaginal bleeding, chronic disease)  - continuing on ferrous sulfate 325 mg BID

## 2019-06-04 NOTE — PROGRESS NOTE ADULT - PROBLEM SELECTOR PLAN 6
- abdomen firm and protuberant   f/u pt's private oncologist for chemotherapy on Wed
- abdomen firm and protuberant   f/u pt's private oncologist for chemotherapy on Wed
- ongoing issue, in the setting of leiomyosarcoma of the uterus  - being followed by GYN (appreciated)  - Hgb currently 8.7; f/u trend with repeat lab-work  - Oncology consult in the AM (please call)
- abdomen firm and protuberant   stage IV with lung mets, s/p recent bx which is positive for malignant cells   f/u pt's private oncologist for chemotherapy tomorrow

## 2019-06-04 NOTE — DISCHARGE NOTE PROVIDER - HOSPITAL COURSE
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  Diagnosed with DVT.        Problem/Plan - 1:    ·  Problem: Hypercoagulable state.  Plan: - DVT initially of LLE in 04/2019, and now w/ RLE DVT    - was on Eliquis, but held for IVC filter placement on 05/23/2019    - reports being sedentary for long periods - due to weakness/fatigue; no recent travel    - needs anticoagulation, but currently contraindicated due to vaginal bleeding    - prescribed heparin 5000 IU SQ Q12H..        Problem/Plan - 2:    ·  Problem: Hyperkalemia.  Plan: - potassium = 5.6 (non-hemolyzed) on admission    - in the setting of renal failure and CKD    - likely due to dehydration, but will hold Losartan for now    - s/p regular insulin 5 units and Dextrose 50 mL        Problem/Plan - 3:    ·  Problem: Bilateral lower extremity edema.  Plan: - pronounced, with pitting    - tenderness to palpation of both legs    - likely due to nutritional state (albumin = 2.8), and possibly due to renal loss (UA ordered)     - elevate B/L LE while sitting and in bed.          Case discussed with Dr. Parra, pt medically stable for discharge. 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  Diagnosed with DVT.        Problem/Plan - 1:    ·  Problem: Hypercoagulable state.  Plan: - DVT initially of LLE in 04/2019, and now w/ RLE DVT    - was on Eliquis, but held for IVC filter placement on 05/23/2019    - reports being sedentary for long periods - due to weakness/fatigue; no recent travel    - needs anticoagulation, but currently contraindicated due to vaginal bleeding. Pt will follow up with oncologist tomorrow to discuss further anticoagulation safety.    - prescribed heparin 5000 IU SQ Q12H..        Problem/Plan - 2:    ·  Problem: Hyperkalemia.  Plan: - potassium = 5.6 (non-hemolyzed) on admission    - in the setting of renal failure and CKD    - likely due to dehydration. Losartan was held. Pt will be started on Amlodipine for blood pressure.    - s/p regular insulin 5 units and Dextrose 50 mL        Problem/Plan - 3:    ·  Problem: Bilateral lower extremity edema.  Plan: - pronounced, with pitting    - tenderness to palpation of both legs    - likely due to nutritional state (albumin = 2.8), and possibly due to renal loss (UA ordered)     - elevate B/L LE while sitting and in bed.          Case discussed with Dr. Parra, pt medically stable for discharge. 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  Diagnosed with DVT.        Problem/Plan - 1:    ·  Problem: Hypercoagulable state.  Plan: - DVT initially of LLE in 04/2019, and now w/ RLE DVT    - was on Eliquis, but held for IVC filter placement on 05/23/2019    - reports being sedentary for long periods - due to weakness/fatigue; no recent travel    - needs anticoagulation, but currently contraindicated due to vaginal bleeding and anemia. Pts hemoglobin runs in the low 8's. Todays Hemoglobin was 8.1 Pt will follow up with oncologist tomorrow to discuss further anticoagulation safety.    - prescribed heparin 5000 IU SQ Q12H..        Problem/Plan - 2:    ·  Problem: Hyperkalemia.  Plan: - potassium = 5.6 (non-hemolyzed) on admission    - in the setting of renal failure and CKD    - likely due to dehydration. Losartan was held. Pt will be started on Amlodipine for blood pressure.    - s/p regular insulin 5 units and Dextrose 50 mL        Problem/Plan - 3:    ·  Problem: Bilateral lower extremity edema.  Plan: - pronounced, with pitting    - tenderness to palpation of both legs    - likely due to nutritional state (albumin = 2.8), and possibly due to renal loss (UA ordered)     - elevate B/L LE while sitting and in bed.          Case discussed with Dr. Parra, pt medically stable for discharge. 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  Diagnosed with DVT.        DVT initially of LLE in 04/2019, and now w/ new RLE DVT    - was on Eliquis, but held for IVC filter placement on 05/23/2019 and lung bx     - reports being sedentary for long periods - due to weakness/fatigue; no recent travel    - needs anticoagulation, but currently contraindicated due to vaginal bleeding and anemia. Pts hemoglobin runs in the low 8's. Todays Hemoglobin was 8.1 Pt will follow up with oncologist tomorrow to discuss further anticoagulation safety.        Pt presented with Hyperkalemia and JENS. resolved prior to dc. Her BP medicine switched from losartan to  Amlodipine         Pt has an appt with her private oncologist on 6/5 for chemo planning. Pt was evaluated by onc and GynOnc while in the hospital. House onc thinks it is a priority to see her private oncologist and may resume full AC if vaginal bleed can be stopped by chemo.

## 2019-06-04 NOTE — PROGRESS NOTE ADULT - ATTENDING COMMENTS
overall prognosis is guarded
overall prognosis is guarded
overall prognosis is guarded  I spoke with Dr Burns over the phone. Updates were given. He will follow up pt tomorrow in the office   Pt is eager to go home today and refused further inpatient management or monitoring.  dc home today. Time 40 min

## 2019-06-04 NOTE — DISCHARGE NOTE NURSING/CASE MANAGEMENT/SOCIAL WORK - NSDCDPATPORTLINK_GEN_ALL_CORE
You can access the [facility] Patient Portal, offered by [facility], by registering with the following website: [email or web address]

## 2019-06-04 NOTE — PHYSICAL THERAPY INITIAL EVALUATION ADULT - PERTINENT HX OF CURRENT PROBLEM, REHAB EVAL
This is a 69 year old female, with past history significant for Uterine cancer w/ mets to the lungs,, DVT, admitted  secondary to inability to ambulate due to leg pain.

## 2019-06-04 NOTE — PROGRESS NOTE ADULT - PROBLEM SELECTOR PROBLEM 4
Difficulty walking
Leukocytosis, unspecified type

## 2019-06-04 NOTE — DISCHARGE NOTE PROVIDER - CARE PROVIDER_API CALL
bijan simmons  PCP  Phone: (   )    -  Fax: (   )    -  Follow Up Time:     Kaylin fernandez  Oncologist  Phone: (   )    -  Fax: (   )    -  Follow Up Time:

## 2019-06-04 NOTE — PROGRESS NOTE ADULT - PROBLEM SELECTOR PLAN 1
- DVT initially of LLE in 04/2019 5/31 duplex Acute deep venous thrombosis: above the knee on the left and and below the knee right.   - was on Eliquis, but held for IVC filter placement on 05/23/2019 and held for lung bx  - reports being sedentary for long periods - due to weakness/fatigue; no recent travel  - needs anticoagulation, but currently contraindicated due to vaginal bleeding per oncology  Pt has no cp/sob at this time. Saturating well under room air. - DVT initially of LLE in 04/2019 5/31 duplex Acute deep venous thrombosis: above the knee on the left and and below the knee right.   - was on Eliquis, but held for IVC filter placement on 05/23/2019 and held for lung bx  - reports being sedentary for long periods - due to weakness/fatigue; no recent travel  - needs anticoagulation, but currently contraindicated due to vaginal bleeding per oncology. Unstable kidney function is another concern for NOAC or lovenox (Pt stated she can't do injection herself anyway)   Pt has no cp/sob at this time. Saturating well under room air.

## 2019-06-04 NOTE — PROGRESS NOTE ADULT - PROBLEM SELECTOR PLAN 9
- no acute issues presently  - holding Losartan due to slightly worsened renal function and hyperkalemia  - prescribed amlodipine 5 mg QD in lieu of losartan  - f/u w/ repeat measurements and modify therapy, as needed
likely prerenal / dehydration   resolved now

## 2019-06-04 NOTE — PROGRESS NOTE ADULT - PROBLEM SELECTOR PROBLEM 7
Iron deficiency anemia, unspecified iron deficiency anemia type
Iron deficiency anemia, unspecified iron deficiency anemia type
Leiomyosarcoma of uterus
Iron deficiency anemia, unspecified iron deficiency anemia type

## 2019-06-04 NOTE — PROGRESS NOTE ADULT - PROBLEM SELECTOR PROBLEM 8
Essential hypertension
Essential hypertension
Iron deficiency anemia, unspecified iron deficiency anemia type
Essential hypertension

## 2019-06-04 NOTE — PROGRESS NOTE ADULT - PROBLEM SELECTOR PLAN 8
- no acute issues presently  - holding Losartan due to worsened renal function and hyperkalemia at admission   - prescribed amlodipine 5 mg QD in lieu of losartan
- hemoglobin currently 8.7  - iron studies performed on 04/30/2019 w/ iron = 15, Iron saturation = 8, Ferritin = 313, Iron, TIBC = 188  - anemia is likely multifactorial, however; due to iron deficiency, vaginal bleeding, chronic disease  - continuing on ferrous sulfate 325 mg BID  - no stool softener prescribed presently since patient states not difficulty with bowel movement, but would evaluate for adequate BMs periodically  - holding off on adding vitamin C to aid absorption (because of renal failure)
- no acute issues presently  - holding Losartan due to worsened renal function and hyperkalemia at admission   - prescribed amlodipine 5 mg QD in lieu of losartan
- no acute issues presently  - holding Losartan due to worsened renal function and hyperkalemia at admission   - prescribed amlodipine 5 mg QD in lieu of losartan

## 2019-06-04 NOTE — DISCHARGE NOTE PROVIDER - NSDCCPCAREPLAN_GEN_ALL_CORE_FT
PRINCIPAL DISCHARGE DIAGNOSIS  Diagnosis: Deep vein thrombosis (DVT)  Assessment and Plan of Treatment: You had a doppler done that showed a clot in your leg. You were on Eliquis but it was held due to vaginal bleeding from your uterine cancer. Please see your PCP in 1-2 weeks.      SECONDARY DISCHARGE DIAGNOSES  Diagnosis: Leiomyosarcoma of uterus  Assessment and Plan of Treatment: Please start your chemotherapy for your uterine cancer as scheduled. You have an appointment tomorrow 6/5. PRINCIPAL DISCHARGE DIAGNOSIS  Diagnosis: Deep vein thrombosis (DVT)  Assessment and Plan of Treatment: You had a doppler done that showed a clot in your leg. You were on Eliquis but it was held due to vaginal bleeding from your uterine cancer. Please speak to your Oncologist tomorrow regarding the safety of anticoagulation. Please see your PCP in 1-2 weeks.      SECONDARY DISCHARGE DIAGNOSES  Diagnosis: Essential hypertension  Assessment and Plan of Treatment: Your blood pressure medication was switched to Amlodipine 5mg since your kidney function was affected by your previous medication (Losartan). Please continue to take Amlodipine 5mg every day.    Diagnosis: Leiomyosarcoma of uterus  Assessment and Plan of Treatment: Please start your chemotherapy for your uterine cancer as scheduled. You have an appointment tomorrow 6/5.

## 2019-06-04 NOTE — PROGRESS NOTE ADULT - PROBLEM SELECTOR PLAN 5
- ongoing issue, in the setting of leiomyosarcoma of the uterus  - being followed by GYN  monitor H/H closely  repeat CBC today, transfuse if hgb < 7
- WBC = 22.18 (repeat w/ differentials ordered)  - now reports some burning/itching with micturition  - has low grade temperature this AM (99.1)  - UA c/w UTI  - start ceftriaxone 1 g daily  - f/u UCx
- ongoing issue, in the setting of leiomyosarcoma of the uterus  - being followed by GYN  monitor H/H closely  transfuse if hgb < 7
- ongoing issue, in the setting of leiomyosarcoma of the uterus  - being followed by GYN  monitor H/H closely, stable   transfuse if hgb < 7

## 2019-06-04 NOTE — PROGRESS NOTE ADULT - SUBJECTIVE AND OBJECTIVE BOX
CHIEF COMPLAINT: Patient is a 69y old  female who presents with a chief complaint of RLE DVT  Hyperkalemia (2019 04:37)      SUBJECTIVE / OVERNIGHT EVENTS:    Denies pain. Continues to complain of vaginal bleeding - unable to describe exact amount. Denies LE pain or discomfort. Denies SOB.    MEDICATIONS  (STANDING):  amLODIPine   Tablet 5 milliGRAM(s) Oral daily  cefTRIAXone   IVPB      cefTRIAXone   IVPB 1 Gram(s) IV Intermittent once  ferrous    sulfate 325 milliGRAM(s) Oral two times a day  heparin  Injectable 5000 Unit(s) SubCutaneous every 12 hours    MEDICATIONS  (PRN):      VITALS:  T(F): 98.2 (19 @ 06:22), Max: 99.1 (19 @ 03:42)  HR: 100 (19 @ 06:22) (93 - 109)  BP: 141/85 (19 @ 06:22) (131/87 - 148/90)  RR: 18 (19 @ 06:22) (18 - 18)  SpO2: 100% (19 @ 06:22)  Height (cm): 165.1 (03:42)  Weight (kg): 50.6 (03:42)  BMI (kg/m2): 18.6 (03:42)    CAPILLARY BLOOD GLUCOSE    Output   Height (cm): 165.1 (03:42)  Weight (kg): 50.6 (03:42)  BMI (kg/m2): 18.6 (03:42)  I&O's Summary  T(F): 98.2 (19 @ 06:22), Max: 99.1 (19 @ 03:42)  HR: 100 (19 @ :22) (93 - 109)  BP: 141/85 (19 @ 06:22) (131/87 - 148/90)  RR: 18 (19 @ 06:22) (18 - 18)  SpO2: 100% (19 @ 06:22)    PHYSICAL EXAM:  GENERAL: NAD  HEAD:  Atraumatic, Normocephalic  EYES: EOMI  NECK: Supple, No JVD  CHEST/LUNG: nonlabored breathing  HEART: nl S1/S2  ABDOMEN: nondistended, soft  EXTREMITIES:  2+ pitting edema in b/l LE extremities  PSYCH: alert, answering questions appropriately  NEUROLOGY: non-focal  SKIN: No rashes noted    LABS:              8.2                  133  | 19   | 36           19.90 >-----------< 366     ------------------------< 120                   27.4                 5.1  | 100  | 1.68                                         Ca 9.3   Mg 1.9   Ph 3.0         TPro  6.3  /  Alb  2.3      TBili  0.2  /  DBili  x         AST  13  /  ALT  9             AlkPhos  177      INR: 1.20<H>;    PT: 13.4 SEC<H>;    PTT: 27.7 SEC        Urinalysis Basic - ( 2019 06:23 )    Color: YELLOW / Appearance: HAZY / S.016 / pH: 5.5  Gluc: TRACE / Ketone: NEGATIVE  / Bili: NEGATIVE / Urobili: NORMAL   Blood: MODERATE / Protein: MODERATE / Nitrite: NEGATIVE   Leuk Esterase: LARGE / RBC: 26-50 / WBC >50   Sq Epi: x / Non Sq Epi: FEW / Bacteria: FEW            MICROBIOLOGY:        RADIOLOGY & ADDITIONAL TESTS:    Imaging Personally Reviewed:    < from: Xray Chest 1 View AP/PA (19 @ 07:40) >      < end of copied text >        [x] Care Discussed with Consultants/Other Providers:      Talisha Epperson M.D.  Hospitalist  Pager [phone]
Patient is a 69y old  Female who presents with a chief complaint of Deep vein thrombosis (02 Jun 2019 13:02)      SUBJECTIVE / OVERNIGHT EVENTS:  Pt feels ok overall, denied cp/sob/dizziness/fever/chills. She reports she only has brown spotting now   Noted to have T100.1 yesterday 8pm.     MEDICATIONS  (STANDING):  amLODIPine   Tablet 5 milliGRAM(s) Oral daily  bisacodyl 5 milliGRAM(s) Oral at bedtime  cefTRIAXone   IVPB      cefTRIAXone   IVPB 1 Gram(s) IV Intermittent every 24 hours  ferrous    sulfate 325 milliGRAM(s) Oral two times a day  heparin  Injectable 5000 Unit(s) SubCutaneous every 12 hours    MEDICATIONS  (PRN):      T(C): 36.6 (06-03-19 @ 12:08), Max: 37.8 (06-02-19 @ 20:52)  HR: 89 (06-03-19 @ 12:08) (89 - 95)  BP: 130/75 (06-03-19 @ 12:08) (130/75 - 143/81)  RR: 18 (06-03-19 @ 12:08) (18 - 18)  SpO2: 99% (06-03-19 @ 12:08) (99% - 100%)  CAPILLARY BLOOD GLUCOSE        I&O's Summary      PHYSICAL EXAM:  GENERAL: NAD, chronic ill appearance   HEAD:  Atraumatic, Normocephalic  EYES: EOMI, PERRLA, conjunctiva and sclera clear  NECK: Supple, No JVD  CHEST/LUNG: Clear to auscultation bilaterally; No wheeze  HEART: s1 s2, regular rhythm and rate   ABDOMEN: Soft, Nontender, distended, hard; Bowel sounds present  EXTREMITIES:  + edema in both legs, left is bigger than right   PSYCH: AAOx3, calm   NEUROLOGY: non-focal  SKIN: No rashes or lesions      LABS:                        8.0    18.34 )-----------( 334      ( 03 Jun 2019 05:58 )             26.3     06-03    135  |  101  |  22  ----------------------------<  117<H>  5.2   |  21<L>  |  1.45<H>    Ca    9.5      03 Jun 2019 05:58  Phos  2.8     06-03  Mg     1.7     06-03                RADIOLOGY & ADDITIONAL TESTS:    Imaging Personally Reviewed:    Consultant(s) Notes Reviewed:      Care Discussed with Consultants/Other Providers:
Patient is a 69y old  Female who presents with a chief complaint of Deep vein thrombosis (04 Jun 2019 12:46)      SUBJECTIVE / OVERNIGHT EVENTS:  Pt is able to ambulate by herself. + brown discharge from vagina. Pt denied cp/sob/dizziness/palpitation/abd pain. She wants go home today and plan to see Dr. Burns tomorrow     MEDICATIONS  (STANDING):  amLODIPine   Tablet 5 milliGRAM(s) Oral daily  bisacodyl 5 milliGRAM(s) Oral at bedtime  cefTRIAXone   IVPB      cefTRIAXone   IVPB 1 Gram(s) IV Intermittent every 24 hours  ferrous    sulfate 325 milliGRAM(s) Oral two times a day  heparin  Injectable 5000 Unit(s) SubCutaneous every 12 hours    MEDICATIONS  (PRN):      T(C): 36.7 (06-04-19 @ 12:18), Max: 36.9 (06-03-19 @ 21:58)  HR: 93 (06-04-19 @ 12:18) (92 - 100)  BP: 115/68 (06-04-19 @ 12:18) (115/68 - 138/80)  RR: 17 (06-04-19 @ 12:18) (17 - 17)  SpO2: 100% (06-04-19 @ 12:18) (98% - 100%)  CAPILLARY BLOOD GLUCOSE        I&O's Summary      PHYSICAL EXAM:  GENERAL: NAD, chronic ill appearance   HEAD:  Atraumatic, Normocephalic  EYES: EOMI, PERRLA, conjunctiva and sclera clear  NECK: Supple, No JVD  CHEST/LUNG: Clear to auscultation bilaterally; No wheeze  HEART: s1 s2, regular rhythm and rate   ABDOMEN: Soft, Nontender, distended, hard; Bowel sounds present  EXTREMITIES:  + edema in both legs, left is bigger than right, denied pain, + pedal pulses   PSYCH: AAOx3, calm   NEUROLOGY: non-focal  SKIN: No rashes or lesions    LABS:                        8.1    20.07 )-----------( 340      ( 04 Jun 2019 15:07 )             27.3     06-04    133<L>  |  99  |  18  ----------------------------<  125<H>  4.7   |  21<L>  |  1.30    Ca    8.8      04 Jun 2019 06:04  Phos  2.8     06-03  Mg     1.6     06-04                RADIOLOGY & ADDITIONAL TESTS:    Imaging Personally Reviewed:    Consultant(s) Notes Reviewed:      Care Discussed with Consultants/Other Providers:
Patient is a 69y old  Female who presents with a chief complaint of Deep vein thrombosis (2019 13:02)      SUBJECTIVE / OVERNIGHT EVENTS:  Pt was seen and examined in AM. Pt states pain in LLE is controlled. denied cp/sob/dizziness/palpitation     MEDICATIONS  (STANDING):  amLODIPine   Tablet 5 milliGRAM(s) Oral daily  bisacodyl 5 milliGRAM(s) Oral at bedtime  cefTRIAXone   IVPB      cefTRIAXone   IVPB 1 Gram(s) IV Intermittent every 24 hours  ferrous    sulfate 325 milliGRAM(s) Oral two times a day  heparin  Injectable 5000 Unit(s) SubCutaneous every 12 hours    MEDICATIONS  (PRN):      T(C): 36.9 (19 @ 14:22), Max: 37.6 (19 @ 05:14)  HR: 92 (19 @ 14:22) (91 - 98)  BP: 137/83 (19 @ 14:22) (129/80 - 137/83)  RR: 17 (19 @ 14:22) (16 - 17)  SpO2: 99% (19 @ 14:22) (98% - 99%)  CAPILLARY BLOOD GLUCOSE        I&O's Summary      PHYSICAL EXAM:  GENERAL: NAD, chronic ill appearance   HEAD:  Atraumatic, Normocephalic  EYES: EOMI, PERRLA, conjunctiva and sclera clear  NECK: Supple, No JVD  CHEST/LUNG: Clear to auscultation bilaterally; No wheeze  HEART: s1 s2, regular rhythm and rate   ABDOMEN: Soft, Nontender, distended, hard; Bowel sounds present  EXTREMITIES:  + edema in both legs, left is bigger than right   PSYCH: AAOx3, calm   NEUROLOGY: non-focal  SKIN: No rashes or lesions    LABS:                        7.5    18.93 )-----------( 313      ( 2019 05:25 )             24.9     06-02    135  |  101  |  26<H>  ----------------------------<  129<H>  4.8   |  20<L>  |  1.46<H>    Ca    8.9      2019 05:25  Phos  2.6     06-  Mg     1.7     06-    TPro  6.3  /  Alb  2.3<L>  /  TBili  0.2  /  DBili  x   /  AST  13  /  ALT  9   /  AlkPhos  177<H>      PT/INR - ( 31 May 2019 23:27 )   PT: 13.4 SEC;   INR: 1.20          PTT - ( 31 May 2019 23:27 )  PTT:27.7 SEC      Urinalysis Basic - ( 2019 06:23 )    Color: YELLOW / Appearance: HAZY / S.016 / pH: 5.5  Gluc: TRACE / Ketone: NEGATIVE  / Bili: NEGATIVE / Urobili: NORMAL   Blood: MODERATE / Protein: MODERATE / Nitrite: NEGATIVE   Leuk Esterase: LARGE / RBC: 26-50 / WBC >50   Sq Epi: x / Non Sq Epi: FEW / Bacteria: FEW        RADIOLOGY & ADDITIONAL TESTS:    Imaging Personally Reviewed:    Consultant(s) Notes Reviewed:      Care Discussed with Consultants/Other Providers:

## 2019-06-04 NOTE — PROGRESS NOTE ADULT - PROBLEM SELECTOR PLAN 3
- pronounced, with pitting  - tenderness to palpation of both legs  - likely due to nutritional state (albumin = 2.8), and possibly due to renal loss (UA ordered)   - elevate B/L LE while sitting and in bed
multifactorial, suggesting decline in functional status  c/w supportive care
multifactorial, suggesting decline in functional status  c/w supportive care  PT rec home with home PT
multifactorial, suggesting decline in functional status  c/w supportive care

## 2019-06-04 NOTE — DISCHARGE NOTE PROVIDER - PROVIDER TOKENS
FREE:[LAST:[troy],FIRST:[bijan],PHONE:[(   )    -],FAX:[(   )    -],ADDRESS:[PCP]],FREE:[LAST:[rebecca],FIRST:[Kaylin],PHONE:[(   )    -],FAX:[(   )    -],ADDRESS:[Oncologist]]

## 2019-06-04 NOTE — PROGRESS NOTE ADULT - PROBLEM SELECTOR PLAN 4
- due to pain, chiefly of the B/L calf areas  - contributing toward frequent sedentary state  - recently acquired a walker to aid ambulation  - out of bed to chair  - ambulation with walker and physical therapy evaluation when optimal
on ceftriaxone 1 g daily for empiric treatment of UTI  - f/u UCx, NGTD  leukocytosis could be related to underlying malignancy  fever, multifactorial, thrombosis/malignancy   clinically unchanged. will monitor off abx for now
on ceftriaxone 1 g daily for empiric treatment of UTI  - f/u UCx, NGTD  leukocytosis could be related to underlying malignancy/thrombosis   fever, multifactorial, thrombosis/malignancy   clinically unchanged. will monitor off abx for now
on ceftriaxone 1 g daily for empiric treatment of UTI  - f/u UCx, NGTD  leukocytosis could be related to underlying malignancy

## 2019-06-04 NOTE — PROGRESS NOTE ADULT - PROBLEM SELECTOR PLAN 2
- potassium = 5.6 (non-hemolyzed) on admission  - in the setting of renal failure and CKD  - likely due to dehydration, but will hold Losartan for now  - s/p regular insulin 5 units and Dextrose 50 mL  - f/u repeat level in the AM
- potassium = 5.6 (non-hemolyzed) on admission  - in the setting of renal failure and CKD  - likely due to dehydration, but will hold Losartan for now  resolved along with Cr trending down

## 2019-06-24 LAB — FUNGUS SPEC QL CULT: SIGNIFICANT CHANGE UP

## 2019-07-22 PROBLEM — R91.8 OTHER NONSPECIFIC ABNORMAL FINDING OF LUNG FIELD: Chronic | Status: ACTIVE | Noted: 2019-06-01

## 2019-07-22 PROBLEM — D50.9 IRON DEFICIENCY ANEMIA, UNSPECIFIED: Chronic | Status: ACTIVE | Noted: 2019-06-01

## 2019-07-22 PROBLEM — C55 MALIGNANT NEOPLASM OF UTERUS, PART UNSPECIFIED: Chronic | Status: ACTIVE | Noted: 2019-06-01

## 2019-07-22 PROBLEM — N93.9 ABNORMAL UTERINE AND VAGINAL BLEEDING, UNSPECIFIED: Chronic | Status: ACTIVE | Noted: 2019-06-01

## 2019-07-26 ENCOUNTER — OUTPATIENT (OUTPATIENT)
Dept: OUTPATIENT SERVICES | Facility: HOSPITAL | Age: 69
LOS: 1 days | End: 2019-07-26
Payer: MEDICARE

## 2019-07-26 ENCOUNTER — APPOINTMENT (OUTPATIENT)
Dept: CT IMAGING | Facility: IMAGING CENTER | Age: 69
End: 2019-07-26
Payer: MEDICARE

## 2019-07-26 DIAGNOSIS — Z98.890 OTHER SPECIFIED POSTPROCEDURAL STATES: Chronic | ICD-10-CM

## 2019-07-26 DIAGNOSIS — R91.1 SOLITARY PULMONARY NODULE: ICD-10-CM

## 2019-07-26 DIAGNOSIS — Z95.828 PRESENCE OF OTHER VASCULAR IMPLANTS AND GRAFTS: Chronic | ICD-10-CM

## 2019-07-26 PROCEDURE — 71250 CT THORAX DX C-: CPT | Mod: 26

## 2019-07-26 PROCEDURE — 74176 CT ABD & PELVIS W/O CONTRAST: CPT

## 2019-07-26 PROCEDURE — 71250 CT THORAX DX C-: CPT

## 2019-07-26 PROCEDURE — 74176 CT ABD & PELVIS W/O CONTRAST: CPT | Mod: 26

## 2019-07-30 ENCOUNTER — RESULT REVIEW (OUTPATIENT)
Age: 69
End: 2019-07-30

## 2019-07-30 ENCOUNTER — OUTPATIENT (OUTPATIENT)
Dept: OUTPATIENT SERVICES | Facility: HOSPITAL | Age: 69
LOS: 1 days | End: 2019-07-30
Payer: MEDICARE

## 2019-07-30 ENCOUNTER — OUTPATIENT (OUTPATIENT)
Dept: OUTPATIENT SERVICES | Facility: HOSPITAL | Age: 69
LOS: 1 days | Discharge: HOME | End: 2019-07-30

## 2019-07-30 ENCOUNTER — APPOINTMENT (OUTPATIENT)
Age: 69
End: 2019-07-30

## 2019-07-30 DIAGNOSIS — Z98.890 OTHER SPECIFIED POSTPROCEDURAL STATES: Chronic | ICD-10-CM

## 2019-07-30 DIAGNOSIS — D64.9 ANEMIA, UNSPECIFIED: ICD-10-CM

## 2019-07-30 DIAGNOSIS — Z95.828 PRESENCE OF OTHER VASCULAR IMPLANTS AND GRAFTS: Chronic | ICD-10-CM

## 2019-07-30 LAB
BLD GP AB SCN SERPL QL: NEGATIVE — SIGNIFICANT CHANGE UP
RH IG SCN BLD-IMP: POSITIVE — SIGNIFICANT CHANGE UP
RH IG SCN BLD-IMP: POSITIVE — SIGNIFICANT CHANGE UP

## 2019-07-30 PROCEDURE — 86901 BLOOD TYPING SEROLOGIC RH(D): CPT

## 2019-07-30 PROCEDURE — 86923 COMPATIBILITY TEST ELECTRIC: CPT

## 2019-07-30 PROCEDURE — 86850 RBC ANTIBODY SCREEN: CPT

## 2019-07-30 PROCEDURE — 86900 BLOOD TYPING SEROLOGIC ABO: CPT

## 2019-07-31 ENCOUNTER — APPOINTMENT (OUTPATIENT)
Age: 69
End: 2019-07-31

## 2019-08-01 ENCOUNTER — INPATIENT (INPATIENT)
Facility: HOSPITAL | Age: 69
LOS: 3 days | Discharge: NURSING, REHAB OR LONG-TERM CARE | End: 2019-08-05
Attending: INTERNAL MEDICINE | Admitting: INTERNAL MEDICINE
Payer: MEDICARE

## 2019-08-01 VITALS
WEIGHT: 110.01 LBS | DIASTOLIC BLOOD PRESSURE: 91 MMHG | SYSTOLIC BLOOD PRESSURE: 137 MMHG | HEIGHT: 65 IN | RESPIRATION RATE: 22 BRPM | TEMPERATURE: 101 F | OXYGEN SATURATION: 88 % | HEART RATE: 126 BPM

## 2019-08-01 DIAGNOSIS — Z51.89 ENCOUNTER FOR OTHER SPECIFIED AFTERCARE: ICD-10-CM

## 2019-08-01 DIAGNOSIS — Z95.828 PRESENCE OF OTHER VASCULAR IMPLANTS AND GRAFTS: Chronic | ICD-10-CM

## 2019-08-01 DIAGNOSIS — I82.409 ACUTE EMBOLISM AND THROMBOSIS OF UNSPECIFIED DEEP VEINS OF UNSPECIFIED LOWER EXTREMITY: ICD-10-CM

## 2019-08-01 DIAGNOSIS — Z98.890 OTHER SPECIFIED POSTPROCEDURAL STATES: Chronic | ICD-10-CM

## 2019-08-01 DIAGNOSIS — A41.9 SEPSIS, UNSPECIFIED ORGANISM: ICD-10-CM

## 2019-08-01 DIAGNOSIS — Z29.9 ENCOUNTER FOR PROPHYLACTIC MEASURES, UNSPECIFIED: ICD-10-CM

## 2019-08-01 LAB
ALBUMIN SERPL ELPH-MCNC: 1.3 G/DL — LOW (ref 3.3–5)
ALP SERPL-CCNC: 205 U/L — HIGH (ref 40–120)
ALT FLD-CCNC: 11 U/L — LOW (ref 12–78)
ANION GAP SERPL CALC-SCNC: 9 MMOL/L — SIGNIFICANT CHANGE UP (ref 5–17)
ANISOCYTOSIS BLD QL: SLIGHT — SIGNIFICANT CHANGE UP
APTT BLD: 29.8 SEC — SIGNIFICANT CHANGE UP (ref 27.5–36.3)
AST SERPL-CCNC: 16 U/L — SIGNIFICANT CHANGE UP (ref 15–37)
BASOPHILS # BLD AUTO: 0.06 K/UL — SIGNIFICANT CHANGE UP (ref 0–0.2)
BASOPHILS NFR BLD AUTO: 0.3 % — SIGNIFICANT CHANGE UP (ref 0–2)
BILIRUB SERPL-MCNC: 0.4 MG/DL — SIGNIFICANT CHANGE UP (ref 0.2–1.2)
BLD GP AB SCN SERPL QL: SIGNIFICANT CHANGE UP
BUN SERPL-MCNC: 23 MG/DL — SIGNIFICANT CHANGE UP (ref 7–23)
CALCIUM SERPL-MCNC: 8.6 MG/DL — SIGNIFICANT CHANGE UP (ref 8.5–10.1)
CHLORIDE SERPL-SCNC: 112 MMOL/L — HIGH (ref 96–108)
CO2 SERPL-SCNC: 23 MMOL/L — SIGNIFICANT CHANGE UP (ref 22–31)
CREAT SERPL-MCNC: 1.47 MG/DL — HIGH (ref 0.5–1.3)
D DIMER BLD IA.RAPID-MCNC: 5499 NG/ML DDU — HIGH
EOSINOPHIL # BLD AUTO: 0.05 K/UL — SIGNIFICANT CHANGE UP (ref 0–0.5)
EOSINOPHIL NFR BLD AUTO: 0.3 % — SIGNIFICANT CHANGE UP (ref 0–6)
GLUCOSE SERPL-MCNC: 142 MG/DL — HIGH (ref 70–99)
HCT VFR BLD CALC: 32.5 % — LOW (ref 34.5–45)
HGB BLD-MCNC: 9.7 G/DL — LOW (ref 11.5–15.5)
IMM GRANULOCYTES NFR BLD AUTO: 1.2 % — SIGNIFICANT CHANGE UP (ref 0–1.5)
INR BLD: 1.26 RATIO — HIGH (ref 0.88–1.16)
LACTATE SERPL-SCNC: 2.1 MMOL/L — HIGH (ref 0.7–2)
LACTATE SERPL-SCNC: 2.4 MMOL/L — HIGH (ref 0.7–2)
LYMPHOCYTES # BLD AUTO: 1.01 K/UL — SIGNIFICANT CHANGE UP (ref 1–3.3)
LYMPHOCYTES # BLD AUTO: 5.5 % — LOW (ref 13–44)
MANUAL SMEAR VERIFICATION: SIGNIFICANT CHANGE UP
MCHC RBC-ENTMCNC: 26.7 PG — LOW (ref 27–34)
MCHC RBC-ENTMCNC: 29.8 GM/DL — LOW (ref 32–36)
MCV RBC AUTO: 89.5 FL — SIGNIFICANT CHANGE UP (ref 80–100)
MONOCYTES # BLD AUTO: 0.57 K/UL — SIGNIFICANT CHANGE UP (ref 0–0.9)
MONOCYTES NFR BLD AUTO: 3.1 % — SIGNIFICANT CHANGE UP (ref 2–14)
NEUTROPHILS # BLD AUTO: 16.33 K/UL — HIGH (ref 1.8–7.4)
NEUTROPHILS NFR BLD AUTO: 89.6 % — HIGH (ref 43–77)
NRBC # BLD: 4 /100 WBCS — HIGH (ref 0–0)
PLAT MORPH BLD: NORMAL — SIGNIFICANT CHANGE UP
PLATELET # BLD AUTO: 63 K/UL — LOW (ref 150–400)
PLATELET COUNT - ESTIMATE: ABNORMAL
POTASSIUM SERPL-MCNC: 4.3 MMOL/L — SIGNIFICANT CHANGE UP (ref 3.5–5.3)
POTASSIUM SERPL-SCNC: 4.3 MMOL/L — SIGNIFICANT CHANGE UP (ref 3.5–5.3)
PROT SERPL-MCNC: 5 GM/DL — LOW (ref 6–8.3)
PROTHROM AB SERPL-ACNC: 14.2 SEC — HIGH (ref 10–12.9)
RBC # BLD: 3.63 M/UL — LOW (ref 3.8–5.2)
RBC # FLD: 23.8 % — HIGH (ref 10.3–14.5)
RBC BLD AUTO: ABNORMAL
SODIUM SERPL-SCNC: 144 MMOL/L — SIGNIFICANT CHANGE UP (ref 135–145)
WBC # BLD: 18.23 K/UL — HIGH (ref 3.8–10.5)
WBC # FLD AUTO: 18.23 K/UL — HIGH (ref 3.8–10.5)

## 2019-08-01 PROCEDURE — 99223 1ST HOSP IP/OBS HIGH 75: CPT | Mod: AI

## 2019-08-01 PROCEDURE — 71045 X-RAY EXAM CHEST 1 VIEW: CPT | Mod: 26

## 2019-08-01 PROCEDURE — 71275 CT ANGIOGRAPHY CHEST: CPT | Mod: 26

## 2019-08-01 PROCEDURE — 93010 ELECTROCARDIOGRAM REPORT: CPT

## 2019-08-01 PROCEDURE — 99285 EMERGENCY DEPT VISIT HI MDM: CPT

## 2019-08-01 PROCEDURE — 93970 EXTREMITY STUDY: CPT | Mod: 26

## 2019-08-01 RX ORDER — SODIUM CHLORIDE 9 MG/ML
1000 INJECTION INTRAMUSCULAR; INTRAVENOUS; SUBCUTANEOUS ONCE
Refills: 0 | Status: COMPLETED | OUTPATIENT
Start: 2019-08-01 | End: 2019-08-01

## 2019-08-01 RX ORDER — DEXAMETHASONE 0.5 MG/5ML
2 ELIXIR ORAL DAILY
Refills: 0 | Status: DISCONTINUED | OUTPATIENT
Start: 2019-08-01 | End: 2019-08-01

## 2019-08-01 RX ORDER — PIPERACILLIN AND TAZOBACTAM 4; .5 G/20ML; G/20ML
3.38 INJECTION, POWDER, LYOPHILIZED, FOR SOLUTION INTRAVENOUS ONCE
Refills: 0 | Status: COMPLETED | OUTPATIENT
Start: 2019-08-01 | End: 2019-08-01

## 2019-08-01 RX ADMIN — PIPERACILLIN AND TAZOBACTAM 3.38 GRAM(S): 4; .5 INJECTION, POWDER, LYOPHILIZED, FOR SOLUTION INTRAVENOUS at 19:04

## 2019-08-01 RX ADMIN — PIPERACILLIN AND TAZOBACTAM 200 GRAM(S): 4; .5 INJECTION, POWDER, LYOPHILIZED, FOR SOLUTION INTRAVENOUS at 18:11

## 2019-08-01 RX ADMIN — SODIUM CHLORIDE 1000 MILLILITER(S): 9 INJECTION INTRAMUSCULAR; INTRAVENOUS; SUBCUTANEOUS at 17:36

## 2019-08-01 RX ADMIN — SODIUM CHLORIDE 1000 MILLILITER(S): 9 INJECTION INTRAMUSCULAR; INTRAVENOUS; SUBCUTANEOUS at 19:04

## 2019-08-01 RX ADMIN — Medication 100 MILLIGRAM(S): at 22:24

## 2019-08-01 RX ADMIN — SODIUM CHLORIDE 1000 MILLILITER(S): 9 INJECTION INTRAMUSCULAR; INTRAVENOUS; SUBCUTANEOUS at 18:11

## 2019-08-01 RX ADMIN — SODIUM CHLORIDE 1000 MILLILITER(S): 9 INJECTION INTRAMUSCULAR; INTRAVENOUS; SUBCUTANEOUS at 17:00

## 2019-08-01 NOTE — ED ADULT NURSE NOTE - INTERVENTIONS DEFINITIONS
Stretcher in lowest position, wheels locked, appropriate side rails in place/[place] to call system

## 2019-08-01 NOTE — H&P ADULT - NSHPPHYSICALEXAM_GEN_ALL_CORE
Vital Signs Last 24 Hrs  T(C): 36.7 (01 Aug 2019 21:30), Max: 38.1 (01 Aug 2019 13:56)  T(F): 98 (01 Aug 2019 21:30), Max: 100.5 (01 Aug 2019 13:56)  HR: 73 (01 Aug 2019 21:30) (73 - 126)  BP: 143/86 (01 Aug 2019 21:30) (137/79 - 143/86)  BP(mean): --  RR: 16 (01 Aug 2019 21:30) (16 - 22)  SpO2: 99% (01 Aug 2019 21:30) (78% - 99%)    PHYSICAL EXAM:    GENERAL: thin cachectic female appears older than age.  HEAD:  Atraumatic, Normocephalic  EYES: EOMI, PERRLA, conjunctiva and sclera clear  ENMT: No tonsillar erythema, exudates, or enlargement; Moist mucous membranes, No lesions  NECK: Supple, No JVD, Normal thyroid  NERVOUS SYSTEM:  Alert & Oriented X3, Good concentration; Motor Strength 5/5 B/L upper and lower extremities; DTRs 2+ intact and symmetric  CHEST/LUNG: Clear to percussion bilaterally; No rales, rhonchi, wheezing, or rubs  HEART: Regular rate and rhythm; No rubs, or gallops, +S1,S2  ABDOMEN: Soft, Nontender, Nondistended; palpable mass, Bowel sounds present  EXTREMITIES:  2+ Peripheral Pulses, No clubbing, cyanosis, + 2+ edema  LYMPH: No cervical adenopathy  RECTAL: deferred  BREAST: deferred  : deferred  SKIN: No rashes or lesions    IMPROVE VTE Individual Risk Assessment          RISK                                                          Points  [  x] Previous VTE                                                3  [  ] Thrombophilia                                             2  [  ] Lower limb paralysis                                    2        (unable to hold up >15 seconds)    [  x] Current Cancer                                             2         (within 6 months)  [ x ] Immobilization > 24 hrs                              1  [  ] ICU/CCU stay > 24 hours                            1  [ x ] Age > 60                                                    1  IMPROVE VTE Score __7_______

## 2019-08-01 NOTE — H&P ADULT - NSHPSOCIALHISTORY_GEN_ALL_CORE
Single  Lives alone  No history of smoking  No history of alcohol abuse  No history of illegal drug use    Has visiting nurse service  Has Home health aide

## 2019-08-01 NOTE — ED PROVIDER NOTE - MUSCULOSKELETAL, MLM
Spine appears normal, range of motion is not limited, no muscle or joint tenderness nontender to palp bilateral legs

## 2019-08-01 NOTE — H&P ADULT - PROBLEM SELECTOR PLAN 1
admit to medicine  blood cutlures  id consult  will cont zosyn pt doesn't appear septic and no source, not immune compromised currently

## 2019-08-01 NOTE — H&P ADULT - NSHPLABSRESULTS_GEN_ALL_CORE
LABS:                        9.7    18.23 )-----------( 63       ( 01 Aug 2019 16:58 )             32.5     08-01    144  |  112<H>  |  23  ----------------------------<  142<H>  4.3   |  23  |  1.47<H>    Ca    8.6      01 Aug 2019 16:58    TPro  5.0<L>  /  Alb  1.3<L>  /  TBili  0.4  /  DBili  x   /  AST  16  /  ALT  11<L>  /  AlkPhos  205<H>  08-01    PT/INR - ( 01 Aug 2019 16:58 )   PT: 14.2 sec;   INR: 1.26 ratio         PTT - ( 01 Aug 2019 16:58 )  PTT:29.8 sec    CAPILLARY BLOOD GLUCOSE          RADIOLOGY & ADDITIONAL TESTS:    Imaging Personally Reviewed:  [ X] YES  [ ] NO

## 2019-08-01 NOTE — ED ADULT NURSE NOTE - PSH
History of lung biopsy  ~ RLL (05/23/2019)  S/P insertion of IVC (inferior vena caval) filter  ~ 05/21/2019

## 2019-08-01 NOTE — H&P ADULT - NSICDXPASTMEDICALHX_GEN_ALL_CORE_FT
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

## 2019-08-01 NOTE — ED PROVIDER NOTE - CARE PLAN
Principal Discharge DX:	Sepsis  Secondary Diagnosis:	Leukocytosis  Secondary Diagnosis:	Uterine cancer

## 2019-08-01 NOTE — H&P ADULT - NSICDXPASTSURGICALHX_GEN_ALL_CORE_FT
PAST SURGICAL HISTORY:  History of lung biopsy ~ RLL (05/23/2019)    S/P insertion of IVC (inferior vena caval) filter ~ 05/21/2019

## 2019-08-01 NOTE — ED ADULT NURSE NOTE - CHIEF COMPLAINT QUOTE
as per son pt had blood transfusion yesterday and since then her urine is very dark, she is very week, and she has been coughing. history of uterine cancer with metastasis to lung. fever 100.5,  at triage, sat 88%

## 2019-08-01 NOTE — ED PROVIDER NOTE - OBJECTIVE STATEMENT
69 years old female by ems with wife. 69 years old female by ems with son c/o generalized weakness cough, sob, fever for last few days. Pt was at her oncology clinic for blood transfusion and discharged home. Pt has a hx of uterine ca with mets to the abd and lungs currently on chemo therapy only last was two weeks ago. Pt denies recent hx of trauma, headache, dizziness, blurred visions, light sensitivities, focal/distal weakness or numbness, chest pain, nausea, vomiting, abd pain.

## 2019-08-01 NOTE — H&P ADULT - ASSESSMENT
69 year old female, with past history significant for Uterine cancer w/ mets to the lungs, CKD, HTN, DVT comes w/generlized fatigue

## 2019-08-01 NOTE — ED ADULT NURSE NOTE - PMH
Chronic kidney disease    Essential hypertension    Iron deficiency anemia    Leg swelling    Leiomyosarcoma of uterus  ~ Stage IV B  Pulmonary nodules  ~ bilateral  Vaginal bleeding

## 2019-08-01 NOTE — H&P ADULT - PROBLEM SELECTOR PLAN 2
pt w/multiple dvt's w/ivc filter, no sign of pe.  ?progression of dvt, has significant thrombocytopenia that prohibits ac and would be of harm.

## 2019-08-01 NOTE — H&P ADULT - HISTORY OF PRESENT ILLNESS
pt is a 69 year old female, with past history significant for Uterine cancer w/ mets to the lungs, CKD, HTN, DVT originally dx'd 4/19 was on apixaban but then was heaving bleeding and had ivc filter placed and ac stopped.  Comes in w/complain of generalized fatigue.  Pt is on chemo last 2 wks ago, and was at Onc 2 days ago for transfusion of prbc.  Son made pt come to ed as she has been feeling fatigued for about 2 wks now, decreased appetite and about 10lb wt loss over this time.  reports she is on med for appetite but cant recall.  she denies any fevers at home but 1 episode of low grade temp in ed.  no sick contacts. doesnt report to be on decadron which was on her med rec.  pt denies any sob, cp palpitations,n/v/d/c no travels.

## 2019-08-01 NOTE — ED PROVIDER NOTE - PROGRESS NOTE DETAILS
Dr. Villalobos is notified and admit pt. Pt was taking Eliquis and stopped due to bleeding problem couple of weeks ago and a ivc filter was inserted. Pt's platelete is low heparine is contraindicated.

## 2019-08-02 DIAGNOSIS — R65.10 SYSTEMIC INFLAMMATORY RESPONSE SYNDROME (SIRS) OF NON-INFECTIOUS ORIGIN WITHOUT ACUTE ORGAN DYSFUNCTION: ICD-10-CM

## 2019-08-02 DIAGNOSIS — Z51.5 ENCOUNTER FOR PALLIATIVE CARE: ICD-10-CM

## 2019-08-02 DIAGNOSIS — C55 MALIGNANT NEOPLASM OF UTERUS, PART UNSPECIFIED: ICD-10-CM

## 2019-08-02 DIAGNOSIS — R50.9 FEVER, UNSPECIFIED: ICD-10-CM

## 2019-08-02 DIAGNOSIS — N18.9 CHRONIC KIDNEY DISEASE, UNSPECIFIED: ICD-10-CM

## 2019-08-02 DIAGNOSIS — M79.89 OTHER SPECIFIED SOFT TISSUE DISORDERS: ICD-10-CM

## 2019-08-02 DIAGNOSIS — C78.00 SECONDARY MALIGNANT NEOPLASM OF UNSPECIFIED LUNG: ICD-10-CM

## 2019-08-02 DIAGNOSIS — E44.0 MODERATE PROTEIN-CALORIE MALNUTRITION: ICD-10-CM

## 2019-08-02 DIAGNOSIS — R06.82 TACHYPNEA, NOT ELSEWHERE CLASSIFIED: ICD-10-CM

## 2019-08-02 PROCEDURE — 99233 SBSQ HOSP IP/OBS HIGH 50: CPT

## 2019-08-02 PROCEDURE — 99223 1ST HOSP IP/OBS HIGH 75: CPT

## 2019-08-02 RX ORDER — PIPERACILLIN AND TAZOBACTAM 4; .5 G/20ML; G/20ML
3.38 INJECTION, POWDER, LYOPHILIZED, FOR SOLUTION INTRAVENOUS EVERY 8 HOURS
Refills: 0 | Status: DISCONTINUED | OUTPATIENT
Start: 2019-08-02 | End: 2019-08-03

## 2019-08-02 RX ORDER — ONDANSETRON 8 MG/1
4 TABLET, FILM COATED ORAL EVERY 6 HOURS
Refills: 0 | Status: DISCONTINUED | OUTPATIENT
Start: 2019-08-02 | End: 2019-08-04

## 2019-08-02 RX ADMIN — Medication 200 MILLIGRAM(S): at 12:27

## 2019-08-02 RX ADMIN — PIPERACILLIN AND TAZOBACTAM 25 GRAM(S): 4; .5 INJECTION, POWDER, LYOPHILIZED, FOR SOLUTION INTRAVENOUS at 21:27

## 2019-08-02 RX ADMIN — PIPERACILLIN AND TAZOBACTAM 25 GRAM(S): 4; .5 INJECTION, POWDER, LYOPHILIZED, FOR SOLUTION INTRAVENOUS at 15:11

## 2019-08-02 RX ADMIN — Medication 100 MILLIGRAM(S): at 03:04

## 2019-08-02 RX ADMIN — ONDANSETRON 4 MILLIGRAM(S): 8 TABLET, FILM COATED ORAL at 21:27

## 2019-08-02 NOTE — CONSULT NOTE ADULT - SUBJECTIVE AND OBJECTIVE BOX
HPI: 69 year old female PMH Uterine cancer w/ mets to the lungs, CKD, HTN, anemia, thrombocytopenia, DVT s/p IVC filter presents with weakness, poor PO intake and weight loss. Pt is s/p chemo 2 weeks ago and due for another treatment this Monday. Pt admitted for septic work up as her WBC is elevated.     PERTINENT PMH REVIEWED: Yes    PAST MEDICAL & SURGICAL HISTORY:  Leg swelling  Pulmonary nodules: ~ bilateral  Leiomyosarcoma of uterus: ~ Stage IV B  Vaginal bleeding  Iron deficiency anemia  Chronic kidney disease  Essential hypertension  S/P insertion of IVC (inferior vena caval) filter: ~ 05/21/2019  History of lung biopsy: ~ RLL (05/23/2019)      SOCIAL HISTORY:  lives with son who is primary care giver and HCP (Darnell) has another son who is not as involved, a friend helps with transportation                                   Admitted from:  home      Surrogate/HCP/Guardian: Phone#: Darnell Gasca [phone] HCP copy on chart     FAMILY HISTORY:  FH: cancer: ~ niece  Family history of deep vein thrombosis: ~ aunt (on Eliquis)      Baseline ADLs (prior to admission):  Independent with some assistance to ambulate    Allergies    No Known Allergies    Intolerances        Present Symptoms:     Dyspnea: 1  Nausea/Vomiting:  No  Anxiety:   No  Depression:  No  Fatigue: Yes  Loss of appetite: Yes    Pain: denies pain             Character-            Duration-            Effect-            Factors-            Frequency-            Location-            Severity-    Review of Systems: Reviewed              All others negative    MEDICATIONS  (STANDING):  piperacillin/tazobactam IVPB.. 3.375 Gram(s) IV Intermittent every 8 hours    MEDICATIONS  (PRN):  guaiFENesin   Syrup  (Sugar-Free) 200 milliGRAM(s) Oral every 6 hours PRN Cough      PHYSICAL EXAM:    Vital Signs Last 24 Hrs  T(C): 36.8 (02 Aug 2019 11:29), Max: 38 (01 Aug 2019 17:00)  T(F): 98.2 (02 Aug 2019 11:29), Max: 100.4 (01 Aug 2019 17:00)  HR: 110 (02 Aug 2019 11:29) (73 - 110)  BP: 155/80 (02 Aug 2019 11:29) (137/79 - 155/80)  BP(mean): --  RR: 16 (02 Aug 2019 11:29) (16 - 18)  SpO2: 97% (02 Aug 2019 11:29) (78% - 99%)    General: alert  oriented x 4__                  cachexia     Karnofsky: 50-60 %    HEENT:   dry mouth  white patches on tongue     Lungs: tachypnea/labored breathing      CV:   tachycardia    GI:  distended constipation  last BM: 8/1    : normal      MSK: weakness  edema             ambulatory with cane    Skin: normal   no rash    LABS:                        9.7    18.23 )-----------( 63       ( 01 Aug 2019 16:58 )             32.5     08-01    144  |  112<H>  |  23  ----------------------------<  142<H>  4.3   |  23  |  1.47<H>    Ca    8.6      01 Aug 2019 16:58    TPro  5.0<L>  /  Alb  1.3<L>  /  TBili  0.4  /  DBili  x   /  AST  16  /  ALT  11<L>  /  AlkPhos  205<H>  08-01    PT/INR - ( 01 Aug 2019 16:58 )   PT: 14.2 sec;   INR: 1.26 ratio         PTT - ( 01 Aug 2019 16:58 )  PTT:29.8 sec    I&O's Summary    01 Aug 2019 07:01  -  02 Aug 2019 07:00  --------------------------------------------------------  IN: 0 mL / OUT: 600 mL / NET: -600 mL        RADIOLOGY & ADDITIONAL STUDIES:    ADVANCE DIRECTIVES:   DNR NO  Completed on:                     ENE   NO   Completed on:  Living Will  NO   Completed on:
Infectious Diseases - Attending at Dr. Gaines    HPI:  pt is a 69 year old female, with past history significant for Uterine cancer w/ mets to the lungs, CKD, HTN, DVT originally dx'd 4/19 was on apixaban but then was heaving bleeding and had ivc filter placed and ac stopped.  Comes in w/complain of generalized fatigue.  Pt is on chemo last 2 wks ago, and was at Onc 2 days ago for transfusion of prbc.  Son made pt come to ed as she has been feeling fatigued for about 2 wks now, decreased appetite and about 10lb wt loss over this time.  reports she is on med for appetite but cant recall.  she denies any fevers at home but 1 episode of low grade temp in ed.  no sick contacts. doesnt report to be on decadron which was on her med rec.  pt denies any sob, cp palpitations,n/v/d/c no travels. (01 Aug 2019 23:34)      PAST MEDICAL & SURGICAL HISTORY:  Leg swelling  Pulmonary nodules: ~ bilateral  Leiomyosarcoma of uterus: ~ Stage IV B  Vaginal bleeding  Iron deficiency anemia  Chronic kidney disease  Essential hypertension  S/P insertion of IVC (inferior vena caval) filter: ~ 05/21/2019  History of lung biopsy: ~ RLL (05/23/2019)      Allergies    No Known Allergies    Intolerances        FAMILY HISTORY:  FH: cancer: ~ niece  Family history of deep vein thrombosis: ~ aunt (on Eliquis)      SOCIAL HISTORY: non smoker    REVIEW OF SYSTEMS:    Constitutional:+ fever, weight loss or fatigue  Eyes: No eye pain, visual disturbances, or discharge  ENT:  No difficulty hearing, tinnitus, vertigo; No sinus or throat pain  Neck: No pain or stiffness  Respiratory: + dry cough,No  wheezing, chills or hemoptysis  Cardiovascular: No chest pain, palpitations, + shortness of breath, dizziness or leg swelling  Gastrointestinal: No abdominal or epigastric pain. No nausea, vomiting or hematemesis; No diarrhea or constipation. No melena or hematochezia.  Genitourinary:+ dark urine ,  No dysuria, frequency, hematuria or incontinence  Neurological: No headaches, memory loss, loss of strength, numbness or tremors  Skin: No itching, burning, rashes or lesions       MEDICATIONS  (STANDING):  piperacillin/tazobactam IVPB.. 3.375 Gram(s) IV Intermittent every 8 hours    MEDICATIONS  (PRN):  guaiFENesin   Syrup  (Sugar-Free) 200 milliGRAM(s) Oral every 6 hours PRN Cough      Vital Signs Last 24 Hrs  T(C): 36.8 (02 Aug 2019 11:29), Max: 38 (01 Aug 2019 17:00)  T(F): 98.2 (02 Aug 2019 11:29), Max: 100.4 (01 Aug 2019 17:00)  HR: 110 (02 Aug 2019 11:29) (73 - 110)  BP: 155/80 (02 Aug 2019 11:29) (137/79 - 155/80)  BP(mean): --  RR: 16 (02 Aug 2019 11:29) (16 - 18)  SpO2: 97% (02 Aug 2019 11:29) (78% - 99%)    PHYSICAL EXAM:    Constitutional: NAD, thin built  HEENT: PERRLA, EOMI, Normal Hearing, MMM  Neck: No LAD, No JVD  Back: Normal spine flexure, No CVA tenderness  Respiratory: CTAB/L  Cardiovascular: S1 and S2, RRR, no M/G/R  Gastrointestinal: BS+, soft, NT/ND  Extremities: No peripheral edema  Vascular: 2+ peripheral pulses  Neurological: A/O x 3, no focal deficits  Skin: No rashes  Right SCV [id number] ,no tenderness ,erythema etc    LABS:                        9.7    18.23 )-----------( 63       ( 01 Aug 2019 16:58 )             32.5     08-01    144  |  112<H>  |  23  ----------------------------<  142<H>  4.3   |  23  |  1.47<H>    Ca    8.6      01 Aug 2019 16:58    TPro  5.0<L>  /  Alb  1.3<L>  /  TBili  0.4  /  DBili  x   /  AST  16  /  ALT  11<L>  /  AlkPhos  205<H>  08-01    PT/INR - ( 01 Aug 2019 16:58 )   PT: 14.2 sec;   INR: 1.26 ratio         PTT - ( 01 Aug 2019 16:58 )  PTT:29.8 sec      MICROBIOLOGY:  RECENT CULTURES:        RADIOLOGY & ADDITIONAL STUDIES:    CT chest:  IMPRESSION:     No pulmonary embolism to the segmental branches. Limited visualization of   subsegmental branches secondary to respiratory motion.    Overall increase in size and number of numerous bilateral metastatic   pulmonary nodules.     Superimposed pulmonary edema and increasing small bilateral pleural   effusions.    Venous doppler    IMPRESSION:     Positive for deep venous thrombosis in the bilateral lower extremities,   more extensive than on previous study.
Reason for Consultation: Uterine Cancer    HPI: Patient is a 69y Female seen on consultatioin for the evaluation and management of uterine cancer    pt is a 69 year old female, with past history significant for Uterine cancer w/ mets to the lungs, CKD, HTN, DVT originally dx'd 4/19 was on apixaban but then was heaving bleeding and had ivc filter placed and ac stopped.  Comes in w/complain of generalized fatigue.  Pt is on chemo last 2 wks ago, and was at Onc 2 days ago for transfusion of prbc.  Son made pt come to ed as she has been feeling fatigued for about 2 wks now, decreased appetite and about 10lb wt loss over this time.  reports she is on med for appetite but cant recall.  she denies any fevers at home but 1 episode of low grade temp in ed.  no sick contacts. doesnt report to be on decadron which was on her med rec.  pt denies any sob, cp palpitations,n/v/d/c no travels.         PAST MEDICAL & SURGICAL HISTORY:  Leg swelling  Pulmonary nodules: ~ bilateral  Leiomyosarcoma of uterus: ~ Stage IV B  Vaginal bleeding  Iron deficiency anemia  Chronic kidney disease  Essential hypertension  S/P insertion of IVC (inferior vena caval) filter: ~ 05/21/2019  History of lung biopsy: ~ RLL (05/23/2019)      MEDICATIONS  (STANDING):  piperacillin/tazobactam IVPB.. 3.375 Gram(s) IV Intermittent every 8 hours    MEDICATIONS  (PRN):      Allergies    No Known Allergies    Intolerances        SOCIAL HISTORY:    Smoking Status: denies  Alcohol: denies  Marital Status: yes  Occupation: retired    FAMILY HISTORY:  FH: cancer: ~ niece  Family history of deep vein thrombosis: ~ aunt (on Eliquis)      Vital Signs Last 24 Hrs  T(C): 36.4 (02 Aug 2019 05:28), Max: 38.1 (01 Aug 2019 13:56)  T(F): 97.6 (02 Aug 2019 05:28), Max: 100.5 (01 Aug 2019 13:56)  HR: 104 (02 Aug 2019 05:28) (73 - 126)  BP: 140/80 (02 Aug 2019 05:28) (137/79 - 154/80)  BP(mean): --  RR: 16 (02 Aug 2019 05:28) (16 - 22)  SpO2: 94% (02 Aug 2019 05:28) (78% - 99%)    PHYSICAL EXAM:    general - AAO x 3, weak looking  HEENT - No Icterus  CVS - RRR  RS - AE B/L  Abd - soft  Ext - Pulses +        LABS:                        9.7    18.23 )-----------( 63       ( 01 Aug 2019 16:58 )             32.5     08-01    144  |  112<H>  |  23  ----------------------------<  142<H>  4.3   |  23  |  1.47<H>    Ca    8.6      01 Aug 2019 16:58    TPro  5.0<L>  /  Alb  1.3<L>  /  TBili  0.4  /  DBili  x   /  AST  16  /  ALT  11<L>  /  AlkPhos  205<H>  08-01    PT/INR - ( 01 Aug 2019 16:58 )   PT: 14.2 sec;   INR: 1.26 ratio         PTT - ( 01 Aug 2019 16:58 )  PTT:29.8 sec        RADIOLOGY & ADDITIONAL STUDIES:

## 2019-08-02 NOTE — CONSULT NOTE ADULT - PROBLEM SELECTOR RECOMMENDATION 9
- supportive care  - Hydration  - Physical therapy  - consider palliative care to determine goal of care
can be from DVT   has a PORT R/O BSI   New Pneumonia?  UTI   awaiting UA and Urine c./s :pt denies symptoms  R/O BSI : f/u on blood c/s   cont antibiotics
pt actively getting chemo last dose 2 weeks ago and is anticipating continuation of chemo - she does have insight into her process and expressed understanding of her prognosis being quite poor. Approached the notion of treatment being more burden than benefit and she is still processing this all - she is taking it day by day

## 2019-08-02 NOTE — PROGRESS NOTE ADULT - SUBJECTIVE AND OBJECTIVE BOX
CHIEF COMPLAINT/INTERVAL HISTORY:    Patient is a 69y old  Female who presents with a chief complaint of generalized fatigue (02 Aug 2019 09:38)      HPI:  pt is a 69 year old female, with past history significant for Uterine cancer w/ mets to the lungs, CKD, HTN, DVT originally dx'd 4/19 was on apixaban but then was heaving bleeding and had ivc filter placed and ac stopped.  Comes in w/complain of generalized fatigue.  Pt is on chemo last 2 wks ago, and was at Onc 2 days ago for transfusion of prbc.  Son made pt come to ed as she has been feeling fatigued for about 2 wks now, decreased appetite and about 10lb wt loss over this time.  reports she is on med for appetite but cant recall.  she denies any fevers at home but 1 episode of low grade temp in ed.  no sick contacts. doesnt report to be on decadron which was on her med rec.  pt denies any sob, cp palpitations,n/v/d/c no travels. (01 Aug 2019 23:34)    Overnight issues  patient just complains of cough   discussed goals of care with son   paliative care consult arranged   SUBJECTIVE & OBJECTIVE: Pt seen and examined at bedside.   ROS:  CONSTITUTIONAL: No fever, POSITIVE weight loss, and  fatigue  NECK: No pain or stiffness  RESPIRATORY: POSITIVE  cough,no wheezing, chills or hemoptysis; POSITIVE  shortness of breath  CARDIOVASCULAR: No chest pain, palpitations, dizziness, or leg swelling  GASTROINTESTINAL: No abdominal or epigastric pain. No nausea, vomiting, or hematemesis; No diarrhea or constipation. No melena or hematochezia.  GENITOURINARY: No dysuria, frequency, hematuria, or incontinence  NEUROLOGICAL: No headaches, memory loss, loss of strength, numbness, or tremors  SKIN: No itching, burning, rashes, or lesions   ICU Vital Signs Last 24 Hrs  T(C): 36.8 (02 Aug 2019 11:29), Max: 38.1 (01 Aug 2019 13:56)  T(F): 98.2 (02 Aug 2019 11:29), Max: 100.5 (01 Aug 2019 13:56)  HR: 110 (02 Aug 2019 11:29) (73 - 126)  BP: 155/80 (02 Aug 2019 11:29) (137/79 - 155/80)  BP(mean): --  ABP: --  ABP(mean): --  RR: 16 (02 Aug 2019 11:29) (16 - 22)  SpO2: 97% (02 Aug 2019 11:29) (78% - 99%)        MEDICATIONS  (STANDING):  piperacillin/tazobactam IVPB.. 3.375 Gram(s) IV Intermittent every 8 hours    MEDICATIONS  (PRN):  guaiFENesin   Syrup  (Sugar-Free) 200 milliGRAM(s) Oral every 6 hours PRN Cough        PHYSICAL EXAM:    GENERAL: NAD, well-groomed, well-developed  HEAD:  Atraumatic, Normocephalic  EYES: EOMI, PERRLA, conjunctiva and sclera clear  ENMT: Moist mucous membranes  NECK: Supple, No JVD  NERVOUS SYSTEM:  Alert & Oriented X3, Motor Strength 5/5 B/L upper and lower extremities; DTRs 2+ intact and symmetric  CHEST/LUNG: Clear to auscultation bilaterally; No rales, rhonchi, wheezing, or rubs  HEART: Regular rate and rhythm; No murmurs, rubs, or gallops  ABDOMEN: Soft, Nontender, Nondistended; Bowel sounds present  EXTREMITIES:  2+ Peripheral Pulses, No clubbing, cyanosis, or edema    LABS:                        9.7    18.23 )-----------( 63       ( 01 Aug 2019 16:58 )             32.5     08-01    144  |  112<H>  |  23  ----------------------------<  142<H>  4.3   |  23  |  1.47<H>    Ca    8.6      01 Aug 2019 16:58    TPro  5.0<L>  /  Alb  1.3<L>  /  TBili  0.4  /  DBili  x   /  AST  16  /  ALT  11<L>  /  AlkPhos  205<H>  08-01    PT/INR - ( 01 Aug 2019 16:58 )   PT: 14.2 sec;   INR: 1.26 ratio         PTT - ( 01 Aug 2019 16:58 )  PTT:29.8 sec      CAPILLARY BLOOD GLUCOSE          RECENT CULTURES:      RADIOLOGY & ADDITIONAL TESTS:  Imaging Personally Reviewed:  [ ] YES      Consultant(s) Notes Reviewed:  [ ] YES     Care Discussed with [ ] Consultants [X ] Patient [ ] Family  [x ]    [x ]  Other; RN  HEALTH ISSUES - PROBLEM Dx:  Uterine cancer: Uterine cancer  Preventive measure: Preventive measure  Sepsis, due to unspecified organism: Sepsis, due to unspecified organism        DVT/GI ppx  Discussed with pt @ bedside

## 2019-08-02 NOTE — PROGRESS NOTE ADULT - SUBJECTIVE AND OBJECTIVE BOX
HPI:  pt is a 69 year old female, with past history significant for Uterine cancer w/ mets to the lungs, CKD, HTN, DVT originally dx'd 4/19 was on apixaban but then was heaving bleeding and had ivc filter placed and ac stopped.  Comes in w/complain of generalized fatigue.  Pt is on chemo last 2 wks ago, and was at Onc 2 days ago for transfusion of prbc.  Son made pt come to ed as she has been feeling fatigued for about 2 wks now, decreased appetite and about 10lb wt loss over this time.  reports she is on med for appetite but cant recall.  she denies any fevers at home but 1 episode of low grade temp in ed.  no sick contacts. doesnt report to be on decadron which was on her med rec.  pt denies any sob, cp palpitations,n/v/d/c no travels. 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  Vaginal bleeding  Iron deficiency anemia  Chronic kidney disease  Essential hypertension  S/P insertion of IVC (inferior vena caval) filter: ~ 05/21/2019  History of lung biopsy: ~ RLL (05/23/2019)  	                   DIDILITY    OTHER REVIEW OF SYSTEMS:  UNABLE TO OBTAIN  due to: SEDATION, CONFUSION    Baseline ADLs (prior to admission):  Independent [ ] moderately [ ] fully   Dependent   [ ] moderately [ ]fully    	                 T(C): 36.8 (08-02-19 @ 11:29), Max: 38 (08-01-19 @ 17:00)  T(F): 98.2 (08-02-19 @ 11:29), Max: 100.4 (08-01-19 @ 17:00)  HR: 110 (08-02-19 @ 11:29) (73 - 110)  BP: 155/80 (08-02-19 @ 11:29) (137/79 - 155/80)  RR: 16 (08-02-19 @ 11:29) (16 - 18)  SpO2: 97% (08-02-19 @ 11:29) (78% - 99%)  Wt(kg): --      GENERAL:    EYES : non icteric :               Other:     HEENT:      	atraumatic, normocephalic, PEERLA, sclera anicteric, dry oral mucosa   NECK:          Trach:        Vent:  CVS:          Tachypnic:               Bradycardic:              Normal:		   RESP:        tachypnic:                Dyspenic :                  Comfortable:	  GI:          NGT/PEG 	  :      swanson      	  MUSC:       	Normal	Weakness	  Edema	   NEURO:     	No focal deficit	  Focal  PSYCH:           Alert	Oriented	  Lethargic     SKIN:         	Normal	  Other  LYMPH:      	Normal	  Other  	                     ALLERGIES: No Known Allergies    MEDICATIONS  (STANDING):  piperacillin/tazobactam IVPB.. 3.375 Gram(s) IV Intermittent every 8 hours    MEDICATIONS  (PRN):  guaiFENesin   Syrup  (Sugar-Free) 200 milliGRAM(s) Oral every 6 hours PRN Cough    	                   LABS REVIEWED    H/H: 9.7/32.5   08-01 @ 16:58    BUN/CR: 23/1.47  08-01 @ 16:58    Albumin: 1.3  08-01 @ 16:58    CRP/SED RATE: --/-- 08-01 @ 16:58    Sodium: 144  08-01 @ 16:58                  	  ADVANCED DIRECTIVES:   FULL CODE [   ]  DNR [  ]    DNI [  ]     MOLST [  ]  PSYCHOSOCIAL-SPIRITUAL ASSESSMENT:       Reviewed       GOALS OF CARE DISCUSSION       Palliative care info/counseling provided	           Family meeting       Advanced Directives addressed	           See previous Palliative Medicine Note  	        REFERRALS	        Palliative Med        Unit SW/Case Mgmt              Speech/Swallow        Hospice             Nutrition         Functional Assessment: KPS:     20          PPS: v poor      FINAL IMPRESSION AND RECOMMENDATIONS: overall prognosis care poor     needs hospice

## 2019-08-02 NOTE — CONSULT NOTE ADULT - PROBLEM SELECTOR RECOMMENDATION 3
can consider low dose opiates for dyspnea should she become distressed  she appears to be comfortable with rapid breathing at present but if exerted would potentially benefit from premedication (perhaps prior to PT)

## 2019-08-02 NOTE — CONSULT NOTE ADULT - PROBLEM SELECTOR RECOMMENDATION 7
met with patient and son Darnell who is her proxy and care giver. They both expressed understanding of pt's overall poor prognosis and current medical treatment plan while hospitalized. After my exam, Darnell asked to speak outside of the room and he inquired about hospice and when would it be that time. I gave him a brief introduction to hospice care and what it entails and notified him that it would not include chemotherapy but more focus on symptom management and quality of life knowing that time is limited. I explained that she is indeed a hospice candidate based on her diagnosis and prognosis and at this point would be home hospice due to her clinical need and lack of symptom burden. Darnell stated that he understood that time was limited but wanted to have more discussions with his mother regarding her goals of care. I encouraged him to explore her wishes further but not to pressurize it and offered to help with continued discussions.

## 2019-08-02 NOTE — CONSULT NOTE ADULT - ASSESSMENT
Uterine Cancer
69 year old female with metastatic Uterine Ca (to lungs) currently on chemo admitted for weakness and failure to thrive
69 yr old female seen with

## 2019-08-02 NOTE — CONSULT NOTE ADULT - NSPROBSELRECBLANK_6_GEN
Aide with patient/comfortable appearance/cooperative
cooperative/comfortable appearance
DISPLAY PLAN FREE TEXT

## 2019-08-02 NOTE — CONSULT NOTE ADULT - PROBLEM SELECTOR RECOMMENDATION 2
with lung and liver metsnot on chemoradiation recently   has aPORT  r/o BSI with lung and liver mets, not on chemoradiation recently   has a PORT  r/o BSI

## 2019-08-02 NOTE — CONSULT NOTE ADULT - ATTENDING COMMENTS
Thank you for the courtesy of this consult, will continue to follow with you.   Recommend/Consider: appetite stimulant, oral care, nystatin swish and swallow and low dose morphine (0.5mg IV q 4 PRN dyspnea).

## 2019-08-02 NOTE — CONSULT NOTE ADULT - PROBLEM SELECTOR RECOMMENDATION 3
DVT ,acute on h/o chronic DVT)  more extensive in B/L LE   has IVC fiolter  CTchest : NO PE   cant be on AC sec to bleeding hisotry in recent past   may be causing fever DVT ,acute on h/o chronic DVT)  more extensive in B/L LE   has IVC filter  CTchest : NO PE   cant be on AC sec to bleeding hisotry in recent past   may be causing fever

## 2019-08-03 LAB
ANION GAP SERPL CALC-SCNC: 9 MMOL/L — SIGNIFICANT CHANGE UP (ref 5–17)
APPEARANCE UR: ABNORMAL
BACTERIA # UR AUTO: ABNORMAL
BILIRUB UR-MCNC: NEGATIVE — SIGNIFICANT CHANGE UP
BUN SERPL-MCNC: 25 MG/DL — HIGH (ref 7–23)
CALCIUM SERPL-MCNC: 8.5 MG/DL — SIGNIFICANT CHANGE UP (ref 8.5–10.1)
CHLORIDE SERPL-SCNC: 113 MMOL/L — HIGH (ref 96–108)
CO2 SERPL-SCNC: 23 MMOL/L — SIGNIFICANT CHANGE UP (ref 22–31)
COLOR SPEC: YELLOW — SIGNIFICANT CHANGE UP
CREAT SERPL-MCNC: 1.59 MG/DL — HIGH (ref 0.5–1.3)
DIFF PNL FLD: ABNORMAL
EPI CELLS # UR: SIGNIFICANT CHANGE UP
GLUCOSE SERPL-MCNC: 116 MG/DL — HIGH (ref 70–99)
GLUCOSE UR QL: NEGATIVE MG/DL — SIGNIFICANT CHANGE UP
HCT VFR BLD CALC: 31.7 % — LOW (ref 34.5–45)
HGB BLD-MCNC: 9.3 G/DL — LOW (ref 11.5–15.5)
KETONES UR-MCNC: ABNORMAL
LEUKOCYTE ESTERASE UR-ACNC: ABNORMAL
MCHC RBC-ENTMCNC: 27.2 PG — SIGNIFICANT CHANGE UP (ref 27–34)
MCHC RBC-ENTMCNC: 29.3 GM/DL — LOW (ref 32–36)
MCV RBC AUTO: 92.7 FL — SIGNIFICANT CHANGE UP (ref 80–100)
NITRITE UR-MCNC: NEGATIVE — SIGNIFICANT CHANGE UP
NRBC # BLD: 2 /100 WBCS — HIGH (ref 0–0)
PH UR: 5 — SIGNIFICANT CHANGE UP (ref 5–8)
PLATELET # BLD AUTO: 78 K/UL — LOW (ref 150–400)
POTASSIUM SERPL-MCNC: 4.3 MMOL/L — SIGNIFICANT CHANGE UP (ref 3.5–5.3)
POTASSIUM SERPL-SCNC: 4.3 MMOL/L — SIGNIFICANT CHANGE UP (ref 3.5–5.3)
PROT UR-MCNC: 100 MG/DL
RBC # BLD: 3.42 M/UL — LOW (ref 3.8–5.2)
RBC # FLD: 25.6 % — HIGH (ref 10.3–14.5)
RBC CASTS # UR COMP ASSIST: >50 /HPF (ref 0–4)
SODIUM SERPL-SCNC: 145 MMOL/L — SIGNIFICANT CHANGE UP (ref 135–145)
SP GR SPEC: 1.01 — SIGNIFICANT CHANGE UP (ref 1.01–1.02)
UROBILINOGEN FLD QL: NEGATIVE MG/DL — SIGNIFICANT CHANGE UP
WBC # BLD: 24.61 K/UL — HIGH (ref 3.8–10.5)
WBC # FLD AUTO: 24.61 K/UL — HIGH (ref 3.8–10.5)
WBC UR QL: >50

## 2019-08-03 PROCEDURE — 99232 SBSQ HOSP IP/OBS MODERATE 35: CPT

## 2019-08-03 RX ORDER — CEFTRIAXONE 500 MG/1
1000 INJECTION, POWDER, FOR SOLUTION INTRAMUSCULAR; INTRAVENOUS EVERY 24 HOURS
Refills: 0 | Status: DISCONTINUED | OUTPATIENT
Start: 2019-08-03 | End: 2019-08-05

## 2019-08-03 RX ADMIN — PIPERACILLIN AND TAZOBACTAM 25 GRAM(S): 4; .5 INJECTION, POWDER, LYOPHILIZED, FOR SOLUTION INTRAVENOUS at 13:16

## 2019-08-03 RX ADMIN — PIPERACILLIN AND TAZOBACTAM 25 GRAM(S): 4; .5 INJECTION, POWDER, LYOPHILIZED, FOR SOLUTION INTRAVENOUS at 05:38

## 2019-08-03 RX ADMIN — CEFTRIAXONE 100 MILLIGRAM(S): 500 INJECTION, POWDER, FOR SOLUTION INTRAMUSCULAR; INTRAVENOUS at 17:15

## 2019-08-03 NOTE — PROGRESS NOTE ADULT - PROBLEM SELECTOR PLAN 1
- continue supportive care  - seen by palliative  - patient wants to follow up with primary Oncologist as outpatient

## 2019-08-03 NOTE — CHART NOTE - NSCHARTNOTEFT_GEN_A_CORE
Upon Nutritional Assessment by the Registered Dietitian your patient was determined to meet criteria / has evidence of the following diagnosis/diagnoses:          [ ]  Mild Protein Calorie Malnutrition        [ ]  Moderate Protein Calorie Malnutrition        [x ] Severe Protein Calorie Malnutrition        [ ] Unspecified Protein Calorie Malnutrition        [ ] Underweight / BMI <19        [ ] Morbid Obesity / BMI > 40      Findings as based on:  •  Comprehensive nutrition assessment and consultation  •  Calorie counts (nutrient intake analysis)  •  Food acceptance and intake status from observations by staff  •  Follow up  •  Patient education  •  Intervention secondary to interdisciplinary rounds  •   concerns      Treatment:    The following diet has been recommended:  Low sodium , Ensure Clear 8 oz 3x/day (720 al, 24 gm pro) , No Carb Prosource 1 pkg daily=60 calories, 15 grams protein per pkg       PROVIDER Section:     By signing this assessment you are acknowledging and agree with the diagnosis/diagnoses assigned by the Registered Dietitian    Comments:

## 2019-08-03 NOTE — DIETITIAN INITIAL EVALUATION ADULT. - PERTINENT MEDS FT
MEDICATIONS  (STANDING):  piperacillin/tazobactam IVPB.. 3.375 Gram(s) IV Intermittent every 8 hours    MEDICATIONS  (PRN):  guaiFENesin   Syrup  (Sugar-Free) 200 milliGRAM(s) Oral every 6 hours PRN Cough  ondansetron Injectable 4 milliGRAM(s) IV Push every 6 hours PRN Nausea and/or Vomiting

## 2019-08-03 NOTE — PROGRESS NOTE ADULT - SUBJECTIVE AND OBJECTIVE BOX
INTERVAL HPI/OVERNIGHT EVENTS:  Pt seen and examined at bedside.     Allergies/Intolerance: No Known Allergies      MEDICATIONS  (STANDING):  piperacillin/tazobactam IVPB.. 3.375 Gram(s) IV Intermittent every 8 hours    MEDICATIONS  (PRN):  guaiFENesin   Syrup  (Sugar-Free) 200 milliGRAM(s) Oral every 6 hours PRN Cough  ondansetron Injectable 4 milliGRAM(s) IV Push every 6 hours PRN Nausea and/or Vomiting        ROS: all systems reviewed and wnl      PHYSICAL EXAMINATION:  Vital Signs Last 24 Hrs  T(C): 37.7 (03 Aug 2019 05:32), Max: 37.7 (03 Aug 2019 05:32)  T(F): 99.8 (03 Aug 2019 05:32), Max: 99.8 (03 Aug 2019 05:32)  HR: 97 (03 Aug 2019 05:32) (96 - 116)  BP: 136/80 (03 Aug 2019 05:32) (130/76 - 155/80)  BP(mean): --  RR: 18 (03 Aug 2019 05:32) (16 - 18)  SpO2: 95% (03 Aug 2019 05:32) (93% - 97%)  CAPILLARY BLOOD GLUCOSE          08-02 @ 07:01  -  08-03 @ 07:00  --------------------------------------------------------  IN: 240 mL / OUT: 0 mL / NET: 240 mL        GENERAL:   NECK: supple, No JVD  CHEST/LUNG: clear to auscultation bilaterally; no rales, rhonchi, or wheezing b/l  HEART: normal S1, S2  ABDOMEN: BS+, soft, ND, NT   EXTREMITIES:  pulses palpable; no clubbing, cyanosis, or edema b/l LEs  SKIN: no rashes or lesions      LABS:                        9.3    24.61 )-----------( 78       ( 03 Aug 2019 07:49 )             31.7     08-03    145  |  113<H>  |  25<H>  ----------------------------<  116<H>  4.3   |  23  |  1.59<H>    Ca    8.5      03 Aug 2019 07:49    TPro  5.0<L>  /  Alb  1.3<L>  /  TBili  0.4  /  DBili  x   /  AST  16  /  ALT  11<L>  /  AlkPhos  205<H>  08-01    PT/INR - ( 01 Aug 2019 16:58 )   PT: 14.2 sec;   INR: 1.26 ratio         PTT - ( 01 Aug 2019 16:58 )  PTT:29.8 sec INTERVAL HPI/OVERNIGHT EVENTS:  Pt seen and examined at bedside.     Allergies/Intolerance: No Known Allergies      MEDICATIONS  (STANDING):  piperacillin/tazobactam IVPB.. 3.375 Gram(s) IV Intermittent every 8 hours    MEDICATIONS  (PRN):  guaiFENesin   Syrup  (Sugar-Free) 200 milliGRAM(s) Oral every 6 hours PRN Cough  ondansetron Injectable 4 milliGRAM(s) IV Push every 6 hours PRN Nausea and/or Vomiting        ROS: all systems reviewed and wnl      PHYSICAL EXAMINATION:  Vital Signs Last 24 Hrs  T(C): 37.7 (03 Aug 2019 05:32), Max: 37.7 (03 Aug 2019 05:32)  T(F): 99.8 (03 Aug 2019 05:32), Max: 99.8 (03 Aug 2019 05:32)  HR: 97 (03 Aug 2019 05:32) (96 - 116)  BP: 136/80 (03 Aug 2019 05:32) (130/76 - 155/80)  BP(mean): --  RR: 18 (03 Aug 2019 05:32) (16 - 18)  SpO2: 95% (03 Aug 2019 05:32) (93% - 97%)  CAPILLARY BLOOD GLUCOSE          08-02 @ 07:01  -  08-03 @ 07:00  --------------------------------------------------------  IN: 240 mL / OUT: 0 mL / NET: 240 mL        GENERAL: stable in bed, no new complaints  NECK: supple, No JVD  CHEST/LUNG: clear to auscultation bilaterally; no rales, rhonchi, or wheezing b/l  HEART: normal S1, S2  ABDOMEN: BS+, soft, ND, NT   EXTREMITIES:  pulses palpable; no clubbing, cyanosis, or edema b/l LEs  SKIN: no rashes or lesions      LABS:                        9.3    24.61 )-----------( 78       ( 03 Aug 2019 07:49 )             31.7     08-03    145  |  113<H>  |  25<H>  ----------------------------<  116<H>  4.3   |  23  |  1.59<H>    Ca    8.5      03 Aug 2019 07:49    TPro  5.0<L>  /  Alb  1.3<L>  /  TBili  0.4  /  DBili  x   /  AST  16  /  ALT  11<L>  /  AlkPhos  205<H>  08-01    PT/INR - ( 01 Aug 2019 16:58 )   PT: 14.2 sec;   INR: 1.26 ratio         PTT - ( 01 Aug 2019 16:58 )  PTT:29.8 sec

## 2019-08-03 NOTE — DIETITIAN INITIAL EVALUATION ADULT. - DIET TYPE
+ Ensure Clear 8 oz 3x/day (720 al, 24 gm pro) , No Carb Prosource 1 pkg daily=60 calories, 15 grams protein per pkg/low sodium

## 2019-08-03 NOTE — DIETITIAN INITIAL EVALUATION ADULT. - PERTINENT LABORATORY DATA
08-03   Hgb 9.3 g/dL<L> Hct 31.7 %<L>  08-03 Na145 mmol/L Glu 116 mg/dL<H> K+ 4.3 mmol/L Cr  1.59 mg/dL<H> BUN 25 mg/dL<H> 08-01 Alb 1.3 g/dL<L>08-01 ALT 11 U/L<L> AST 16 U/L Alkaline Phosphatase 205 U/L<H>  06-01 HbA1/GC 6.0 % <H, pre-diabetic range>

## 2019-08-03 NOTE — DIETITIAN INITIAL EVALUATION ADULT. - OTHER INFO
Pt's son was @ beside, pt appeared weak & unable to give detailed diet hx. Son lived c pt and assisted c shopping/cooking.  Diet PTA: unrestricted, pt was eating small amounts of food @ home, son was providing Ensure Shake, protein rich foods PTA.

## 2019-08-03 NOTE — PROGRESS NOTE ADULT - SUBJECTIVE AND OBJECTIVE BOX
lying in bed     Vital Signs Last 24 Hrs  T(C): 37.7 (03 Aug 2019 05:32), Max: 37.7 (03 Aug 2019 05:32)  T(F): 99.8 (03 Aug 2019 05:32), Max: 99.8 (03 Aug 2019 05:32)  HR: 97 (03 Aug 2019 05:32) (96 - 116)  BP: 136/80 (03 Aug 2019 05:32) (130/76 - 155/80)  BP(mean): --  RR: 18 (03 Aug 2019 05:32) (16 - 18)  SpO2: 95% (03 Aug 2019 05:32) (93% - 97%)    PHYSICAL EXAM:    general - weak looking  HEENT - No Icterus  CVS - RRR  RS - AE B/L  Abd - soft, NT  Ext - Pulses +        LABS:                        9.3    24.61 )-----------( 78       ( 03 Aug 2019 07:49 )             31.7     08-03    145  |  113<H>  |  25<H>  ----------------------------<  116<H>  4.3   |  23  |  1.59<H>    Ca    8.5      03 Aug 2019 07:49    TPro  5.0<L>  /  Alb  1.3<L>  /  TBili  0.4  /  DBili  x   /  AST  16  /  ALT  11<L>  /  AlkPhos  205<H>  08-01    PT/INR - ( 01 Aug 2019 16:58 )   PT: 14.2 sec;   INR: 1.26 ratio         PTT - ( 01 Aug 2019 16:58 )  PTT:29.8 sec      Culture - Blood (collected 01 Aug 2019 22:50)  Source: .Blood  Preliminary Report (02 Aug 2019 23:01):    No growth to date.    Culture - Blood (collected 01 Aug 2019 22:50)  Source: .Blood  Preliminary Report (02 Aug 2019 23:01):    No growth to date.        RADIOLOGY & ADDITIONAL STUDIES:

## 2019-08-03 NOTE — PROGRESS NOTE ADULT - ASSESSMENT
69 year old female, with past history significant for Uterine cancer w/ mets to the lungs, CKD, HTN, DVT comes w/generlized fatigue 69 year old female, with past history significant for Uterine cancer w/ mets to the lungs, CKD, HTN, DVT comes w/generlized fatigue    Note: BMI of 18.3 consistent with severe protein calorie malnutrition. 69 year old female, with past history significant for Uterine cancer w/ mets to the lungs, CKD, HTN, DVT comes w/generlized fatigue    Note: BMI of 18.3 consistent with severe protein calorie malnutrition.     Discussed with son, will decide by Monday about home hospice. No blood work on Sunday.

## 2019-08-03 NOTE — DIETITIAN INITIAL EVALUATION ADULT. - ETIOLOGY
inadequate protein-energy intake in setting of leiomyosarcoma of uterus, CKD inadequate protein-energy intake in setting of leiomyosarcoma of uterus, CKD, pressure ulcer

## 2019-08-03 NOTE — DIETITIAN INITIAL EVALUATION ADULT. - PHYSICAL APPEARANCE
BMI=18.3(08/01/19), 3+ edema of feet, ankles noted/underweight/debilitated/emaciated/other (specify) Nutrition focused physical exam conducted; Subcutaneous fat Exam;  [ Moderate  ]  Orbital fat pads region,  [ Moderate  ]Buccal fat region,  [Severe   ]triceps region, [ WNL  ]ribs region.  Muscle Exam; [ Mild to Moderate   ]temples region, [ Severe  ]clavicle region, [ Severe  ]shoulder region, [ Severe  ]Scapula region, [ Moderate   ]Interosseous region, [ Unable  ]thigh region, [ Unable , edema noted  ]Calf region

## 2019-08-03 NOTE — DIETITIAN INITIAL EVALUATION ADULT. - ENERGY NEEDS
Height (cm): 165.1 (08-01)  Weight (kg): 49.9 (08-01)  BMI (kg/m2): 18.3 (08-01)  IBW:  56.6 kg         % IBW:   84%          UBW: 58.9 kg             %UBW: 84%, ? dry wt. due to edema

## 2019-08-04 PROBLEM — C49.9 LEIOMYOSARCOMA: Status: ACTIVE | Noted: 2019-05-08

## 2019-08-04 PROBLEM — D21.9 FIBROIDS: Status: ACTIVE | Noted: 2019-05-08

## 2019-08-04 LAB
CULTURE RESULTS: NO GROWTH — SIGNIFICANT CHANGE UP
SPECIMEN SOURCE: SIGNIFICANT CHANGE UP

## 2019-08-04 PROCEDURE — 99232 SBSQ HOSP IP/OBS MODERATE 35: CPT

## 2019-08-04 RX ORDER — ONDANSETRON 8 MG/1
4 TABLET, FILM COATED ORAL EVERY 6 HOURS
Refills: 0 | Status: DISCONTINUED | OUTPATIENT
Start: 2019-08-04 | End: 2019-08-05

## 2019-08-04 RX ORDER — MORPHINE SULFATE 50 MG/1
2 CAPSULE, EXTENDED RELEASE ORAL ONCE
Refills: 0 | Status: DISCONTINUED | OUTPATIENT
Start: 2019-08-04 | End: 2019-08-04

## 2019-08-04 RX ADMIN — MORPHINE SULFATE 2 MILLIGRAM(S): 50 CAPSULE, EXTENDED RELEASE ORAL at 06:49

## 2019-08-04 RX ADMIN — MORPHINE SULFATE 2 MILLIGRAM(S): 50 CAPSULE, EXTENDED RELEASE ORAL at 07:05

## 2019-08-04 NOTE — PROGRESS NOTE ADULT - ASSESSMENT
69 year old female, with past history significant for Uterine cancer w/ mets to the lungs, CKD, HTN, DVT comes w/generlized fatigue    Note: BMI of 18.3 consistent with severe protein calorie malnutrition.     Discussed with son, will decide by Monday about home hospice. No blood work on Sunday.  DNR/DNI was discussed with son.     Home hospice is being considered, will rediscuss with palliative care service on Monday.

## 2019-08-04 NOTE — PROGRESS NOTE ADULT - SUBJECTIVE AND OBJECTIVE BOX
lying in bed	    Vital Signs Last 24 Hrs  T(C): 35.6 (04 Aug 2019 11:05), Max: 36.8 (04 Aug 2019 00:34)  T(F): 96 (04 Aug 2019 11:05), Max: 98.2 (04 Aug 2019 00:34)  HR: 94 (04 Aug 2019 11:05) (89 - 108)  BP: 116/71 (04 Aug 2019 11:05) (116/71 - 134/88)  BP(mean): --  RR: 16 (04 Aug 2019 11:05) (16 - 18)  SpO2: 99% (04 Aug 2019 11:05) (95% - 100%)    PHYSICAL EXAM:    general - weak looking  HEENT - No Icterus  CVS - RRR  RS - AE B/L  Abd - soft, NT  Ext - Pulses +        LABS:                        9.3    24.61 )-----------( 78       ( 03 Aug 2019 07:49 )             31.7     08-03    145  |  113<H>  |  25<H>  ----------------------------<  116<H>  4.3   |  23  |  1.59<H>    Ca    8.5      03 Aug 2019 07:49        Urinalysis Basic - ( 03 Aug 2019 13:29 )    Color: Yellow / Appearance: Slightly Turbid / S.015 / pH: x  Gluc: x / Ketone: Trace  / Bili: Negative / Urobili: Negative mg/dL   Blood: x / Protein: 100 mg/dL / Nitrite: Negative   Leuk Esterase: Moderate / RBC: >50 /HPF / WBC >50   Sq Epi: x / Non Sq Epi: Few / Bacteria: Many        Culture - Blood (collected 01 Aug 2019 22:50)  Source: .Blood  Preliminary Report (02 Aug 2019 23:01):    No growth to date.    Culture - Blood (collected 01 Aug 2019 22:50)  Source: .Blood  Preliminary Report (02 Aug 2019 23:01):    No growth to date.        RADIOLOGY & ADDITIONAL STUDIES:

## 2019-08-04 NOTE — PROGRESS NOTE ADULT - SUBJECTIVE AND OBJECTIVE BOX
INTERVAL HPI/OVERNIGHT EVENTS:  Pt seen and examined at bedside.     Allergies/Intolerance: No Known Allergies      MEDICATIONS  (STANDING):  cefTRIAXone   IVPB 1000 milliGRAM(s) IV Intermittent every 24 hours    MEDICATIONS  (PRN):  ondansetron Injectable 4 milliGRAM(s) IV Push every 6 hours PRN Nausea and/or Vomiting        ROS: all systems reviewed and wnl      PHYSICAL EXAMINATION:  Vital Signs Last 24 Hrs  T(C): 36.2 (04 Aug 2019 05:36), Max: 37.1 (03 Aug 2019 11:00)  T(F): 97.1 (04 Aug 2019 05:36), Max: 98.8 (03 Aug 2019 11:00)  HR: 108 (04 Aug 2019 05:36) (89 - 108)  BP: 118/68 (04 Aug 2019 05:36) (118/68 - 134/88)  BP(mean): --  RR: 17 (04 Aug 2019 05:36) (17 - 18)  SpO2: 100% (04 Aug 2019 05:36) (93% - 100%)  CAPILLARY BLOOD GLUCOSE           @ 07:01  -  04 @ 07:00  --------------------------------------------------------  IN: 720 mL / OUT: 300 mL / NET: 420 mL        GENERAL:   NECK: supple, No JVD  CHEST/LUNG: clear to auscultation bilaterally; no rales, rhonchi, or wheezing b/l  HEART: normal S1, S2  ABDOMEN: BS+, soft, ND, NT   EXTREMITIES:  pulses palpable; no clubbing, cyanosis, or edema b/l LEs  SKIN: no rashes or lesions      LABS:                        9.3    24.61 )-----------( 78       ( 03 Aug 2019 07:49 )             31.7     08-    145  |  113<H>  |  25<H>  ----------------------------<  116<H>  4.3   |  23  |  1.59<H>    Ca    8.5      03 Aug 2019 07:49        Urinalysis Basic - ( 03 Aug 2019 13:29 )    Color: Yellow / Appearance: Slightly Turbid / S.015 / pH: x  Gluc: x / Ketone: Trace  / Bili: Negative / Urobili: Negative mg/dL   Blood: x / Protein: 100 mg/dL / Nitrite: Negative   Leuk Esterase: Moderate / RBC: >50 /HPF / WBC >50   Sq Epi: x / Non Sq Epi: Few / Bacteria: Many INTERVAL HPI/OVERNIGHT EVENTS:  Pt seen and examined at bedside.     Allergies/Intolerance: No Known Allergies      MEDICATIONS  (STANDING):  cefTRIAXone   IVPB 1000 milliGRAM(s) IV Intermittent every 24 hours    MEDICATIONS  (PRN):  ondansetron Injectable 4 milliGRAM(s) IV Push every 6 hours PRN Nausea and/or Vomiting        ROS: all systems reviewed and wnl      PHYSICAL EXAMINATION:  Vital Signs Last 24 Hrs  T(C): 36.2 (04 Aug 2019 05:36), Max: 37.1 (03 Aug 2019 11:00)  T(F): 97.1 (04 Aug 2019 05:36), Max: 98.8 (03 Aug 2019 11:00)  HR: 108 (04 Aug 2019 05:36) (89 - 108)  BP: 118/68 (04 Aug 2019 05:36) (118/68 - 134/88)  BP(mean): --  RR: 17 (04 Aug 2019 05:36) (17 - 18)  SpO2: 100% (04 Aug 2019 05:36) (93% - 100%)  CAPILLARY BLOOD GLUCOSE           @ 07:01  -  04 @ 07:00  --------------------------------------------------------  IN: 720 mL / OUT: 300 mL / NET: 420 mL        GENERAL: stable, son at bedside, no fevers, poor oral intake  NECK: supple, No JVD  CHEST/LUNG: clear to auscultation bilaterally; no rales, rhonchi, or wheezing b/l  HEART: normal S1, S2  ABDOMEN: BS+, soft, ND, NT   EXTREMITIES:  pulses palpable; no clubbing, cyanosis, mild edema b/l LEs  SKIN: no rashes or lesions      LABS:                        9.3    24.61 )-----------( 78       ( 03 Aug 2019 07:49 )             31.7         145  |  113<H>  |  25<H>  ----------------------------<  116<H>  4.3   |  23  |  1.59<H>    Ca    8.5      03 Aug 2019 07:49        Urinalysis Basic - ( 03 Aug 2019 13:29 )    Color: Yellow / Appearance: Slightly Turbid / S.015 / pH: x  Gluc: x / Ketone: Trace  / Bili: Negative / Urobili: Negative mg/dL   Blood: x / Protein: 100 mg/dL / Nitrite: Negative   Leuk Esterase: Moderate / RBC: >50 /HPF / WBC >50   Sq Epi: x / Non Sq Epi: Few / Bacteria: Many

## 2019-08-04 NOTE — PROGRESS NOTE ADULT - PROBLEM SELECTOR PLAN 1
- continue supportive care  - seen by palliative  - patient wants to follow up with primary Oncologist as outpatient  - physical therapy  - prognosis guarded

## 2019-08-04 NOTE — HISTORY OF PRESENT ILLNESS
[FreeTextEntry1] : \par Ms. Gasca (69 years old, , and PMB beginning in ) presents today for follow up.  She was initally a referral from [facility] where she was admitted from 19-19 with an acute extensive left lower extremity DVT; and found to have an enlarging uterus which ultimately led to a diagnosis of metastatic uterine leiomyosarcoma.  Most recently  she was also diagnosed with a RLE DVT on 19.\par \par Ms. 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  \par \par Diagnostic workup:\par \par 19:  TVS - Markedly enlarged heterogenous uterus 18.5 x 12.4 x 14 cm.  Bilateral ovaries WNL.  Debris vs. nodularity along the right urinary bladder wall.\par \par 19:  MRI - enlarged uterus with multiple masses which may represent fibroids vs. leiomyosarcoma.\par \par 19:  CT A/P:  markedly enlarged heterogenous uterus 17.7 x 14.8 x 14.5 cm.  Ovaries are not well visualized.  Multiple pulmonary nodules and masses measuring up to 6.3 x 4.5 cm in the right lower lobe.  Assymetric enlargement of the left external iliac and common femoral veins compatible with known DVT.  No lymphadenopathy. \par 19:  Extensive left lower extremity deep vein thrombosis above and below the knee.  \par \par GYN:  Dr. Stan Saleh - [phone]\par PCP:  Dr. Parisi [phone]\par \par Interval history:\par \par \par \par \par

## 2019-08-05 ENCOUNTER — TRANSCRIPTION ENCOUNTER (OUTPATIENT)
Age: 69
End: 2019-08-05

## 2019-08-05 ENCOUNTER — APPOINTMENT (OUTPATIENT)
Dept: GYNECOLOGIC ONCOLOGY | Facility: CLINIC | Age: 69
End: 2019-08-05

## 2019-08-05 VITALS
DIASTOLIC BLOOD PRESSURE: 67 MMHG | RESPIRATION RATE: 15 BRPM | OXYGEN SATURATION: 97 % | SYSTOLIC BLOOD PRESSURE: 135 MMHG | HEART RATE: 94 BPM | TEMPERATURE: 98 F

## 2019-08-05 DIAGNOSIS — C55 MALIGNANT NEOPLASM OF UTERUS, PART UNSPECIFIED: ICD-10-CM

## 2019-08-05 DIAGNOSIS — D21.9 BENIGN NEOPLASM OF CONNECTIVE AND OTHER SOFT TISSUE, UNSPECIFIED: ICD-10-CM

## 2019-08-05 DIAGNOSIS — D72.829 ELEVATED WHITE BLOOD CELL COUNT, UNSPECIFIED: ICD-10-CM

## 2019-08-05 DIAGNOSIS — C49.9 MALIGNANT NEOPLASM OF CONNECTIVE AND SOFT TISSUE, UNSPECIFIED: ICD-10-CM

## 2019-08-05 PROCEDURE — 99233 SBSQ HOSP IP/OBS HIGH 50: CPT

## 2019-08-05 PROCEDURE — 99239 HOSP IP/OBS DSCHRG MGMT >30: CPT

## 2019-08-05 PROCEDURE — 99231 SBSQ HOSP IP/OBS SF/LOW 25: CPT

## 2019-08-05 RX ORDER — FLUCONAZOLE 150 MG/1
200 TABLET ORAL ONCE
Refills: 0 | Status: DISCONTINUED | OUTPATIENT
Start: 2019-08-05 | End: 2019-08-05

## 2019-08-05 RX ORDER — LOPERAMIDE HCL 2 MG
1 TABLET ORAL
Qty: 0 | Refills: 0 | DISCHARGE

## 2019-08-05 RX ORDER — SALIVA SUBSTITUTE COMB NO.11 351 MG
5 POWDER IN PACKET (EA) MUCOUS MEMBRANE
Qty: 0 | Refills: 0 | DISCHARGE
Start: 2019-08-05

## 2019-08-05 RX ORDER — ONDANSETRON 8 MG/1
1 TABLET, FILM COATED ORAL
Qty: 0 | Refills: 0 | DISCHARGE

## 2019-08-05 RX ORDER — MORPHINE SULFATE 50 MG/1
2 CAPSULE, EXTENDED RELEASE ORAL EVERY 6 HOURS
Refills: 0 | Status: DISCONTINUED | OUTPATIENT
Start: 2019-08-05 | End: 2019-08-05

## 2019-08-05 RX ORDER — NYSTATIN 500MM UNIT
500000 POWDER (EA) MISCELLANEOUS
Refills: 0 | Status: DISCONTINUED | OUTPATIENT
Start: 2019-08-05 | End: 2019-08-05

## 2019-08-05 RX ORDER — FLUCONAZOLE 150 MG/1
100 TABLET ORAL ONCE
Refills: 0 | Status: COMPLETED | OUTPATIENT
Start: 2019-08-05 | End: 2019-08-05

## 2019-08-05 RX ORDER — DEXAMETHASONE 0.5 MG/5ML
0 ELIXIR ORAL
Qty: 0 | Refills: 0 | DISCHARGE

## 2019-08-05 RX ORDER — NYSTATIN 500MM UNIT
5 POWDER (EA) MISCELLANEOUS
Qty: 0 | Refills: 0 | DISCHARGE
Start: 2019-08-05

## 2019-08-05 RX ORDER — MORPHINE SULFATE 50 MG/1
0.1 CAPSULE, EXTENDED RELEASE ORAL
Qty: 0 | Refills: 0 | DISCHARGE
Start: 2019-08-05

## 2019-08-05 RX ORDER — SALIVA SUBSTITUTE COMB NO.11 351 MG
5 POWDER IN PACKET (EA) MUCOUS MEMBRANE
Refills: 0 | Status: DISCONTINUED | OUTPATIENT
Start: 2019-08-05 | End: 2019-08-05

## 2019-08-05 RX ADMIN — Medication 500000 UNIT(S): at 11:44

## 2019-08-05 RX ADMIN — Medication 5 MILLILITER(S): at 11:44

## 2019-08-05 RX ADMIN — FLUCONAZOLE 100 MILLIGRAM(S): 150 TABLET ORAL at 11:44

## 2019-08-05 NOTE — PROGRESS NOTE ADULT - PROBLEM SELECTOR PLAN 1
pt expressed understanding of her diagnosis and prognosis and would like to follow up with her oncologist to see what treatment options are available she is not yet ready to stop pursuing disease directed therapy

## 2019-08-05 NOTE — PROGRESS NOTE ADULT - SUBJECTIVE AND OBJECTIVE BOX
OVERNIGHT EVENTS: none     BRIEF HOSPITAL COURSE:69 year old female PMH Uterine cancer w/ mets to the lungs, CKD, HTN, anemia, thrombocytopenia, DVT s/p IVC filter presents with weakness, poor PO intake and weight loss. Pt is s/p chemo 2 weeks ago and due for another treatment this Monday. Pt admitted for septic work up as her WBC is elevated. All cultures are negative, abx were discontinued.    Present Symptoms:     Dyspnea: 0   Nausea/Vomiting: No  Anxiety:  No  Depression: No  Fatigue: Yes  Loss of appetite: Yes    Pain: denies             Character-            Duration-            Effect-            Factors-            Frequency-            Location-            Severity-    Review of Systems: Reviewed                     Negative: denies SOB                     Positive: dry mouth, poor taste, vaginal discharge/bleeding    All others negative    MEDICATIONS  (STANDING):    MEDICATIONS  (PRN):      PHYSICAL EXAM:    Vital Signs Last 24 Hrs  T(C): 35.6 (05 Aug 2019 05:48), Max: 35.6 (04 Aug 2019 11:05)  T(F): 96 (05 Aug 2019 05:48), Max: 96 (04 Aug 2019 11:05)  HR: 92 (05 Aug 2019 05:48) (89 - 94)  BP: 114/64 (05 Aug 2019 05:48) (108/65 - 116/71)  BP(mean): --  RR: 18 (05 Aug 2019 05:48) (16 - 19)  SpO2: 98% (05 Aug 2019 05:48) (98% - 100%)    General: alert  oriented x __4__                   cachexia      Karnofsky: 40 %    HEENT:  dry mouth  white patches on tongue    Lungs: tachypnea    CV: normal      GI: softly distended   no BS  constipation  last BM: 8/3    : normal      MSK:   weakness  edema             ambulatory with assist    Skin: intact    LABS:              Urinalysis Basic - ( 03 Aug 2019 13:29 )    Color: Yellow / Appearance: Slightly Turbid / S.015 / pH: x  Gluc: x / Ketone: Trace  / Bili: Negative / Urobili: Negative mg/dL   Blood: x / Protein: 100 mg/dL / Nitrite: Negative   Leuk Esterase: Moderate / RBC: >50 /HPF / WBC >50   Sq Epi: x / Non Sq Epi: Few / Bacteria: Many      I&O's Summary      RADIOLOGY & ADDITIONAL STUDIES: reviewed     ADVANCE DIRECTIVES:   DNR NO  Completed on:                     MOLST  NO   Completed on:  Living Will   NO   Completed on:

## 2019-08-05 NOTE — PROGRESS NOTE ADULT - PROBLEM SELECTOR PLAN 2
pt w/multiple dvt's w/ivc filter, no sign of pe. Likely progression of dvt, has significant thrombocytopenia that prohibits ac and would be of harm.
Chemotherapy as Out-Pt.
tachypneic at rest but not distressed   using small amount of oxygen
pt w/multiple dvt's w/ivc filter, no sign of pe.  ?progression of dvt, has significant thrombocytopenia that prohibits ac and would be of harm.
with lung and liver mets  likely responsible for above  fever resolved
pt w/multiple dvt's w/ivc filter, no sign of pe. Likely progression of dvt, has significant thrombocytopenia that prohibits ac and would be of harm.
pt w/multiple dvt's w/ivc filter, no sign of pe. Likely progression of dvt, has significant thrombocytopenia that prohibits ac and would be of harm.

## 2019-08-05 NOTE — PROGRESS NOTE ADULT - PROBLEM SELECTOR PROBLEM 6
Leiomyosarcoma of uterus
SIRS (systemic inflammatory response syndrome)
Leiomyosarcoma of uterus

## 2019-08-05 NOTE — PROGRESS NOTE ADULT - PROBLEM SELECTOR PLAN 3
elevate LE, likely from component of dvt's/venous stasis/ low albumin/mlnutrition
will need to see if exacerbated by exertion, may benefit from low dose opioid to take prior to activity
elevate le  likely from component of dvt's/venous stasis/ low albumin/mlnutrition
elevate LE, likely from component of dvt's/venous stasis/ low albumin/mlnutrition
elevate LE, likely from component of dvt's/venous stasis/ low albumin/mlnutrition

## 2019-08-05 NOTE — DISCHARGE NOTE PROVIDER - NSDCCPCAREPLAN_GEN_ALL_CORE_FT
PRINCIPAL DISCHARGE DIAGNOSIS  Diagnosis: Sepsis  Assessment and Plan of Treatment: STABLE, COMPLETED ANTIBIOTICS.      SECONDARY DISCHARGE DIAGNOSES  Diagnosis: Uterine cancer  Assessment and Plan of Treatment: STABLE.    Diagnosis: Leukocytosis  Assessment and Plan of Treatment:

## 2019-08-05 NOTE — PROGRESS NOTE ADULT - REASON FOR ADMISSION
generalized fatigue

## 2019-08-05 NOTE — PHYSICAL THERAPY INITIAL EVALUATION ADULT - GENERAL OBSERVATIONS, REHAB EVAL
Pt was seen in sitting c supplemental O2 at 2l/min  through nasal cannula, swelling in felipa LE, alert and oriented to name, place and situation. Pt was able to follow all command. Son present.

## 2019-08-05 NOTE — PROGRESS NOTE ADULT - PROBLEM SELECTOR PLAN 5
Urine cx pending.
can start appetite stim  add nutritional supplements ie. Raphael
blood culture  urine cx  zosyn
Urine cx negative.
Urine cx pending.

## 2019-08-05 NOTE — PROGRESS NOTE ADULT - SUBJECTIVE AND OBJECTIVE BOX
INTERVAL HPI/OVERNIGHT EVENTS:  Pt seen and examined at bedside.     Allergies/Intolerance: No Known Allergies      MEDICATIONS  (STANDING):  cefTRIAXone   IVPB 1000 milliGRAM(s) IV Intermittent every 24 hours    MEDICATIONS  (PRN):  ondansetron   Disintegrating Tablet 4 milliGRAM(s) Oral every 6 hours PRN Nausea and/or Vomiting        ROS: all systems reviewed and wnl      PHYSICAL EXAMINATION:  Vital Signs Last 24 Hrs  T(C): 35.6 (05 Aug 2019 05:48), Max: 35.6 (04 Aug 2019 11:05)  T(F): 96 (05 Aug 2019 05:48), Max: 96 (04 Aug 2019 11:05)  HR: 92 (05 Aug 2019 05:48) (89 - 94)  BP: 114/64 (05 Aug 2019 05:48) (108/65 - 116/71)  BP(mean): --  RR: 18 (05 Aug 2019 05:48) (16 - 19)  SpO2: 98% (05 Aug 2019 05:48) (98% - 100%)  CAPILLARY BLOOD GLUCOSE            GENERAL:   NECK: supple, No JVD  CHEST/LUNG: clear to auscultation bilaterally; no rales, rhonchi, or wheezing b/l  HEART: normal S1, S2  ABDOMEN: BS+, soft, ND, NT   EXTREMITIES:  pulses palpable; no clubbing, cyanosis, or edema b/l LEs  SKIN: no rashes or lesions      LABS:            Urinalysis Basic - ( 03 Aug 2019 13:29 )    Color: Yellow / Appearance: Slightly Turbid / S.015 / pH: x  Gluc: x / Ketone: Trace  / Bili: Negative / Urobili: Negative mg/dL   Blood: x / Protein: 100 mg/dL / Nitrite: Negative   Leuk Esterase: Moderate / RBC: >50 /HPF / WBC >50   Sq Epi: x / Non Sq Epi: Few / Bacteria: Many INTERVAL HPI/OVERNIGHT EVENTS:  Pt seen and examined at bedside.     Allergies/Intolerance: No Known Allergies      MEDICATIONS  (STANDING):  cefTRIAXone   IVPB 1000 milliGRAM(s) IV Intermittent every 24 hours    MEDICATIONS  (PRN):  ondansetron   Disintegrating Tablet 4 milliGRAM(s) Oral every 6 hours PRN Nausea and/or Vomiting        ROS: all systems reviewed and wnl      PHYSICAL EXAMINATION:  Vital Signs Last 24 Hrs  T(C): 35.6 (05 Aug 2019 05:48), Max: 35.6 (04 Aug 2019 11:05)  T(F): 96 (05 Aug 2019 05:48), Max: 96 (04 Aug 2019 11:05)  HR: 92 (05 Aug 2019 05:48) (89 - 94)  BP: 114/64 (05 Aug 2019 05:48) (108/65 - 116/71)  BP(mean): --  RR: 18 (05 Aug 2019 05:48) (16 - 19)  SpO2: 98% (05 Aug 2019 05:48) (98% - 100%)  CAPILLARY BLOOD GLUCOSE            GENERAL: in bed, frail, poor po intake, no fevers or SOB  NECK: supple, No JVD  CHEST/LUNG: clear to auscultation bilaterally; no rales, rhonchi, or wheezing b/l  HEART: normal S1, S2  ABDOMEN: BS+, soft, ND, NT   EXTREMITIES:  pulses palpable; no clubbing, cyanosis, or edema b/l LEs  SKIN: no rashes or lesions      LABS:            Urinalysis Basic - ( 03 Aug 2019 13:29 )    Color: Yellow / Appearance: Slightly Turbid / S.015 / pH: x  Gluc: x / Ketone: Trace  / Bili: Negative / Urobili: Negative mg/dL   Blood: x / Protein: 100 mg/dL / Nitrite: Negative   Leuk Esterase: Moderate / RBC: >50 /HPF / WBC >50   Sq Epi: x / Non Sq Epi: Few / Bacteria: Many

## 2019-08-05 NOTE — PHYSICAL THERAPY INITIAL EVALUATION ADULT - TINETTI GAIT TEST, REHAB EVAL
Indication of gait -0/1,   Step Length and height -1/2,   Foot Clearance -2/2,   Step Symmetry - 1/1,  Step Continuity -0/1,   Path -1/ 2,   Trunk -1/2,   Walking Time -1/1,   Total Score - 6/12

## 2019-08-05 NOTE — PROGRESS NOTE ADULT - PROBLEM SELECTOR PLAN 9
encourage ambulation w/assistance  pt consult

## 2019-08-05 NOTE — PROGRESS NOTE ADULT - PROBLEM SELECTOR PROBLEM 3
Bilateral lower extremity edema
Tachypnea
Bilateral lower extremity edema

## 2019-08-05 NOTE — PROGRESS NOTE ADULT - PROBLEM SELECTOR PLAN 6
on chemo next round monday, heme onc consult appreciated  palliateive care consult in [place].
on chemo next round monday, heme onc consult appreciated  palliateive care consult in [place].
sepsis ruled out  abx off  will treat for thrush
on chemo next round monday  heme onc consult appreciated  palliateive care consult called in
on chemo next round monday, heme onc consult appreciated  palliateive care consult in [place].

## 2019-08-05 NOTE — PHYSICAL THERAPY INITIAL EVALUATION ADULT - ADDITIONAL COMMENTS
There is 4 steps c felipa rails,  close and able to be reached simultaneously, at the entry of the house and no stairs to negotiate at home. Son, Darnell assist in ADL as needed. Self

## 2019-08-05 NOTE — PHYSICAL THERAPY INITIAL EVALUATION ADULT - CRITERIA FOR SKILLED THERAPEUTIC INTERVENTIONS
functional limitations in following categories/risk reduction/prevention/predicted duration of therapy intervention/therapy frequency/rehab potential/impairments found/anticipated discharge recommendation

## 2019-08-05 NOTE — PROGRESS NOTE ADULT - PROBLEM SELECTOR PROBLEM 5
Leukocytosis, unspecified type
Moderate protein-calorie malnutrition
Leukocytosis, unspecified type

## 2019-08-05 NOTE — PROGRESS NOTE ADULT - PROBLEM SELECTOR PLAN 7
s/p recent transfusion, follow cbc
s/p recent transfusion, follow cbc
pt wants to continue to pursue disease directed therapies   approached the notion of MOLST with her and son at bedside.
s/p recent transfusion  follow cbc
s/p recent transfusion, follow cbc

## 2019-08-05 NOTE — PROGRESS NOTE ADULT - PROBLEM SELECTOR PROBLEM 2
Leiomyosarcoma of uterus
Lung metastasis
Hypercoagulable state
Leiomyosarcoma of uterus
Hypercoagulable state

## 2019-08-05 NOTE — PHYSICAL THERAPY INITIAL EVALUATION ADULT - TINETTI BALANCE TEST, REHAB EVAL
Sitting Balance - 1/1 , Rises From Chair -1 /2, Attempts to rise - 0/2 , Immediate Standing Balance -1 /2,  Standing Balance - 1/2, Nudged - 1 /2, Eyes Closed -1 /1,  Turning 360 Deg - 0 /2, Sitting down - 1/2, Balance Score -7 /16

## 2019-08-05 NOTE — PROGRESS NOTE ADULT - PROVIDER SPECIALTY LIST ADULT
Heme/Onc
Hospitalist
Infectious Disease
Palliative Care
Palliative Care

## 2019-08-05 NOTE — PROGRESS NOTE ADULT - SUBJECTIVE AND OBJECTIVE BOX
Patient is a 69y old  Female who presents with a chief complaint of generalized fatigue (05 Aug 2019 10:01)      INTERVAL HPI / OVERNIGHT EVENTS: no fever,weakness+    MEDICATIONS  (STANDING):  Biotene Dry Mouth Oral Rinse 5 milliLiter(s) Swish and Spit five times a day  nystatin    Suspension 321827 Unit(s) Oral four times a day    MEDICATIONS  (PRN):  morphine Concentrate 2 milliGRAM(s) Oral every 6 hours PRN dyspnea/prior to activity      Vital Signs Last 24 Hrs  T(C): 35.6 (05 Aug 2019 05:48), Max: 35.6 (04 Aug 2019 23:44)  T(F): 96 (05 Aug 2019 05:48), Max: 96 (04 Aug 2019 23:44)  HR: 89 (05 Aug 2019 11:50) (89 - 98)  BP: 128/76 (05 Aug 2019 11:50) (108/65 - 128/77)  BP(mean): --  RR: 16 (05 Aug 2019 11:50) (16 - 19)  SpO2: 98% (05 Aug 2019 11:50) (98% - 100%)    Review of systems:  General : no fever /chills,+fatigue  CVS : no chest pain, palpitations  Lungs : no shortness of breath, cough  GI : no abdominal pain,vomiting, diarrhea   : no dysuria,hematuria        PHYSICAL EXAM:  General :NAD  Constitutional:  thin built  Respiratory: CTAB/L  Cardiovascular: S1 and S2, RRR, no M/G/R  Gastrointestinal: BS+, soft, NT/ND  Extremities: No peripheral edema  Vascular: 2+ peripheral pulses  Skin: No rashes  Port a Cath+      LABS:                MICROBIOLOGY:  RECENT CULTURES:  08-03 .Urine XXXX XXXX   No growth    08-01 .Blood XXXX XXXX   No growth to date.          RADIOLOGY & ADDITIONAL STUDIES:

## 2019-08-05 NOTE — PHYSICAL THERAPY INITIAL EVALUATION ADULT - GAIT DEVIATIONS NOTED, PT EVAL
decreased weight-shifting ability/decreased stride length/increased time in double stance/decreased velocity of limb motion/decreased sherron/decreased step length

## 2019-08-05 NOTE — DISCHARGE NOTE NURSING/CASE MANAGEMENT/SOCIAL WORK - NSDCPNDISPN_GEN_ALL_CORE
Side effects of pain management treatment/Education provided on the pain management plan of care/Safe use, storage and disposal of opioids when prescribed/Activities of daily living, including home environment that might     exacerbate pain or reduce effectiveness of the pain management plan of care as well as strategies to address these issues

## 2019-08-05 NOTE — PHYSICAL THERAPY INITIAL EVALUATION ADULT - TRANSFER TRAINING, PT EVAL
Pt will independently perform sit to stand to sit transfers without LOB using rolling walker by 6 weeks.

## 2019-08-05 NOTE — CHART NOTE - NSCHARTNOTESELECT_GEN_ALL_CORE
"    [facility]  [address] [facility] [place] [address]  Phone: [phone]  Fax: [phone]                  Maris Bustamante   2017 9:20 AM   Office Visit    Description:  Male : 1977   Provider:  Hardy Cummins MD   Department:  [facility]           Reason for Visit     Executive Health           Diagnoses this Visit        Comments    Annual physical exam    -  Primary            To Do List           Goals (5 Years of Data)     None      Follow-Up and Disposition     Return in about 1 year (around 2018) for Annual Exam.      [facility]     [facility] Care [facility] -  Assistance  Registered nurses in the [facility] provide clinical advisement, health education, appointment booking, and other advisory services.  Call for this free service at [phone].             Medications           Message regarding Medications     Verify the changes and/or additions to your medication regime listed below are the same as discussed with your clinician today.  If any of these changes or additions are incorrect, please notify your healthcare provider.             Verify that the below list of medications is an accurate representation of the medications you are currently taking.  If none reported, the list may be blank. If incorrect, please contact your healthcare provider. Carry this list with you in case of emergency.           Current Medications     albuterol (PROAIR HFA) 90 mcg/actuation inhaler Inhale 1 puff into the lungs daily as needed for Wheezing.    mometasone (NASONEX) 50 mcg/actuation nasal spray 2 sprays by Nasal route as needed.           Clinical Reference Information           Vital Signs - Last Recorded  Most recent update: 2017  9:20 AM by Brianna Marsh LPN    BP Pulse Temp Resp Ht Wt    (!) 100/54 64 97 °F (36.1 °C) (Tympanic) 16 5' 10" (1.778 m) 78.8 kg (173 lb 11.6 oz)    BMI                24.93 kg/m2     "      Blood Pressure          Most Recent Value    BP  (!)  100/54      Allergies as of 1/13/2017     Egg Derived      Immunizations Administered on Date of Encounter - 1/13/2017     None       Event Note

## 2019-08-05 NOTE — DISCHARGE NOTE PROVIDER - HOSPITAL COURSE
pt is a 69 year old female, with past history significant for Uterine cancer w/ mets to the lungs, CKD, HTN, DVT originally dx'd 4/19 was on apixaban but then was heaving bleeding and had ivc filter placed and ac stopped.  Comes in w/complain of generalized fatigue.  Pt is on chemo last 2 wks ago, and was at Onc 2 days ago for transfusion of prbc.  Son made pt come to ed as she has been feeling fatigued for about 2 wks now, decreased appetite and about 10lb wt loss over this time.  reports she is on med for appetite but cant recall.  she denies any fevers at home but 1 episode of low grade temp in ed.  no sick contacts. doesnt report to be on decadron which was on her med rec.  pt denies any sob, cp palpitations,n/v/d/c no travels.     Note: BMI of 18.3 consistent with severe protein calorie malnutrition.     Discussed with son at bedside today. DNR/DNI was discussed with son.  Home hospice is being considered, but for now will like home PT and discuss further treatment options with     her oncologist. Performance status is poor.         Problem: Sepsis, due to unspecified organism.  Plan: Blood cutlures negative, Stop Zosyn. Stop IV Rocephin, urine culture and blood culture all negatie.     Problem: Hypercoagulable state.  Plan: pt w/multiple dvt's w/ivc filter, no sign of pe. Likely progression of dvt, has significant thrombocytopenia that prohibits ac and would be of harm.     Problem: Bilateral lower extremity edema.  Plan: elevate LE, likely from component of dvt's/venous stasis/ low albumin/mlnutrition.     Problem: Difficulty walking.  Plan: Pt consult, D/C to STR    Problem: Leukocytosis, unspecified type.  Plan: Urine cx negative.     Problem: Leiomyosarcoma of uterus. Plan: on chemo next round monday, heme onc consult appreciated    palliative care consult done family not interested in now.    Problem: Iron deficiency anemia, unspecified iron deficiency anemia type.  Plan: s/p recent transfusion, follow cbc.     Problem: Essential hypertension.  Plan: hold bp meds for now.     Problem: Preventive measure.  Plan: encourage ambulation w/assistance    Patient is stable to discharge to STR.

## 2019-08-05 NOTE — PROGRESS NOTE ADULT - PROBLEM SELECTOR PROBLEM 1
Lung metastasis
Uterine cancer
Leukocytosis, unspecified type
Malignant neoplasm of uterus, unspecified site
Malignant neoplasm of uterus, unspecified site
Sepsis, due to unspecified organism

## 2019-08-05 NOTE — GOALS OF CARE CONVERSATION - PERSONAL ADVANCE DIRECTIVE - CONVERSATION DETAILS
met with patient and son on 8/2 and again on 8/5, both instances pt expressed understanding that her prognosis is quite poor and that she has limited treatment options but would like to continue to pursue treatment. Explained what MOLST entails and elaborated on each decision point with her son at bedside who also participated in exploring patient's goals. MOLST was filled out with patient. She would like a trial of CPR, trial of mechanical ventilatory support, trial of temporary feeding tube. Explained to patient that she may change her goal at any point in time and that her current medical condition and overall clinical state is such that she may not survive a resuscitation effort, she expressed understanding. Information on hospice briefly touched upon.

## 2019-08-05 NOTE — PHYSICAL THERAPY INITIAL EVALUATION ADULT - BALANCE TRAINING, PT EVAL
Pt will increase static/dynamic sitting balance to good and static/dynamic standing balance to good to perform all functional mobility c a Rolling Walker, under supervision, without LOB, by 6 weeks.

## 2019-08-05 NOTE — PROGRESS NOTE ADULT - SUBJECTIVE AND OBJECTIVE BOX
INTERVAL History:  dyspnea,  Cough.  Leg Swelling    Allergies    No Known Allergies    Intolerances        MEDICATIONS  (STANDING):    MEDICATIONS  (PRN):      Vital Signs Last 24 Hrs  T(C): 35.6 (05 Aug 2019 05:48), Max: 35.6 (04 Aug 2019 11:05)  T(F): 96 (05 Aug 2019 05:48), Max: 96 (04 Aug 2019 11:05)  HR: 92 (05 Aug 2019 05:48) (89 - 94)  BP: 114/64 (05 Aug 2019 05:48) (108/65 - 116/71)  BP(mean): --  RR: 18 (05 Aug 2019 05:48) (16 - 19)  SpO2: 98% (05 Aug 2019 05:48) (98% - 100%)    PHYSICAL EXAM:      EYES: EOMI, PERRLA, conjunctiva and sclera clear  NECK: Supple, No JVD, Normal thyroid  CHEST/LUNG: Clear to percussion bilaterally; No rales, rhonchi,   HEART: Regular rate and rhythm;   ABDOMEN:   Distended.  Edema:- ++        LABS:            Urinalysis Basic - ( 03 Aug 2019 13:29 )    Color: Yellow / Appearance: Slightly Turbid / S.015 / pH: x  Gluc: x / Ketone: Trace  / Bili: Negative / Urobili: Negative mg/dL   Blood: x / Protein: 100 mg/dL / Nitrite: Negative   Leuk Esterase: Moderate / RBC: >50 /HPF / WBC >50   Sq Epi: x / Non Sq Epi: Few / Bacteria: Many          RADIOLOGY & ADDITIONAL STUDIES:    PATHOLOGY:  Cytopathology - Non Gyn Report (19 @ 20:31)    Cytopathology - Non Gyn Report:   ACCESSION No:  [id number]    PATRICIA LEUNG                  3        Cytopathology Addendum Report          Addendum  Sarcoma, NOS (see note).    Note:  Immunohistochemical staining of the malignant cells show  strong positivity for Vimentin.  All other markers are negative  (CD99, Desmin, CD10, Bcl-2, AE1/AE3, EMA and S-100).  Clinical,  pathologic, and radiologic correlation is essential.    This case was reviewed at the daily Cytopathology Quality  Assurance conference.    Dr. Burns was notified of the diagnosis on 19 11:04.    Verified by: Rosangela Flowers M.D.  (Electronic Signature)  Reported on: 19 11:07 EDT, [address] [place] [address]  [id number]  _________________________________________________________________      Cytopathology Report            Final Diagnosis  LUNG, RIGHT LOWER LOBE, CT GUIDED CORE BIOPSY AND FNA  POSITIVE FOR MALIGNANT CELLS.  Malignant neoplasm (see comment).    Comment: The cytology slides, cell block, and biopsy show  discohesive groupsand single malignant cells with macrophages  and mesothelial cells in the background.  The biopsy shows sheets  of pleomorphic rounded to spindled nuclei with hyperchromatic,  irregular nuclei and no discernable cytoplasm.  Multinucleation  is seen.    Immunohistochemical staining for p40 and TTF1 are negative.    Additional stains are pending to further characterize the tumor  and will be reported in an addendum.    Dr. Muhammad and Dr. Montes De Oca was informed of the diagnosis via  encrypted email on 19 11:11.                PATRICIA LEUNG        Cytopathology Report          Slide(s) with built in immunohistochemical study control(s) and  negative control associated with this case has/have been verified  by the sign out pathologist.  For slide(s) without built in controls positive control slides  has/have been reviewed and approved by immunohistochemistry lab  These immunohistochemical/ in-situ hybridization tests have been  developed and their performance characteristics determined by  [facility], Department of Pathology,  Division of Immunopathology, [address] [place] [address].  It has not been cleared or approved by the U.S. Food  and Drug Administration.  The FDA has determined that such  clearance or approval is not necessary.  This test is used for  clinical purposes.  The laboratory is certified under the CLIA-88  as qualified to perform high complexity clinical  testing.    Screened by: Mauricio ESPINOZA([facility])  Verified by: Rosangela Flowers M.D.  (Electronic Signature)  Reported on: 19 11:22 EDT, [address] [place]  [id number]  Cytology technical processing performed at [address] [place] [id number]  _________________________________________________________________      Specimen(s) Submitted  LUNG, RIGHT LOWER LOBE, CT GUIDED CORE BIOPSY AND FNA      Statement of Adequacy  Immediate cytologic study for adequacy of specimen using a Diff-  Quik stain was performed at [facility], [address] [place] [address], [place], [place]. FNA and  touch imprints acceptable for further evaluation by [facility]      Clinical Information  Pelvic mass; right lung nodule.      GrossDescription  Core Biopsy:  Tissue collected: Specimen container has been inspected to  confirm patient’s name and date of birth, contains 3              PATRICIA LEUNG                  3        Cytopathology Report          tan cores measuring 1.0, 0.5, 0.3 cm in length. Entire specimen  submitted in 1 cassette labeled 1B.    4 Touch prep slides ( 2 air dried + 2 fixed)    FNA:  Received: 5 air dried and 5 fixed slides  Needle rinses in 20 ml formalin  Submitted: cell block in one cassette labeled 1A    Prepared:  4 Touch Prep, 5 Diff Quick,  5 Pap Stained slides  1 cell block  labeled 1A  1 core biopsy labeled 1B  16 Total slides

## 2019-08-05 NOTE — PROGRESS NOTE ADULT - ASSESSMENT
69 year old female with metastatic Uterine Ca (to lungs) currently on chemo admitted for weakness and failure to thrive

## 2019-08-05 NOTE — PROGRESS NOTE ADULT - PROBLEM SELECTOR PLAN 1
no clear infectious source found   pt was running elevated WBC in last admission as well  possibly leukemoid reaction sec to cancer  all c/s neg

## 2019-08-05 NOTE — PHYSICAL THERAPY INITIAL EVALUATION ADULT - GAIT TRAINING, PT EVAL
Pt will ambulate 60 feet with rolling walker, under supervision, without loss of balance, by 6 weeks.

## 2019-08-06 LAB
CULTURE RESULTS: SIGNIFICANT CHANGE UP
CULTURE RESULTS: SIGNIFICANT CHANGE UP
SPECIMEN SOURCE: SIGNIFICANT CHANGE UP
SPECIMEN SOURCE: SIGNIFICANT CHANGE UP

## 2019-08-14 DIAGNOSIS — C78.00 SECONDARY MALIGNANT NEOPLASM OF UNSPECIFIED LUNG: ICD-10-CM

## 2019-08-14 DIAGNOSIS — Z95.828 PRESENCE OF OTHER VASCULAR IMPLANTS AND GRAFTS: ICD-10-CM

## 2019-08-14 DIAGNOSIS — Z92.21 PERSONAL HISTORY OF ANTINEOPLASTIC CHEMOTHERAPY: ICD-10-CM

## 2019-08-14 DIAGNOSIS — R53.83 OTHER FATIGUE: ICD-10-CM

## 2019-08-14 DIAGNOSIS — D69.6 THROMBOCYTOPENIA, UNSPECIFIED: ICD-10-CM

## 2019-08-14 DIAGNOSIS — C55 MALIGNANT NEOPLASM OF UTERUS, PART UNSPECIFIED: ICD-10-CM

## 2019-08-14 DIAGNOSIS — R60.0 LOCALIZED EDEMA: ICD-10-CM

## 2019-08-14 DIAGNOSIS — N18.9 CHRONIC KIDNEY DISEASE, UNSPECIFIED: ICD-10-CM

## 2019-08-14 DIAGNOSIS — I82.403 ACUTE EMBOLISM AND THROMBOSIS OF UNSPECIFIED DEEP VEINS OF LOWER EXTREMITY, BILATERAL: ICD-10-CM

## 2019-08-14 DIAGNOSIS — I12.9 HYPERTENSIVE CHRONIC KIDNEY DISEASE WITH STAGE 1 THROUGH STAGE 4 CHRONIC KIDNEY DISEASE, OR UNSPECIFIED CHRONIC KIDNEY DISEASE: ICD-10-CM

## 2019-08-14 DIAGNOSIS — D68.59 OTHER PRIMARY THROMBOPHILIA: ICD-10-CM

## 2019-08-14 DIAGNOSIS — A41.9 SEPSIS, UNSPECIFIED ORGANISM: ICD-10-CM

## 2019-08-14 DIAGNOSIS — D50.9 IRON DEFICIENCY ANEMIA, UNSPECIFIED: ICD-10-CM

## 2019-08-14 DIAGNOSIS — E43 UNSPECIFIED SEVERE PROTEIN-CALORIE MALNUTRITION: ICD-10-CM

## 2019-08-14 DIAGNOSIS — Z66 DO NOT RESUSCITATE: ICD-10-CM

## 2019-11-18 NOTE — PROGRESS NOTE ADULT - PROBLEM SELECTOR PROBLEM 7
hypertension
Iron deficiency anemia, unspecified iron deficiency anemia type
Iron deficiency anemia, unspecified iron deficiency anemia type
Encounter for palliative care
Iron deficiency anemia, unspecified iron deficiency anemia type
Iron deficiency anemia, unspecified iron deficiency anemia type

## 2020-01-28 NOTE — PROGRESS NOTE ADULT - ASSESSMENT
Uterine Mass Admission Reconciliation is Completed  Discharge Reconciliation is Not Complete Admission Reconciliation is Completed  Discharge Reconciliation is Completed

## 2020-02-27 NOTE — ED ADULT TRIAGE NOTE - PAIN RATING/NUMBER SCALE (0-10): ACTIVITY
2

## 2020-06-26 NOTE — ED ADULT TRIAGE NOTE - PAIN RATING/NUMBER SCALE (0-10): REST
Neeru calls tonight with questions about her colonoscopy tomorrow morning. Prep at 4 pm. She is passing clear water, no discoloration, no sediment. Wonders if she still needs to do the second half of the jug tomorrow morning at 0400.    Provider is paged..     Dr. Stiles calls back. He would like the patient to take the rest of the prep.   The patient is advised to do the rest of the prep.       
Regarding:  has question about colonoscopy   ----- Message from Nayana Ana sent at 6/25/2020  8:08 PM CDT -----  Patient Name: Neeru TAN Post    Specialist or PCP Full Name:Link Stiles md    Pregnant (If Yes, how long?):no    Symptoms: has question about colonoscopy   Do you or any of your household members have the following symptoms:  Fever >100.4#F or >38.0#C: No    New or worsening cough, shortness of breath, or sore throat: No    New onset of nausea, vomiting or diarrhea: No    New onset of loss of taste or smell, chills, repeated shaking with chills, muscle pain, or headache: No    Have you, a household member, or another person you have been in contact with tested positive for COVID-19 in the last 14 days?: No    Call Back #[phone]     Call Center Account #:[id number]  Please update the Demographics section with the patients permanent resident address     Emergent COVID-19 Symptoms requiring Nurse Triage:  Trouble breathing, Persistent pain or pressure in the chest, New confusion, Inability to wake or stay awake, Bluish lips or face      
2

## 2021-09-16 NOTE — CONSULT NOTE ADULT - SUBJECTIVE AND OBJECTIVE BOX
[FreeTextEntry1] : pt comes for pap . She has no complaints . 
Chief Complaint:  Patient is a 69y old  Female who presents with a chief complaint of LLE  Swelling (03 May 2019 08:57)      HPI:  Pt. is a 68 y/o female w/pmhx of HTN,CKD and fibroids comes in w/lle swelling and pain.  Pt states she has been having abnormal vaginal bleeding, had seen gyn and had in office bx that wasnt revealing and was scheduled for D/C at [facility] on friday.  Pt had noticed swelling of LLE and told anesthesia who wanted LLE US done.  Pt doesnt know of results of US.  pt states she was nervous and anesthesia then wanted to get ct head for which pt was to be admitted, but she didnt want to and left.  she states she noticed more swelling of LLE next day and today she was also getting pain and more swelling which prompted her to come to ed.  pt doesnt re;port any recent travels or trauma.  she denies any fever, chills, sob, cp, palpitations, n/v/d/c no sick contacts. Called to evaluate for IVC filter      PMH/PSH:PAST MEDICAL & SURGICAL HISTORY:  Chronic kidney disease  Essential hypertension  No significant past surgical history      Allergies:  No Known Allergies      Medications:  acetaminophen   Tablet .. 650 milliGRAM(s) Oral every 6 hours PRN  docusate sodium 100 milliGRAM(s) Oral two times a day  ferrous    sulfate 325 milliGRAM(s) Oral daily  heparin  Infusion.  Unit(s)/Hr IV Continuous <Continuous>  heparin  Injectable 4500 Unit(s) IV Push every 6 hours PRN  heparin  Injectable 2000 Unit(s) IV Push every 6 hours PRN  influenza   Vaccine 0.5 milliLiter(s) IntraMuscular once  losartan 100 milliGRAM(s) Oral daily  polyethylene glycol 3350 17 Gram(s) Oral daily PRN      REVIEW OF SYSTEMS:    All other review of systems is negative unless indicated above.    Relevant Family History:   FAMILY HISTORY:  No pertinent family history in first degree relatives      Relevant Social History:  Denies ETOH or tobacco history    Physical Exam:    Vital Signs:  Vital Signs Last 24 Hrs  T(C): 37.8 (03 May 2019 05:15), Max: 38.1 (02 May 2019 17:34)  T(F): 100.1 (03 May 2019 05:15), Max: 100.6 (02 May 2019 17:34)  HR: 80 (03 May 2019 05:15) (80 - 89)  BP: 110/64 (03 May 2019 05:15) (110/64 - 127/74)  BP(mean): --  RR: 18 (03 May 2019 05:15) (17 - 18)  SpO2: 98% (03 May 2019 05:15) (97% - 100%)  Daily     Daily Weight in k.4 (03 May 2019 05:15)    Constitutional: WDWN resting comfortably in bed; NAD  HEENT: NC/AT, PERRL, EOMI, anicteric sclera, no nasal discharge; uvula midline, no oropharyngeal erythema or exudates  Neck: supple; no JVD or thyromegaly  Respiratory: CTA B/L; no W/R/R, no retractions  Cardiac: +S1/S2; RRR; no M/R/G; PMI non-displaced  Gastrointestinal: soft, NT/ND; no rebound or guarding; +BS   Extremities: , no clubbing or cyanosis;+ Swelling to LLE  Musculoskeletal:  no joint swelling, tenderness or erythema  Vascular: 2+ radial, femoral, DP/PT pulses B/L  Skin: warm, dry and intact; no rashes, wounds, or scars  Neurologic: AAOx3; CNS grossly intact; no focal deficits no asterixis, no tremor, no encephalopathy    Laboratory:                          7.6    10.10 )-----------( 339      ( 03 May 2019 07:02 )             24.8     05-03    135  |  103  |  30<H>  ----------------------------<  96  4.8   |  24  |  1.65<H>    Ca    9.6      03 May 2019 07:02  Phos  3.5     05  Mg     2.3     05-02        PT/INR - ( 03 May 2019 07:02 )   PT: 12.6 sec;   INR: 1.12 ratio         PTT - ( 03 May 2019 07:02 )  PTT:94.5 sec        Intake and Output    19 @ 07:01  -  19 @ 07:00  --------------------------------------------------------  IN: 270 mL / OUT: 0 mL / NET: 270 mL        Imaging:  EXAM:  US PELVIC COMPLETE                            PROCEDURE DATE:  2019          INTERPRETATION:  CLINICAL INFORMATION: Uterine cancer, vaginal bleeding    COMPARISON: CT of the abdomen pelvis 2019    TECHNIQUE: Transabdominal pelvicsonogram. Color and Spectral Doppler was   performed.    FINDINGS:    There is debris versus nodularity along the right bladder wall.    The uterus is markedly enlarged and heterogeneous, difficult to   completely visualize, measuring 18.5 x 12.4 x 14.0 cm. Multiple coarse   calcifications within the uterus.    The endometrium is not completely visualized. Visualized portion measures   6 mm in thickness.    Right ovary: 3.6 x 2.1 x 2.5 cm. Within normal limits.  Left ovary: 3.4 x 2.4 x 3.1 cm. Within normal limits.    Free fluid: None.    IMPRESSION:    Markedly enlarged, heterogeneous uterus concerning for uterine neoplasm.   Endometrium is distorted and not well visualized.    There is debris versus nodularity along the right urinary bladder wall.   Consider further evaluation with contrast-enhanced CT or cystoscopy to   evaluate for metastatic disease.        MEENA GOVEA M.D., ATTENDING RADIOLOGIST  This document has been electronically signed. 2019 11:16AM            EXAM:  MR PELVIS                            PROCEDURE DATE:  2019          INTERPRETATION:  MRI of the pelvis without contrast    Clinical indications: Uterine bleeding with metastatic disease on imaging    Comparison: CT of the abdomen pelvis performed on 2019 and   ultrasound of the pelvis dated 2019    Technique: Multiplanar/multisequence imaging of the abdomen was performed   without contrast.    FINDINGS:    The study is limited by respiratory motion and lack of contrast.    The uterus is markedly enlarged and heterogeneous measuring 16.2 x 11.8 x   15.3 cm with innumerable masses and calcifications. Endometrium is not   well visualized. Ovaries are not well visualized.    Urinary bladder is underdistended. Small freefluid in the pelvis.    No evidence of bowel obstruction. Probable small right renal cyst.    There is expansion of the left external iliac and common femoral vein   with surrounding stranding compatible with known DVT.    IMPRESSION:    The study islimited due to motion and lack of intravenous contrast.    Markedly enlarged heterogeneous uterus with multiple masses which may   represent fibroids versus leiomyosarcoma.     Additional findings as above.
Reason for Consultation: Uterine Mass    HPI: Patient is a 69y Female seen on consultatioin for the evaluation and management of Uterine Mass    Pt. is a 70 y/o female w/pmhx of HTN,CKD and fibroids comes in w/lle swelling and pain.  Pt states she has been having abnormal vaginal bleeding, had seen gyn and had in office bx that wasnt revealing and was scheduled for D/C at [facility] on friday.  Pt had noticed swelling of LLE and told anesthesia who wanted LLE US done.  Pt doesnt know of results of US.  pt states she was nervous and anesthesia then wanted to get ct head for which pt was to be admitted, but she didnt want to and left.  she states she noticed more swelling of LLE next day and today she was also getting pain and more swelling which prompted her to come to ed.  pt doesnt re;port any recent travels or trauma.     On CT revealed Large Uterine Mass, Lung Mass    PAST MEDICAL & SURGICAL HISTORY:  Chronic kidney disease  Essential hypertension  No significant past surgical history      MEDICATIONS  (STANDING):  heparin  Infusion.  Unit(s)/Hr (11 mL/Hr) IV Continuous <Continuous>  influenza   Vaccine 0.5 milliLiter(s) IntraMuscular once  losartan 100 milliGRAM(s) Oral daily    MEDICATIONS  (PRN):  heparin  Injectable 4500 Unit(s) IV Push every 6 hours PRN For aPTT less than 40  heparin  Injectable 2000 Unit(s) IV Push every 6 hours PRN For aPTT between 40 - 57      Allergies    No Known Allergies    Intolerances        SOCIAL HISTORY:    Smoking Status: denies  Alcohol: denies  Marital Status: no   Occupation: retired    FAMILY HISTORY:  No pertinent family history in first degree relatives      Vital Signs Last 24 Hrs  T(C): 37.7 (2019 05:37), Max: 37.7 (2019 00:45)  T(F): 99.9 (2019 05:37), Max: 99.9 (2019 05:37)  HR: 81 (2019 05:37) (81 - 91)  BP: 125/72 (2019 05:37) (125/72 - 154/80)  BP(mean): --  RR: 17 (2019 05:37) (17 - 18)  SpO2: 100% (2019 05:37) (100% - 100%)    PHYSICAL EXAM:    general - AAO x 3  HEENT - No Icterus  CVS - RRR  RS - AE B/L  Abd - soft, NT, Abdominal Mass  Ext - Pulses +        LABS:                        8.8    14.73 )-----------( 282      ( 2019 08:44 )             28.5     04    133<L>  |  99  |  42<H>  ----------------------------<  150<H>  5.4<H>   |  25  |  2.04<H>    Ca    9.5      2019 18:03    TPro  8.1  /  Alb  3.0<L>  /  TBili  0.2  /  DBili  x   /  AST  40<H>  /  ALT  23  /  AlkPhos  120  -    PT/INR - ( 2019 18:03 )   PT: 12.8 sec;   INR: 1.14 ratio         PTT - ( 2019 02:41 )  PTT:89.0 sec  Urinalysis Basic - ( 2019 20:04 )    Color: Yellow / Appearance: Clear / S.015 / pH: x  Gluc: x / Ketone: Negative  / Bili: Negative / Urobili: Negative mg/dL   Blood: x / Protein: 30 mg/dL / Nitrite: Negative   Leuk Esterase: Negative / RBC: 0-2 /HPF / WBC 3-5   Sq Epi: x / Non Sq Epi: Occasional / Bacteria: Few          RADIOLOGY & ADDITIONAL STUDIES:

## 2021-10-18 NOTE — CONSULT NOTE ADULT - PROBLEM SELECTOR PROBLEM 5
Soak if necessary Band-Aid for protection over the next few days  
Moderate protein-calorie malnutrition

## 2024-01-01 NOTE — ED PROVIDER NOTE - NS_BEDUNITTYPES_ED_ALL_ED
"Progress Note -    Baby Girl Enriquez (Jennifer) 21 hours female MRN: [id number]  Unit/Bed#: (N) Encounter: [id number]      Assessment: Gestational Age: 39w1d female.  Breech presentation - will need outpatient hip ultrasound at 4-6 weeks; normal exam.    Plan: normal  care.  Anticipate discharge in 1-2 days  Follow up Halle Bermudez     21 hours old live  .   Stable, no events noted overnight.   Feedings (last 2 days)       Date/Time Feeding Type Feeding Route    24 0900 Non-human milk substitute Bottle          Output: Unmeasured Urine Occurrence: 1  Unmeasured Stool Occurrence: 1    Objective   Vitals:   Temperature: 98.5 °F (36.9 °C)  Pulse: 144  Respirations: 48  Height: 21.5\" (54.6 cm) (Filed from Delivery Summary)  Weight: 3581 g (7 lb 14.3 oz)   Pct Wt Change: -2.43 %    Physical Exam:   General Appearance:  Alert, active, no distress  Head:  Normocephalic, AFOF                             Eyes:  Conjunctiva clear, +RR  Ears:  Normally placed, no anomalies  Nose: nares patent                           Mouth:  Palate intact  Respiratory:  No grunting, flaring, retractions, breath sounds clear and equal    Cardiovascular:  Regular rate and rhythm. No murmur. Adequate perfusion/capillary refill. Femoral pulse present  Abdomen:   Soft, non-distended, no masses, bowel sounds present, no HSM  Genitourinary:  Normal female, patent vagina, anus patent  Spine:  No hair bakari, dimples  Musculoskeletal:  Normal hips, clavicles intact  Skin/Hair/Nails:   Skin warm, dry, and intact, no rashes               Neurologic:   Normal tone and reflexes        "
MEDICINE

## 2024-03-13 NOTE — ED ADULT TRIAGE NOTE - BP NONINVASIVE SYSTOLIC (MM HG)
History     Chief Complaint   Patient presents with    Abdominal Pain     HPI  Dennis J Goodell is a 47 year old male who has a history of Crohn's disease and is here complaining of abdominal pain.  He says ever since last fall he feels he has been having more pain and more flares with his Crohn's disease.  Normally he is able to deal with these and he does not come into the emergency department very often.  Yesterday he started having more pain and diarrhea.  About 10:00 last night the pain got quite severe, he said he did not sleep well.  He is here now complaining of a 10 out of 10 pain in the right upper quadrant.  Denies fevers or chills.  He has been drinking liquids and is urinating normally.  Diarrhea yesterday which is stopped, he feels like he has to have a bowel movement but has not been able to go, he says he feels constipated.    Allergies:  No Known Allergies    Problem List:    Patient Active Problem List    Diagnosis Date Noted    MRSA (methicillin resistant staph aureus) culture positive 01/31/2023     Priority: Medium    Crohn's disease of large intestine without complication (H) 12/23/2022     Priority: Medium    Colitis 11/18/2022     Priority: Medium    Leukocytosis 11/18/2022     Priority: Medium    Facet arthropathy, cervical 04/28/2021     Priority: Medium    Intractable chronic migraine without aura and with status migrainosus 04/28/2021     Priority: Medium    MARLEN (generalized anxiety disorder) 06/09/2020     Priority: Medium    Dysthymic disorder 02/09/2018     Priority: Medium    Raynaud phenomenon 02/09/2018     Priority: Medium    History of testicular cancer 08/12/2016     Priority: Medium     Mets to left neck  Diagnosed in [place]  In remission      Mixed emotional features as adjustment reaction 09/08/2009     Priority: Medium     Formatting of this note might be different from the original.  IMO Update 10/11      Germ cell tumor (H) 04/16/2008     Priority: Medium        Past  Medical History:    Past Medical History:   Diagnosis Date    Anxiety disorder 2012    Dysthymic disorder     Malignant neoplasm of testis (H) 2005    Raynaud's syndrome without gangrene     Uncomplicated alcohol abuse        Past Surgical History:    Past Surgical History:   Procedure Laterality Date    COLONOSCOPY N/A 12/12/2022    Procedure: COLONOSCOPY, WITH POLYPECTOMY AND BIOPSY;  Surgeon: Solomon Arciniega MD;  Location: [facility] OR    COLONOSCOPY N/A 8/10/2023    Procedure: COLONOSCOPY, WITH POLYPECTOMY AND BIOPSY;  Surgeon: Helder Sanchez MD;  Location: [facility] OR    DECOMPRESSION CUBITAL TUNNEL Left 01/2019    ESOPHAGOSCOPY, GASTROSCOPY, DUODENOSCOPY (EGD), COMBINED N/A 8/10/2023    Procedure: ESOPHAGOGASTRODUODENOSCOPY, WITH BIOPSY;  Surgeon: Helder Sanchez MD;  Location: [facility] OR    HERNIA REPAIR      ,HERNIA REPAIR    IR CHEST PORT PLACEMENT > 5 YRS OF AGE Left 04/01/2008    LYMPH NODE BIOPSY  09/05/2008     [facility]. 37 lymph nodes excised    ORCHIECTOMY SCROTAL Right 12/30/2005    teratoma    OTHER SURGICAL HISTORY      9/05/08,666012,OTHER    RELEASE CARPAL TUNNEL  04/09/2013       Family History:    Family History   Problem Relation Age of Onset    Pulmonary Embolism Mother     Other - See Comments Daughter         recurrent OM    Other - See Comments Other         Suicidality,maternal uncle    No Known Problems Father        Social History:  Marital Status:   [2]  Social History     Tobacco Use    Smoking status: Never    Smokeless tobacco: Current     Types: Chew    Tobacco comments:     1 tin lasts 1 week   Vaping Use    Vaping Use: Never used   Substance Use Topics    Alcohol use: Not Currently     Alcohol/week: 6.0 standard drinks of alcohol     Types: 6 Cans of beer per week    Drug use: Never        Medications:    amitriptyline (ELAVIL) 10 MG tablet  dicyclomine (BENTYL) 10 MG capsule  diphenoxylate-atropine (LOMOTIL) 2.5-0.025 MG tablet  NIFEdipine ER  OSMOTIC (PROCARDIA XL) 90 MG 24 hr tablet  omeprazole (PRILOSEC) 40 MG DR capsule  polyethylene glycol (MIRALAX) 17 GM/Dose powder  Probiotic Product (FLORAJEN DIGESTION) CAPS  sucralfate (CARAFATE) 1 GM tablet  albuterol (PROAIR HFA/PROVENTIL HFA/VENTOLIN HFA) 108 (90 Base) MCG/ACT inhaler  ALPRAZolam (XANAX) 1 MG tablet  Aspirin-Acetaminophen-Caffeine (EXCEDRIN MIGRAINE PO)  bismuth subsalicylate (PEPTO BISMOL) 262 MG/15ML suspension  calcium carbonate (TUMS) 500 MG chewable tablet  clonazePAM (KLONOPIN) 1 MG tablet  desvenlafaxine succinate (PRISTIQ) 100 MG 24 hr tablet  inFLIXimab  Lactobacillus (FLORAJEN ACIDOPHILUS) CAPS  Probiotic Product (FLORAJEN3) CAPS  psyllium (METAMUCIL/KONSYL) 58.6 % powder          Review of Systems   Constitutional:  Negative for chills and fever.   HENT:  Negative for congestion.    Eyes:  Negative for visual disturbance.   Respiratory:  Negative for chest tightness and shortness of breath.    Cardiovascular:  Negative for chest pain.   Gastrointestinal:  Positive for abdominal pain. Negative for vomiting.   Genitourinary:  Negative for dysuria.   Musculoskeletal:  Negative for arthralgias.   Skin:  Negative for rash.   Neurological:  Negative for light-headedness.   Psychiatric/Behavioral:  Negative for agitation.        Physical Exam   BP: 107/75  Pulse: 100  Temp: 97.2  F (36.2  C)  Resp: 18      Physical Exam  Vitals and nursing note reviewed.   Constitutional:       Appearance: He is well-developed.   HENT:      Head: Normocephalic and atraumatic.      Mouth/Throat:      Mouth: Mucous membranes are moist.   Eyes:      Conjunctiva/sclera: Conjunctivae normal.   Cardiovascular:      Rate and Rhythm: Normal rate and regular rhythm.      Heart sounds: Normal heart sounds.   Pulmonary:      Effort: Pulmonary effort is normal.      Breath sounds: Normal breath sounds.   Abdominal:      General: Abdomen is flat. Bowel sounds are normal.      Palpations: Abdomen is soft.      Comments:  Tender mid to right upper abdomen.  No rebound.  No masses or organomegaly   Skin:     General: Skin is warm and dry.   Neurological:      Mental Status: He is alert and oriented to person, place, and time.   Psychiatric:         Mood and Affect: Mood normal.         Behavior: Behavior normal.           ED Course as of 03/13/24 0637   Wed Mar 13, 2024   0635 Patient with history of Crohn's disease, here with diarrhea and increasing abdominal pain.  Will check some labs, get CT scan of abdomen and pelvis.  Will give him a dose of Dilaudid for pain.  It is currently change of shift and care of patient will be turned over to dayshift at this time.     Procedures                Results for orders placed or performed during the hospital encounter of 03/13/24 (from the past 24 hour(s))   [facility]    Narrative    The following orders were created for panel order [facility].  Procedure                               Abnormality         Status                     ---------                               -----------         ------                     Extra Blue Top Tube[[id number]]                              In process                 Extra Red Top Tube[[id number]]                               In process                 Extra Green Top (Lithium...[[id number]]                      In process                 Extra Purple Top Tube[[id number]]                            In process                 Extra Green Top (Lithium...[[id number]]                      In process                   Please view results for these tests on the individual orders.   CBC with platelets differential    Narrative    The following orders were created for panel order CBC with platelets differential.  Procedure                               Abnormality         Status                     ---------                               -----------         ------                     CBC with platelets and d...[[id number]]                      In process                    Please view results for these tests on the individual orders.       Medications   sodium chloride 0.9 % infusion (has no administration in time range)   HYDROmorphone (PF) (DILAUDID) injection 0.5 mg (has no administration in time range)   iohexol (OMNIPAQUE) 9 MG/ML oral solution 1,000 mL (has no administration in time range)       Assessments & Plan (with Medical Decision Making)     I have reviewed the nursing notes.    I have reviewed the findings, diagnosis, plan and need for follow up with the patient.        New Prescriptions    No medications on file       Final diagnoses:   Right upper quadrant abdominal pain       3/13/2024   [facility] AND \[facility] [place] [facility]\""       Lemuel Dietz MD  03/13/24 0637     137

## 2024-05-16 NOTE — DISCHARGE NOTE NURSING/CASE MANAGEMENT/SOCIAL WORK - NSDCPEFALRISK_GEN_ALL_CORE
Health Maintenance Due   Topic     RSV Vaccine (Age 60+ and Pregnant patients) (1 - 1-dose 60+ series) Not offered at this facility.     COVID-19 Vaccine (6 - 2023-24 season) Not offered at this facility.     Eye Exam      Foot Exam      Mammogram           Patient information on fall and injury prevention

## 2024-08-01 NOTE — DISCHARGE NOTE NURSING/CASE MANAGEMENT/SOCIAL WORK - NSTRANSFERBELONGINGSRESP_GEN_A_NUR
Cell phone: [phone]     Dr. Margaret Godfrey    Today you were seen for:  No diagnosis found.    You are recommended to:  Return in about 4 days (around 8/5/2024).      Top 10 tips to keep the eyes healthy and comfortable:      1. Wear sunglasses with 100% UV protection and blue blocking lenses to lessen the risk or progression of cataracts, macular degeneration, and eyelid skin cancers.  2. Consider a clear 100% UV coating and an antireflective coating on prescription eyeglasses.  3. Consider impact-resistant Trivex or polycarbonate lenses for prescription eyeglasses for safety.  4. Position computer screens greater than 24 inches away and at a pitch of 20-30° below eye level. Bright room and screen lighting improves focusing ability by constricting the pupils of the eyes.  5. Avoid \"get the red out\" eyedrops (Visine, Murine, Clear Eyes, etc). Instead use an artificial tear drop (Systane Contacts Lubricant Eye Drops, Refresh Contacts Drops, or Refresh Relieva for Contacts) for dryness and irritation or an antihistamine drop (Pataday 0.2% or ketotifen fumarate 0.035% (Alaway or Zaditor)) for allergy symptoms.  6. Avoid smoking - smoking increases the risk of cataracts and macular degeneration.  7. Avoid eye rubbing - rubbing increases the risk of keratoconus (a corneal degeneration).  8. Take a multivitamin and 6 mg of lutein daily (lutein is found in kale and spinach and helps strengthen the pigment layer of the retina protecting against macular degeneration and may also help with light sensitivity).  9. Proper diet and exercise to lower weight, blood pressure, cholesterol, and blood sugar which lessens the risk of retinal disorders.   10. Have a dilated eye examination as directed by our office. The frequency is based on current eye health, age, family history, and risk factors. Many eye and general health problems are symptomless and are uncovered by eye exams. Dilation is an extremely important part of the  exam. Without dilation, detection of eye problems (retinal bleeds/strokes, tears, detachments, degenerations, and melanoma) and signs of an underlying health problem (diabetes, hypertension, autoimmune disease, leukemia, lymphoma, blockage of a carotid or cardiac artery, toxoplasmosis, histoplasmosis, and many others) is impossible. If these conditions are undetected and untreated, some can lead to blindness or even death. In our office, dilation is mandatory to provide the best care possible.    New in optometry:  - Nanodropper is a compatible device that you can add to most prescription eye drops to reduce the size of an eye drop to just what the eye can absorb. Smaller size eye drops can triple the number of drops and reduce the cost of drops in the long run! www.nanodropper.com  - Research has been showing that red light therapy for kids may reduce the risk of nearsightedness. This would be a great potential option in the future for noninvasive myopia therapy.  - Some companies have created a hand-held visual field machine. Unfortunately we do not carry this due to the cost. Apparently it is reliable and provides accurate results, however, most ophthalmologists still prefer the visual field machine that we have in office due to scientific data and research. You can look it up online to see what it looks like - just Google search [employer].  - Do not swim with your contact lenses in as that increases your change of Pseudomonas which is a very harsh bacterial eye infection that can cause permanent damage quickly.   - Tyrvaya is a new nasal spray you may see on TV commercials which reports improvement in dry eye conditions. Since this is so new on the market, it is also very expensive and most insurance companies do not cover the cost at this time.   - Syfovre is a new injectable medication that some age related macular degeneration patients can get to reduce the progression by about 36%. However, only a  certain stage of the disease can qualify for this medication.   - Lutein & Zeaxanthin have recently been found to be a great addition to children's ocular health.          Thank you for choosing and trusting our office with your health.      [facility]    Hours: Monday-Friday 8:30 AM - 5 PM    Telephone number: [phone]  option #3      [facility]    Hours: Monday 8:30 AM - 6 PM     Tuesday-Thursday 8:30 AM - 5 PM     Friday 8:30 AM - 1PM    Telephone number: [phone]      NOTE: If you would like to come back another day to browse glasses or order contact lenses we do ask that you schedule an appointment. However, you are welcome to order glasses or contact lenses after your appointment today!        Dr. Margaret Godfrey  Phone: [phone]  Fax: [phone]  [facility] [place] [address]    If you use the [employer] application to message Dr. Godfrey you will reach her optometric technicians. They will try to address your problem, concern and/or question.       yes

## 2024-08-02 NOTE — H&P ADULT - PEDAL EDEMA SEVERITY
ASSESSMENT      Are you having any side effects from your treatment?  -no     Are you eating and drinking properly?  - yes     Are you having any pain?  - no    Have needs changed for a palliative care referral?  -pt already established       Do you know when your upcoming appointments are?  -yes     Do you have a good support system?  -yes     Are you interested in any support groups?  - pt declined     Are there any changes in barriers to care?  -  No     Do you have any questions or concerns regarding your treatment plan?  - Not at this time. Papo was very appreciative of my call.   
4+

## 2024-09-01 NOTE — PROGRESS NOTE ADULT - ASSESSMENT
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     Discussed with son at bedside today. DNR/DNI was discussed with son.  Home hospice is being considered, but for now will like home PT and discuss further treatment options with     her oncologist. Performance status is poor. Render In Strict Bullet Format?: No Continue Regimen: Lit[place]o

## 2024-10-15 NOTE — ED PROVIDER NOTE - NS ED MD DISPO ISOLATION TYPES
----- Message from Renetta Ross sent at 10/15/2024  3:39 PM EDT -----  Stress and echo are ok, ensure she keep her scheduled follow up to discuss further    None

## 2025-03-11 NOTE — PROGRESS NOTE ADULT - ASSESSMENT
[facility]  [facility]  [address] [place] [address] [address]  [address]  [facility] [address]  Dept: [phone]  Dept Fax: [phone]    Katie Valle is a 41 y.o. female who presentstoday for her medical conditions/complaints as noted below.  Katie Valle is c/o of  Chief Complaint   Patient presents with    Annual Exam         HPI:     History of Present Illness  The patient presents for a physical exam, amenorrhea, keloid scar, weight management.    She reports that her last menstrual period was on 01/17/2025, with subsequent pregnancy tests yielding negative results. She typically experiences a menstrual cycle every 28 days. She does not experience any atypical pelvic pain but notes an increase in clear vaginal discharge, which she usually associates with ovulation but it has remained persistent. She has no history of fibroids or uterine polyps. She recalls experiencing cramping in 02/2025, accompanied by light spotting, which she attributes to stress. She continues to experience cramping this month. She has no known history of PCOS. Her last Pap smear was conducted in 2020 during her pregnancy, and she was advised to have another one this year. She is not currently using any form of birth control and has not used any recently. She reports no symptoms suggestive of a urinary tract infection, including burning with urination, fever, or chills.    She reports a lesion on her leg, which initially appeared as a bug bite with a white head and significant irritation. The lesion subsequently evolved into a mole-like appearance. She is uncertain about the need for dermatological evaluation.    She has been experiencing difficulty in losing weight since the birth of her daughter. Despite no changes in her lifestyle, she gained weight postpartum, which has since stabilized. Over the past 6 months, she has only managed to lose 1 pound.    Dysthymia is stable on wellbutrin. 
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  Vital signs upon ED presentation as follows: BP = 148/90, HR = 109, TT = 18, T = 37 C (98.7 F), O2 Sat = 100% on RA.  Diagnosed with DVT.
